# Patient Record
Sex: MALE | Race: WHITE | NOT HISPANIC OR LATINO | Employment: OTHER | URBAN - METROPOLITAN AREA
[De-identification: names, ages, dates, MRNs, and addresses within clinical notes are randomized per-mention and may not be internally consistent; named-entity substitution may affect disease eponyms.]

---

## 2019-07-05 ENCOUNTER — HOSPITAL ENCOUNTER (INPATIENT)
Facility: HOSPITAL | Age: 59
LOS: 2 days | Discharge: HOME | DRG: 189 | End: 2019-07-07
Attending: EMERGENCY MEDICINE | Admitting: HOSPITALIST
Payer: MEDICAID

## 2019-07-05 DIAGNOSIS — J44.1 COPD EXACERBATION: ICD-10-CM

## 2019-07-05 DIAGNOSIS — R07.9 CHEST PAIN: ICD-10-CM

## 2019-07-05 DIAGNOSIS — R56.9 SEIZURE: ICD-10-CM

## 2019-07-05 DIAGNOSIS — E87.29 RESPIRATORY ACIDOSIS: ICD-10-CM

## 2019-07-05 DIAGNOSIS — R06.02 SOB (SHORTNESS OF BREATH): ICD-10-CM

## 2019-07-05 DIAGNOSIS — J06.9 ACUTE RESPIRATORY DISEASE: Primary | ICD-10-CM

## 2019-07-05 PROBLEM — J96.02 ACUTE HYPERCAPNIC RESPIRATORY FAILURE: Status: ACTIVE | Noted: 2019-07-05

## 2019-07-05 PROBLEM — J44.9 COPD (CHRONIC OBSTRUCTIVE PULMONARY DISEASE): Status: ACTIVE | Noted: 2019-07-05

## 2019-07-05 PROBLEM — Z72.0 TOBACCO ABUSE: Status: ACTIVE | Noted: 2019-07-05

## 2019-07-05 PROBLEM — J45.909 ASTHMA: Status: ACTIVE | Noted: 2019-07-05

## 2019-07-05 PROBLEM — G40.909 SEIZURE DISORDER: Status: ACTIVE | Noted: 2019-07-05

## 2019-07-05 LAB
ALBUMIN SERPL BCP-MCNC: 4.4 G/DL (ref 3.5–5.2)
ALLENS TEST: ABNORMAL
ALP SERPL-CCNC: 107 U/L (ref 55–135)
ALT SERPL W/O P-5'-P-CCNC: 8 U/L (ref 10–44)
ANION GAP SERPL CALC-SCNC: 8 MMOL/L (ref 8–16)
AST SERPL-CCNC: 21 U/L (ref 10–40)
BASOPHILS # BLD AUTO: 0.05 K/UL (ref 0–0.2)
BASOPHILS NFR BLD: 0.4 % (ref 0–1.9)
BILIRUB SERPL-MCNC: 0.4 MG/DL (ref 0.1–1)
BNP SERPL-MCNC: 603 PG/ML (ref 0–99)
BUN SERPL-MCNC: 13 MG/DL (ref 6–20)
CALCIUM SERPL-MCNC: 9.8 MG/DL (ref 8.7–10.5)
CHLORIDE SERPL-SCNC: 104 MMOL/L (ref 95–110)
CO2 SERPL-SCNC: 33 MMOL/L (ref 23–29)
CREAT SERPL-MCNC: 0.8 MG/DL (ref 0.5–1.4)
DELSYS: ABNORMAL
DIFFERENTIAL METHOD: ABNORMAL
EOSINOPHIL # BLD AUTO: 1.2 K/UL (ref 0–0.5)
EOSINOPHIL NFR BLD: 9.2 % (ref 0–8)
EP: 5
ERYTHROCYTE [DISTWIDTH] IN BLOOD BY AUTOMATED COUNT: 14.5 % (ref 11.5–14.5)
ERYTHROCYTE [SEDIMENTATION RATE] IN BLOOD BY WESTERGREN METHOD: 16 MM/H
EST. GFR  (AFRICAN AMERICAN): >60 ML/MIN/1.73 M^2
EST. GFR  (NON AFRICAN AMERICAN): >60 ML/MIN/1.73 M^2
FIO2: 21
GLUCOSE SERPL-MCNC: 111 MG/DL (ref 70–110)
HCO3 UR-SCNC: 32.7 MMOL/L (ref 24–28)
HCT VFR BLD AUTO: 45 % (ref 40–54)
HGB BLD-MCNC: 13.8 G/DL (ref 14–18)
IP: 10
LYMPHOCYTES # BLD AUTO: 2.1 K/UL (ref 1–4.8)
LYMPHOCYTES NFR BLD: 16.6 % (ref 18–48)
MCH RBC QN AUTO: 30.1 PG (ref 27–31)
MCHC RBC AUTO-ENTMCNC: 30.7 G/DL (ref 32–36)
MCV RBC AUTO: 98 FL (ref 82–98)
MODE: ABNORMAL
MONOCYTES # BLD AUTO: 1 K/UL (ref 0.3–1)
MONOCYTES NFR BLD: 8.1 % (ref 4–15)
NEUTROPHILS # BLD AUTO: 8.4 K/UL (ref 1.8–7.7)
NEUTROPHILS NFR BLD: 66 % (ref 38–73)
PCO2 BLDA: 71.6 MMHG (ref 35–45)
PH SMN: 7.27 [PH] (ref 7.35–7.45)
PLATELET # BLD AUTO: 234 K/UL (ref 150–350)
PMV BLD AUTO: 10.6 FL (ref 9.2–12.9)
PO2 BLDA: 76 MMHG (ref 80–100)
POC BE: 4 MMOL/L
POC SATURATED O2: 92 % (ref 95–100)
POC TCO2: 35 MMOL/L (ref 23–27)
POTASSIUM SERPL-SCNC: 4.4 MMOL/L (ref 3.5–5.1)
PROT SERPL-MCNC: 8.6 G/DL (ref 6–8.4)
RBC # BLD AUTO: 4.59 M/UL (ref 4.6–6.2)
SAMPLE: ABNORMAL
SITE: ABNORMAL
SODIUM SERPL-SCNC: 145 MMOL/L (ref 136–145)
TROPONIN I SERPL DL<=0.01 NG/ML-MCNC: 0.01 NG/ML (ref 0–0.03)
TROPONIN I SERPL DL<=0.01 NG/ML-MCNC: 0.01 NG/ML (ref 0–0.03)
WBC # BLD AUTO: 12.81 K/UL (ref 3.9–12.7)

## 2019-07-05 PROCEDURE — 27000190 HC CPAP FULL FACE MASK W/VALVE

## 2019-07-05 PROCEDURE — 84484 ASSAY OF TROPONIN QUANT: CPT | Mod: 91

## 2019-07-05 PROCEDURE — 85025 COMPLETE CBC W/AUTO DIFF WBC: CPT

## 2019-07-05 PROCEDURE — 20000000 HC ICU ROOM

## 2019-07-05 PROCEDURE — 94644 CONT INHLJ TX 1ST HOUR: CPT

## 2019-07-05 PROCEDURE — 63600175 PHARM REV CODE 636 W HCPCS: Performed by: EMERGENCY MEDICINE

## 2019-07-05 PROCEDURE — 36600 WITHDRAWAL OF ARTERIAL BLOOD: CPT

## 2019-07-05 PROCEDURE — 96365 THER/PROPH/DIAG IV INF INIT: CPT

## 2019-07-05 PROCEDURE — 25000242 PHARM REV CODE 250 ALT 637 W/ HCPCS: Performed by: EMERGENCY MEDICINE

## 2019-07-05 PROCEDURE — 94660 CPAP INITIATION&MGMT: CPT

## 2019-07-05 PROCEDURE — 94640 AIRWAY INHALATION TREATMENT: CPT

## 2019-07-05 PROCEDURE — 94761 N-INVAS EAR/PLS OXIMETRY MLT: CPT

## 2019-07-05 PROCEDURE — 36415 COLL VENOUS BLD VENIPUNCTURE: CPT

## 2019-07-05 PROCEDURE — 82803 BLOOD GASES ANY COMBINATION: CPT

## 2019-07-05 PROCEDURE — 96375 TX/PRO/DX INJ NEW DRUG ADDON: CPT

## 2019-07-05 PROCEDURE — 84484 ASSAY OF TROPONIN QUANT: CPT

## 2019-07-05 PROCEDURE — 83880 ASSAY OF NATRIURETIC PEPTIDE: CPT

## 2019-07-05 PROCEDURE — 80053 COMPREHEN METABOLIC PANEL: CPT

## 2019-07-05 PROCEDURE — 27000221 HC OXYGEN, UP TO 24 HOURS

## 2019-07-05 PROCEDURE — 99900035 HC TECH TIME PER 15 MIN (STAT)

## 2019-07-05 PROCEDURE — 99291 CRITICAL CARE FIRST HOUR: CPT | Mod: 25

## 2019-07-05 RX ORDER — LEVETIRACETAM 1000 MG/1
1000 TABLET ORAL 2 TIMES DAILY
Status: ON HOLD | COMMUNITY
End: 2019-07-07 | Stop reason: SDUPTHER

## 2019-07-05 RX ORDER — IPRATROPIUM BROMIDE 0.5 MG/2.5ML
0.5 SOLUTION RESPIRATORY (INHALATION) ONCE
Status: COMPLETED | OUTPATIENT
Start: 2019-07-05 | End: 2019-07-05

## 2019-07-05 RX ORDER — MULTIVITAMIN
1 TABLET ORAL DAILY
Status: ON HOLD | COMMUNITY
End: 2019-07-07 | Stop reason: SDUPTHER

## 2019-07-05 RX ORDER — MAGNESIUM 200 MG
TABLET ORAL ONCE
Status: ON HOLD | COMMUNITY
End: 2019-07-07 | Stop reason: HOSPADM

## 2019-07-05 RX ORDER — FAMOTIDINE 20 MG/1
20 TABLET, FILM COATED ORAL 2 TIMES DAILY
Status: ON HOLD | COMMUNITY
End: 2019-10-15 | Stop reason: HOSPADM

## 2019-07-05 RX ORDER — IBUPROFEN 200 MG
1 TABLET ORAL DAILY
Status: DISCONTINUED | OUTPATIENT
Start: 2019-07-06 | End: 2019-07-07 | Stop reason: HOSPADM

## 2019-07-05 RX ORDER — ALBUTEROL SULFATE 1.25 MG/3ML
1.25 SOLUTION RESPIRATORY (INHALATION) EVERY 6 HOURS PRN
Status: ON HOLD | COMMUNITY
End: 2019-07-07 | Stop reason: SDUPTHER

## 2019-07-05 RX ORDER — SPIRONOLACTONE 25 MG/1
25 TABLET ORAL DAILY
Status: ON HOLD | COMMUNITY
End: 2019-07-07 | Stop reason: SDUPTHER

## 2019-07-05 RX ORDER — NAPROXEN SODIUM 220 MG
220 TABLET ORAL
Status: ON HOLD | COMMUNITY
End: 2019-07-07 | Stop reason: HOSPADM

## 2019-07-05 RX ORDER — SODIUM CHLORIDE 0.9 % (FLUSH) 0.9 %
10 SYRINGE (ML) INJECTION
Status: DISCONTINUED | OUTPATIENT
Start: 2019-07-05 | End: 2019-07-07 | Stop reason: HOSPADM

## 2019-07-05 RX ORDER — DIGOXIN 125 MCG
125 TABLET ORAL DAILY
Status: DISCONTINUED | OUTPATIENT
Start: 2019-07-06 | End: 2019-07-07 | Stop reason: HOSPADM

## 2019-07-05 RX ORDER — ALBUTEROL SULFATE 2.5 MG/.5ML
7.5 SOLUTION RESPIRATORY (INHALATION) ONCE
Status: COMPLETED | OUTPATIENT
Start: 2019-07-05 | End: 2019-07-05

## 2019-07-05 RX ORDER — LEVETIRACETAM 10 MG/ML
1000 INJECTION INTRAVASCULAR ONCE
Status: COMPLETED | OUTPATIENT
Start: 2019-07-05 | End: 2019-07-05

## 2019-07-05 RX ORDER — BENZONATATE 100 MG/1
200 CAPSULE ORAL 3 TIMES DAILY
Status: DISCONTINUED | OUTPATIENT
Start: 2019-07-06 | End: 2019-07-07 | Stop reason: HOSPADM

## 2019-07-05 RX ORDER — IPRATROPIUM BROMIDE AND ALBUTEROL SULFATE 2.5; .5 MG/3ML; MG/3ML
3 SOLUTION RESPIRATORY (INHALATION) EVERY 4 HOURS
Status: DISCONTINUED | OUTPATIENT
Start: 2019-07-05 | End: 2019-07-06

## 2019-07-05 RX ORDER — DIGOXIN 125 MCG
125 TABLET ORAL DAILY
Status: ON HOLD | COMMUNITY
End: 2019-07-07 | Stop reason: SDUPTHER

## 2019-07-05 RX ORDER — METHYLPREDNISOLONE SOD SUCC 125 MG
125 VIAL (EA) INJECTION EVERY 8 HOURS
Status: DISCONTINUED | OUTPATIENT
Start: 2019-07-06 | End: 2019-07-06

## 2019-07-05 RX ORDER — MORPHINE SULFATE 10 MG/ML
4 INJECTION INTRAMUSCULAR; INTRAVENOUS; SUBCUTANEOUS
Status: COMPLETED | OUTPATIENT
Start: 2019-07-05 | End: 2019-07-05

## 2019-07-05 RX ORDER — FAMOTIDINE 20 MG/1
20 TABLET, FILM COATED ORAL 2 TIMES DAILY
Status: DISCONTINUED | OUTPATIENT
Start: 2019-07-06 | End: 2019-07-07 | Stop reason: HOSPADM

## 2019-07-05 RX ORDER — LEVETIRACETAM 500 MG/1
1000 TABLET ORAL 2 TIMES DAILY
Status: DISCONTINUED | OUTPATIENT
Start: 2019-07-06 | End: 2019-07-07 | Stop reason: HOSPADM

## 2019-07-05 RX ORDER — METHYLPREDNISOLONE SOD SUCC 125 MG
125 VIAL (EA) INJECTION
Status: COMPLETED | OUTPATIENT
Start: 2019-07-05 | End: 2019-07-05

## 2019-07-05 RX ORDER — SPIRONOLACTONE 25 MG/1
25 TABLET ORAL DAILY
Status: DISCONTINUED | OUTPATIENT
Start: 2019-07-06 | End: 2019-07-07 | Stop reason: HOSPADM

## 2019-07-05 RX ORDER — GUAIFENESIN/DEXTROMETHORPHAN 100-10MG/5
10 SYRUP ORAL EVERY 6 HOURS
Status: DISCONTINUED | OUTPATIENT
Start: 2019-07-06 | End: 2019-07-07 | Stop reason: HOSPADM

## 2019-07-05 RX ADMIN — IPRATROPIUM BROMIDE 0.5 MG: 0.5 SOLUTION RESPIRATORY (INHALATION) at 04:07

## 2019-07-05 RX ADMIN — LEVETIRACETAM INJECTION 1000 MG: 10 INJECTION INTRAVENOUS at 04:07

## 2019-07-05 RX ADMIN — ALBUTEROL SULFATE 7.5 MG: 2.5 SOLUTION RESPIRATORY (INHALATION) at 04:07

## 2019-07-05 RX ADMIN — METHYLPREDNISOLONE SODIUM SUCCINATE 125 MG: 125 INJECTION, POWDER, FOR SOLUTION INTRAMUSCULAR; INTRAVENOUS at 04:07

## 2019-07-05 RX ADMIN — IPRATROPIUM BROMIDE AND ALBUTEROL SULFATE 3 ML: .5; 3 SOLUTION RESPIRATORY (INHALATION) at 08:07

## 2019-07-05 RX ADMIN — MORPHINE SULFATE 4 MG: 10 INJECTION INTRAVENOUS at 05:07

## 2019-07-05 RX ADMIN — IPRATROPIUM BROMIDE AND ALBUTEROL SULFATE 3 ML: .5; 3 SOLUTION RESPIRATORY (INHALATION) at 11:07

## 2019-07-05 NOTE — ED PROVIDER NOTES
Encounter Date: 7/5/2019    SCRIBE #1 NOTE: ISkylar, am scribing for, and in the presence of, Dr. Albarran. Other sections scribed: HPI ROS PE.       History     Chief Complaint   Patient presents with    Shortness of Breath     per EMS pt reported trouble breathing, and was tachypnic. pt was given albuterol tx per EMS     This is a 59 y.o. Male with a pmhx of COPD and seizures who presents to the ED for evaluation of SOB. EMS reported he had trouble breathing, and was tachypnic. Pt was given 5 mg of albuterol tx by EMS. Patient reports taking Keppra earlier today. He reports of cp only when he coughs. He reports that he smokes daily but denies a fever or cough. No prior tx, no alleviating factors. Patient has an allergy to Keflex.         Review of patient's allergies indicates:   Allergen Reactions    Keflex [cephalexin]      Past Medical History:   Diagnosis Date    COPD (chronic obstructive pulmonary disease)      History reviewed. No pertinent surgical history.  History reviewed. No pertinent family history.  Social History     Tobacco Use    Smoking status: Current Every Day Smoker     Packs/day: 1.00   Substance Use Topics    Alcohol use: Yes     Comment: some day drinker    Drug use: Not on file     Review of Systems   Constitutional: Negative for fever.   HENT: Negative for sore throat.    Respiratory: Positive for cough and shortness of breath.    Cardiovascular: Positive for chest pain.   Gastrointestinal: Negative for nausea.   Genitourinary: Negative for dysuria.   Musculoskeletal: Negative for back pain.   Skin: Negative for rash.   Neurological: Negative for weakness.   Hematological: Does not bruise/bleed easily.       Physical Exam     Initial Vitals   BP Pulse Resp Temp SpO2   07/05/19 1617 07/05/19 1610 07/05/19 1610 -- 07/05/19 1610   (!) 146/82 (!) 120 (!) 36  100 %      MAP       --                Physical Exam    Nursing note and vitals reviewed.  Constitutional: He  appears well-developed and well-nourished. He is not diaphoretic. No distress.   HENT:   Head: Normocephalic and atraumatic.   Eyes: Conjunctivae and EOM are normal. Pupils are equal, round, and reactive to light. No scleral icterus.   Neck: Normal range of motion. Neck supple.   Cardiovascular: Regular rhythm, normal heart sounds and intact distal pulses. Tachycardia present.  Exam reveals no gallop and no friction rub.    No murmur heard.  Pulmonary/Chest: No stridor. Tachypnea noted. He is in respiratory distress. He has wheezes (Bilateral wheezing). He has no rhonchi. He has no rales.   Abdominal: Soft. Bowel sounds are normal. He exhibits no distension. There is no tenderness.   Musculoskeletal: Normal range of motion. He exhibits no edema or tenderness.   Neurological: He is alert and oriented to person, place, and time. He has normal strength. No cranial nerve deficit.   Skin: Skin is warm and dry. No rash noted.   Psychiatric: He has a normal mood and affect. His behavior is normal.         ED Course   Procedures  Labs Reviewed   CBC W/ AUTO DIFFERENTIAL - Abnormal; Notable for the following components:       Result Value    WBC 12.81 (*)     RBC 4.59 (*)     Hemoglobin 13.8 (*)     Mean Corpuscular Hemoglobin Conc 30.7 (*)     Gran # (ANC) 8.4 (*)     Eos # 1.2 (*)     Lymph% 16.6 (*)     Eosinophil% 9.2 (*)     All other components within normal limits   COMPREHENSIVE METABOLIC PANEL - Abnormal; Notable for the following components:    CO2 33 (*)     Glucose 111 (*)     Total Protein 8.6 (*)     ALT 8 (*)     All other components within normal limits   B-TYPE NATRIURETIC PEPTIDE - Abnormal; Notable for the following components:     (*)     All other components within normal limits   ISTAT PROCEDURE - Abnormal; Notable for the following components:    POC PH 7.267 (*)     POC PCO2 71.6 (*)     POC PO2 76 (*)     POC HCO3 32.7 (*)     POC SATURATED O2 92 (*)     POC TCO2 35 (*)     All other  components within normal limits   TROPONIN I     EKG Readings: (Independently Interpreted)   EKG reveals Sinus tachycardia with a rate of 127. No STEMI.        Imaging Results          X-Ray Chest AP Portable (Final result)  Result time 07/05/19 17:17:29    Final result by Gabriel Lopez MD (07/05/19 17:17:29)                 Impression:      Moderate cardiomegaly without failure or pneumonia.      Electronically signed by: Gabriel Lopez  Date:    07/05/2019  Time:    17:17             Narrative:    EXAMINATION:  XR CHEST AP PORTABLE    CLINICAL HISTORY:  Chest Pain;    TECHNIQUE:  Single frontal view of the chest was performed.    COMPARISON:  None    FINDINGS:  Single AP portable view at 24:34    The heart is moderately enlarged.  The hilar shadows and trachea appear normal.  No pneumothorax or pleural effusion or interstitial edema or consolidation or nodule.  Semi lordotic positioning.  There are overlying leads.  No abdominal free air or fracture found.                                 Medical Decision Making:   History:   I obtained history from: EMS provider and another health care provider.  Old Medical Records: I decided to obtain old medical records.  Independently Interpreted Test(s):   I have ordered and independently interpreted EKG Reading(s) - see prior notes  Clinical Tests:   Lab Tests: Ordered and Reviewed  Radiological Study: Ordered and Reviewed  ED Management:  Update:  ABG reveals CO2 retention; this is likely an acute COPD exacerbation.  Patient has improvement of symptoms. Patient is comfortable and in no distress.  Patient 100% on Bipap.  Patient will be admitted to ICU. Expect patient's blood gases to improve.      Discuss case with Dr. Barone.  Will admit patient to ICU for further care continue BiPAP and treated for COPD exacerbation continue to follow ABGs  Other:   I have discussed this case with another health care provider.            Scribe Attestation:   Scribe #1: I performed  the above scribed service and the documentation accurately describes the services I performed. I attest to the accuracy of the note.    Attending Attestation:         Attending Critical Care:   Critical Care Times:   ==============================================================  · Total Critical Care Time - exclusive of procedural time: 31 minutes.  ==============================================================  Critical care was necessary to treat or prevent imminent or life-threatening deterioration of the following conditions: COPD exacerbation.   The following critical care procedures were done by me (see procedure notes): airway management.   Critical care was time spent personally by me on the following activities: obtaining history from patient or relative, examination of patient, review of x-rays / CT sent with the patient, review of old charts, ordering lab, x-rays, and/or EKG, development of treatment plan with patient or relative, ordering and performing treatments and interventions, evaluation of patient's response to treatment, discussion with consultants and re-evaluation of patient's conition.   Critical Care Condition: life-threatening     Physician Attestation for Scribe:  Physician Attestation Statement for Scribe #1: I, Dr. Albarran, reviewed documentation, as scribed by Skylar Johnson in my presence, and it is both accurate and complete.                    Clinical Impression:       ICD-10-CM ICD-9-CM   1. Acute respiratory disease J06.9 465.9   2. Chest pain R07.9 786.50   3. COPD exacerbation J44.1 491.21   4. Respiratory acidosis E87.2 276.2   5. Seizure R56.9 780.39                                Tacos Albarran MD  07/05/19 7204

## 2019-07-05 NOTE — ED TRIAGE NOTES
Pt arrived via [facility] from home. CC of SOB. EMS stated that they originally received a call for a seizure from pt neighbor. Per EMS on their arrival pt was alert on couch with no signs of seizure, but was having trouble breathing, Room air sat per EMS was 69% on room air, was given albuterol treatment per EMS. Pt arrived on breathing treatment and was refusing NRB mask with EMS. Pt denies N/V/F/D. NAD at this time.

## 2019-07-06 LAB — TROPONIN I SERPL DL<=0.01 NG/ML-MCNC: <0.006 NG/ML (ref 0–0.03)

## 2019-07-06 PROCEDURE — 11000001 HC ACUTE MED/SURG PRIVATE ROOM

## 2019-07-06 PROCEDURE — 25000242 PHARM REV CODE 250 ALT 637 W/ HCPCS: Performed by: HOSPITALIST

## 2019-07-06 PROCEDURE — 94640 AIRWAY INHALATION TREATMENT: CPT

## 2019-07-06 PROCEDURE — 27000221 HC OXYGEN, UP TO 24 HOURS

## 2019-07-06 PROCEDURE — S4991 NICOTINE PATCH NONLEGEND: HCPCS | Performed by: HOSPITALIST

## 2019-07-06 PROCEDURE — 25000003 PHARM REV CODE 250: Performed by: HOSPITALIST

## 2019-07-06 PROCEDURE — 36415 COLL VENOUS BLD VENIPUNCTURE: CPT

## 2019-07-06 PROCEDURE — 63600175 PHARM REV CODE 636 W HCPCS: Performed by: HOSPITALIST

## 2019-07-06 PROCEDURE — 84484 ASSAY OF TROPONIN QUANT: CPT

## 2019-07-06 PROCEDURE — 94660 CPAP INITIATION&MGMT: CPT

## 2019-07-06 PROCEDURE — 94761 N-INVAS EAR/PLS OXIMETRY MLT: CPT

## 2019-07-06 PROCEDURE — 99900035 HC TECH TIME PER 15 MIN (STAT)

## 2019-07-06 PROCEDURE — 94799 UNLISTED PULMONARY SVC/PX: CPT

## 2019-07-06 RX ORDER — METHYLPREDNISOLONE SOD SUCC 125 MG
60 VIAL (EA) INJECTION EVERY 8 HOURS
Status: DISCONTINUED | OUTPATIENT
Start: 2019-07-06 | End: 2019-07-07

## 2019-07-06 RX ORDER — HYDRALAZINE HYDROCHLORIDE 20 MG/ML
10 INJECTION INTRAMUSCULAR; INTRAVENOUS EVERY 6 HOURS PRN
Status: DISCONTINUED | OUTPATIENT
Start: 2019-07-06 | End: 2019-07-07 | Stop reason: HOSPADM

## 2019-07-06 RX ORDER — METOPROLOL TARTRATE 1 MG/ML
5 INJECTION, SOLUTION INTRAVENOUS EVERY 5 MIN PRN
Status: DISCONTINUED | OUTPATIENT
Start: 2019-07-06 | End: 2019-07-07 | Stop reason: HOSPADM

## 2019-07-06 RX ORDER — BUDESONIDE 0.5 MG/2ML
0.5 INHALANT ORAL EVERY 12 HOURS
Status: DISCONTINUED | OUTPATIENT
Start: 2019-07-06 | End: 2019-07-07 | Stop reason: HOSPADM

## 2019-07-06 RX ORDER — IPRATROPIUM BROMIDE AND ALBUTEROL SULFATE 2.5; .5 MG/3ML; MG/3ML
3 SOLUTION RESPIRATORY (INHALATION)
Status: DISCONTINUED | OUTPATIENT
Start: 2019-07-06 | End: 2019-07-07 | Stop reason: HOSPADM

## 2019-07-06 RX ADMIN — GUAIFENESIN AND DEXTROMETHORPHAN 10 ML: 100; 10 SYRUP ORAL at 06:07

## 2019-07-06 RX ADMIN — BENZONATATE 200 MG: 100 CAPSULE ORAL at 12:07

## 2019-07-06 RX ADMIN — IPRATROPIUM BROMIDE AND ALBUTEROL SULFATE 3 ML: .5; 3 SOLUTION RESPIRATORY (INHALATION) at 08:07

## 2019-07-06 RX ADMIN — LEVETIRACETAM 1000 MG: 500 TABLET ORAL at 09:07

## 2019-07-06 RX ADMIN — BENZONATATE 200 MG: 100 CAPSULE ORAL at 08:07

## 2019-07-06 RX ADMIN — FAMOTIDINE 20 MG: 20 TABLET ORAL at 08:07

## 2019-07-06 RX ADMIN — METHYLPREDNISOLONE SODIUM SUCCINATE 125 MG: 125 INJECTION, POWDER, FOR SOLUTION INTRAMUSCULAR; INTRAVENOUS at 02:07

## 2019-07-06 RX ADMIN — LEVETIRACETAM 1000 MG: 500 TABLET ORAL at 08:07

## 2019-07-06 RX ADMIN — FAMOTIDINE 20 MG: 20 TABLET ORAL at 12:07

## 2019-07-06 RX ADMIN — APIXABAN 5 MG: 5 TABLET, FILM COATED ORAL at 12:07

## 2019-07-06 RX ADMIN — APIXABAN 5 MG: 5 TABLET, FILM COATED ORAL at 09:07

## 2019-07-06 RX ADMIN — NICOTINE 1 PATCH: 21 PATCH, EXTENDED RELEASE TRANSDERMAL at 08:07

## 2019-07-06 RX ADMIN — GUAIFENESIN AND DEXTROMETHORPHAN 10 ML: 100; 10 SYRUP ORAL at 12:07

## 2019-07-06 RX ADMIN — DIGOXIN 125 MCG: 125 TABLET ORAL at 08:07

## 2019-07-06 RX ADMIN — APIXABAN 5 MG: 5 TABLET, FILM COATED ORAL at 08:07

## 2019-07-06 RX ADMIN — BUDESONIDE 0.5 MG: 0.5 INHALANT RESPIRATORY (INHALATION) at 08:07

## 2019-07-06 RX ADMIN — LEVETIRACETAM 1000 MG: 500 TABLET ORAL at 12:07

## 2019-07-06 RX ADMIN — IPRATROPIUM BROMIDE AND ALBUTEROL SULFATE 3 ML: .5; 3 SOLUTION RESPIRATORY (INHALATION) at 04:07

## 2019-07-06 RX ADMIN — SPIRONOLACTONE 25 MG: 25 TABLET ORAL at 08:07

## 2019-07-06 RX ADMIN — METHYLPREDNISOLONE SODIUM SUCCINATE 125 MG: 125 INJECTION, POWDER, FOR SOLUTION INTRAMUSCULAR; INTRAVENOUS at 05:07

## 2019-07-06 RX ADMIN — BENZONATATE 200 MG: 100 CAPSULE ORAL at 09:07

## 2019-07-06 RX ADMIN — GUAIFENESIN AND DEXTROMETHORPHAN 10 ML: 100; 10 SYRUP ORAL at 11:07

## 2019-07-06 RX ADMIN — GUAIFENESIN AND DEXTROMETHORPHAN 10 ML: 100; 10 SYRUP ORAL at 05:07

## 2019-07-06 RX ADMIN — BUDESONIDE 0.5 MG: 0.5 INHALANT RESPIRATORY (INHALATION) at 09:07

## 2019-07-06 RX ADMIN — METHYLPREDNISOLONE SODIUM SUCCINATE 60 MG: 125 INJECTION, POWDER, FOR SOLUTION INTRAMUSCULAR; INTRAVENOUS at 09:07

## 2019-07-06 RX ADMIN — FAMOTIDINE 20 MG: 20 TABLET ORAL at 09:07

## 2019-07-06 RX ADMIN — BENZONATATE 200 MG: 100 CAPSULE ORAL at 02:07

## 2019-07-06 NOTE — PLAN OF CARE
Problem: Respiratory Compromise COPD (Chronic Obstructive Pulmonary Disease)  Goal: Effective Oxygenation and Ventilation  Outcome: Ongoing (interventions implemented as appropriate)  Pt received on BIPAP 10/5 25%. Patient is complaining of the BIPAP being annoying and the mask hurting his nose. His respirations are even and unlabored. Sats are 100%. Will attempt to take the patient off the BIPAP.

## 2019-07-06 NOTE — CARE UPDATE
[facility]  ICU Multidisciplinary Bedside Rounds   SUMMARY     Date: 7/5/2019    Prehospitalization: Home  Admit Date / LOS : 7/5/2019/ 0 days    Diagnosis: <principal problem not specified>    Consults:        Active: N/A       Needed: N/A     Code Status: Full Code  Advanced Directive: <no information>    LDA: BIPAP and PIV       Central Lines/Site/Justification:Patient Does Not Have Central Line       Urinary Cath/Order/Justification:Patient Does Not Have Urinary Catheter    Infusions: None       GOALS: Volume/ Hemodynamic: N/A                     RASS: 0  alert and calm    CAM ICU: Negative  Pain Management: none       Pain Controlled: yes     Rhythm: NSR    Respiratory Device: Bipap               MOVE Screen: PASS    VTE Prophylaxis: Pharm  Mobility: Bedrest  Stress Ulcer Prophylaxis: yes    Dietary: NPO  Tolerance: not applicable  /  Advancement: n/a    Isolation: No active isolations    Restraints: No    Significant Dates:  Post Op Date: N/A  Rescue Date: N/A  Imaging/ Diagnostics: N/A    Noteworthy Labs: ,     Needs from Care Team: None     ICU LOS 1h  Level of Care: OK to Transfer

## 2019-07-06 NOTE — HPI
Pierre Pierson is a 60 yo man with A fib on Eliquis, COPD, tobacco abuse, and seizures who presented with acute on chronic shortness of breath.  Complains of chest pain with cough.  Denies syncope, edema, cyanosis, or palpitations.  Positive for wheezing and shortness of breath at rest.  Denies hemoptysis, stridor, or night sweats.  He is a tobacco smoker.  Denies fever chills.  History of COPD exacerbation in the past.  Given supplemental oxygen by EMS with little relief.    In the emergency department routine laboratory studies and chest x-ray obtained.  ABG revealed evidence of acute hypercapnic respiratory failure likely secondary to COPD exacerbation.  He was given supplemental oxygen, duo nebs, and started on BiPAP.

## 2019-07-06 NOTE — PLAN OF CARE
07/06/19 1229   Discharge Assessment   Assessment Type Discharge Planning Assessment   Confirmed/corrected address and phone number on facesheet? Yes   Assessment information obtained from? Patient   Communicated expected length of stay with patient/caregiver no   Prior to hospitilization cognitive status: Alert/Oriented   Prior to hospitalization functional status: Independent   Current cognitive status: Alert/Oriented   Current Functional Status: Independent   Facility Arrived From: Home   Lives With sibling(s)   Able to Return to Prior Arrangements yes   Is patient able to care for self after discharge? Yes  (With sibling's assist at home)   Who are your caregiver(s) and their phone number(s)? Ailyn: [phone]   Patient's perception of discharge disposition home or selfcare   Readmission Within the Last 30 Days no previous admission in last 30 days   Patient currently being followed by outpatient case management? No   Patient currently receives any other outside agency services? No   Equipment Currently Used at Home none   Do you have any problems affording any of your prescribed medications? TBD   Is the patient taking medications as prescribed? yes   Does the patient have transportation home? Yes   Transportation Anticipated other (see comments)  (taxi)   Does the patient receive services at the [facility]? No   Discharge Plan A Home with family   Discharge Plan B Other  (TBD)   DME Needed Upon Discharge  other (see comments)  (TBD)   Patient/Family in Agreement with Plan yes   SW Role explained to patient; two patient identifiers recognized; SW contact information placed on Communication board. Discussed patient managing health care at home; determined who would be helping patient at home with recovery: Ailyn will help with recovery at home    PCP: [employer] [facility]     Extended Emergency Contact Information  Primary Emergency Contact: DAYSI SILVERMAN  Mobile Phone:  [phone]  Relation: Brother     Pharmacy: [facility]    Payor: /

## 2019-07-06 NOTE — PLAN OF CARE
Problem: Adult Inpatient Plan of Care  Goal: Plan of Care Review  Outcome: Ongoing (interventions implemented as appropriate)  Pt in the ICU on BiPAP 10/5, 25% FiO2, tolerating well. Bradycardic in the 50's when sleeping.  AAO x4. Troponin negative. Plan of care reviewed with patient. No falls, injuries or skin breakdown this shift. Precautions maintained for all.

## 2019-07-06 NOTE — H&P
[facility] Hospital Medicine  History & Physical    Patient Name: Pierre Pierson  MRN: [id number]  Admission Date: 07/06/2019  Attending Physician: Chang Adams MD, MPH      PCP:     [facility]    CC:     Chief Complaint   Patient presents with    Shortness of Breath     per EMS pt reported trouble breathing, and was tachypnic. pt was given albuterol tx per EMS       HISTORY OF PRESENT ILLNESS:     Pierre Pierson is a 59 y.o. male that (in part)  has a past medical history of Asthma, COPD (chronic obstructive pulmonary disease), and Seizures.  has no past surgical history on file. Presents to [facility] Emergency Department complaining of acute on chronic shortness of breath.  Complains of chest pain with cough.  Denies syncope, edema, cyanosis, or palpitations.  Positive for wheezing and shortness of breath at rest.  Denies hemoptysis, stridor, or night sweats.  He is a tobacco smoker.  Denies fever chills.  History of COPD exacerbation in the past.  Given supplemental oxygen by EMS with little relief.    In the emergency department routine laboratory studies and chest x-ray obtained.  ABG revealed evidence of acute hypercapnic respiratory failure likely secondary to COPD exacerbation.  He was given supplemental oxygen, duo nebs, and started on BiPAP.    Hospital medicine has been asked to admit for further evaluation and treatment.       REVIEW OF SYSTEMS:     -- Constitutional: No fever or chills.  -- Eyes: No visual changes, diplopia, pain, tearing, blind spots, or discharge.   -- Ears, nose, mouth, throat, and face: No congestion, sore throat, epistaxis, d/c, bleeding gums, neck stiffness masses, or dental issues.  -- Respiratory: No cough, shortness of breath, hemoptysis, stridor, wheezing, or night sweats.  -- Cardiovascular: No chest pain, CLIFFORD, syncope, PND, edema, cyanosis, or palpitations.   -- Gastrointestinal: No vomiting, abdominal  pain, hematemesis, melena, dyspepsia, or change in bowel habits.  -- Genitourinary: No hematuria, dysuria, frequency, urgency, nocturia, polyuria, stones, or incontinence.  -- Integument/breast: No rash, pruritis, pigmentation changes, dryness, or changes in hair  -- Hematologic/lymphatic: No easy bruising or lymphadenopathy.   -- Musculoskeletal: No acute arthralgias, acute myalgias, joint swelling, acute limitations of ROM, or acute muscular weakness.  -- Neurological: No seizures, headaches, incoordination, paraesthesias, ataxia, vertigo, or tremors.  -- Behavioral/Psych: No auditory or visual hallucinations, depression, or suicidal/homicidal ideations.  -- Endocrine: No heat or cold intolerance, polydipsia, or unintentional weight gain / loss.  -- Allergy/Immunologic: No recurrent infections or adverse reaction to food, insects, or difficulty breathing.      PAST MEDICAL / SURGICAL HISTORY:     Past Medical History:   Diagnosis Date    Asthma     COPD (chronic obstructive pulmonary disease)     Seizures      History reviewed. No pertinent surgical history.      FAMILY HISTORY:     History reviewed. No pertinent family history.      SOCIAL HISTORY:     Social History     Socioeconomic History    Marital status: Single     Spouse name: Not on file    Number of children: Not on file    Years of education: Not on file    Highest education level: Not on file   Occupational History    Not on file   Social Needs    Financial resource strain: Not on file    Food insecurity:     Worry: Not on file     Inability: Not on file    Transportation needs:     Medical: Not on file     Non-medical: Not on file   Tobacco Use    Smoking status: Current Every Day Smoker     Packs/day: 1.00   Substance and Sexual Activity    Alcohol use: Yes     Comment: some day drinker    Drug use: Not on file    Sexual activity: Not on file   Lifestyle    Physical activity:     Days per week: Not on file     Minutes per session:  Not on file    Stress: Not on file   Relationships    Social connections:     Talks on phone: Not on file     Gets together: Not on file     Attends Synagogue service: Not on file     Active member of club or organization: Not on file     Attends meetings of clubs or organizations: Not on file     Relationship status: Not on file   Other Topics Concern    Not on file   Social History Narrative    Not on file         ALLERGIES:       Review of patient's allergies indicates:   Allergen Reactions    Keflex [cephalexin]            HOME MEDICATIONS:     Prior to Admission medications    Medication Sig Start Date End Date Taking? Authorizing Provider   albuterol (ACCUNEB) 1.25 mg/3 mL Nebu Take 1.25 mg by nebulization every 6 (six) hours as needed. Rescue   Yes Historical Provider, MD   apixaban (ELIQUIS) 5 mg Tab Take 5 mg by mouth 2 (two) times daily.   Yes Historical Provider, MD   digoxin (LANOXIN) 125 mcg tablet Take 125 mcg by mouth once daily.   Yes Historical Provider, MD   famotidine (PEPCID) 20 MG tablet Take 20 mg by mouth 2 (two) times daily.   Yes Historical Provider, MD   levETIRAcetam (KEPPRA) 1000 MG tablet Take 1,000 mg by mouth 2 (two) times daily.   Yes Historical Provider, MD   magnesium 200 mg Tab Take by mouth once.   Yes Historical Provider, MD   multivitamin (THERAGRAN) per tablet Take 1 tablet by mouth once daily.   Yes Historical Provider, MD   naproxen sodium (ANAPROX) 220 MG tablet Take 220 mg by mouth every 12 (twelve) hours.   Yes Historical Provider, MD   spironolactone (ALDACTONE) 25 MG tablet Take 25 mg by mouth once daily.   Yes Historical Provider, MD   umeclidinium-vilanterol (ANORO ELLIPTA) 62.5-25 mcg/actuation DsDv Inhale into the lungs. Controller   Yes Historical Provider, MD          HOSPITAL MEDICATIONS:     Scheduled Meds:    albuterol-ipratropium  3 mL Nebulization Q4H    apixaban  5 mg Oral BID    benzonatate  200 mg Oral TID    budesonide  0.5 mg Nebulization Q12H     dextromethorphan-guaifenesin  mg/5 ml  10 mL Oral Q6H    digoxin  125 mcg Oral Daily    famotidine  20 mg Oral BID    levETIRAcetam  1,000 mg Oral BID    methylPREDNISolone sodium succinate  125 mg Intravenous Q8H    nicotine  1 patch Transdermal Daily    spironolactone  25 mg Oral Daily     Continuous Infusions:   PRN Meds: hydrALAZINE, metoprolol, sodium chloride 0.9%      PHYSICAL EXAM:     Wt Readings from Last 1 Encounters:   07/05/19 1957 57.1 kg (125 lb 14.1 oz)   07/05/19 1610 63 kg (139 lb)     Body mass index is 17.56 kg/m².  Vitals:    07/05/19 2315 07/05/19 2322 07/05/19 2330 07/05/19 2345   BP: 120/69  116/68 118/65   BP Location:       Patient Position:       Pulse: (!) 56 73 61 (!) 57   Resp: 18 18 20 15   Temp: 97.7 °F (36.5 °C)      TempSrc: Axillary      SpO2: 97% 99% 100% 100%   Weight:       Height:              -- General appearance: well developed. appears stated age   -- Head: normocephalic, atraumatic   -- Eyes: conjunctivae clear. Extraocular muscles intact  -- Nose: Nares normal. Septum midline.   -- Mouth/Throat: On Bipap.  lips, mucosa, and tongue normal. no throat erythema.   -- Neck: supple, symmetrical, trachea midline, no JVD and thyroid not grossly enlarged, appears symmetric  -- Lungs: wheezing bilaterally. Decreased breath sounds.  Poor air excursion. normal respiratory effort. No use of accessory muscles.   -- Chest wall: no tenderness. equal bilateral chest rise   -- Heart: regular rate and regular rhythm. S1, S2 normal.  no click, rub or gallop   -- Abdomen: soft, non-tender, non-distended, non-tympanic; bowel sounds normal; no masses  -- Extremities: no cyanosis, clubbing or edema.   -- Pulses: 2+ and symmetric   -- Skin: color normal, texture normal, turgor normal. No rashes or lesions.   -- Neurologic: Normal strength and tone. No focal numbness or weakness. CNII-XII intact. Robbie coma scale: eyes open spontaneously-4, oriented & converses-5, obeys  commands-6.      LABORATORY STUDIES:     Recent Results (from the past 36 hour(s))   CBC auto differential    Collection Time: 07/05/19  4:05 PM   Result Value Ref Range    WBC 12.81 (H) 3.90 - 12.70 K/uL    RBC 4.59 (L) 4.60 - 6.20 M/uL    Hemoglobin 13.8 (L) 14.0 - 18.0 g/dL    Hematocrit 45.0 40.0 - 54.0 %    Mean Corpuscular Volume 98 82 - 98 fL    Mean Corpuscular Hemoglobin 30.1 27.0 - 31.0 pg    Mean Corpuscular Hemoglobin Conc 30.7 (L) 32.0 - 36.0 g/dL    RDW 14.5 11.5 - 14.5 %    Platelets 234 150 - 350 K/uL    MPV 10.6 9.2 - 12.9 fL    Gran # (ANC) 8.4 (H) 1.8 - 7.7 K/uL    Lymph # 2.1 1.0 - 4.8 K/uL    Mono # 1.0 0.3 - 1.0 K/uL    Eos # 1.2 (H) 0.0 - 0.5 K/uL    Baso # 0.05 0.00 - 0.20 K/uL    Gran% 66.0 38.0 - 73.0 %    Lymph% 16.6 (L) 18.0 - 48.0 %    Mono% 8.1 4.0 - 15.0 %    Eosinophil% 9.2 (H) 0.0 - 8.0 %    Basophil% 0.4 0.0 - 1.9 %    Differential Method Automated    Comprehensive metabolic panel    Collection Time: 07/05/19  4:05 PM   Result Value Ref Range    Sodium 145 136 - 145 mmol/L    Potassium 4.4 3.5 - 5.1 mmol/L    Chloride 104 95 - 110 mmol/L    CO2 33 (H) 23 - 29 mmol/L    Glucose 111 (H) 70 - 110 mg/dL    BUN, Bld 13 6 - 20 mg/dL    Creatinine 0.8 0.5 - 1.4 mg/dL    Calcium 9.8 8.7 - 10.5 mg/dL    Total Protein 8.6 (H) 6.0 - 8.4 g/dL    Albumin 4.4 3.5 - 5.2 g/dL    Total Bilirubin 0.4 0.1 - 1.0 mg/dL    Alkaline Phosphatase 107 55 - 135 U/L    AST 21 10 - 40 U/L    ALT 8 (L) 10 - 44 U/L    Anion Gap 8 8 - 16 mmol/L    eGFR if African American >60 >60 mL/min/1.73 m^2    eGFR if non African American >60 >60 mL/min/1.73 m^2   Troponin I #1    Collection Time: 07/05/19  4:05 PM   Result Value Ref Range    Troponin I 0.011 0.000 - 0.026 ng/mL   B-Type natriuretic peptide (BNP)    Collection Time: 07/05/19  4:05 PM   Result Value Ref Range     (H) 0 - 99 pg/mL   ISTAT PROCEDURE    Collection Time: 07/05/19  4:43 PM   Result Value Ref Range    POC PH 7.267 (LL) 7.35 - 7.45    POC PCO2  71.6 (HH) 35 - 45 mmHg    POC PO2 76 (L) 80 - 100 mmHg    POC HCO3 32.7 (H) 24 - 28 mmol/L    POC BE 4 -2 to 2 mmol/L    POC SATURATED O2 92 (L) 95 - 100 %    POC TCO2 35 (H) 23 - 27 mmol/L    Rate 16     Sample ARTERIAL     Site RR     Allens Test Pass     DelSys CPAP/BiPAP     Mode BiPAP     FiO2 21     IP 10     EP 5    Troponin I    Collection Time: 07/05/19  9:44 PM   Result Value Ref Range    Troponin I 0.013 0.000 - 0.026 ng/mL       No results found for: INR, PROTIME  No results found for: HGBA1C  No results for input(s): POCTGLUCOSE in the last 72 hours.          IMAGING:     Imaging Results          X-Ray Chest AP Portable (Final result)  Result time 07/05/19 17:17:29    Final result by Gabriel Lopez MD (07/05/19 17:17:29)                 Impression:      Moderate cardiomegaly without failure or pneumonia.      Electronically signed by: Gabriel Lopez  Date:    07/05/2019  Time:    17:17             Narrative:    EXAMINATION:  XR CHEST AP PORTABLE    CLINICAL HISTORY:  Chest Pain;    TECHNIQUE:  Single frontal view of the chest was performed.    COMPARISON:  None    FINDINGS:  Single AP portable view at 24:34    The heart is moderately enlarged.  The hilar shadows and trachea appear normal.  No pneumothorax or pleural effusion or interstitial edema or consolidation or nodule.  Semi lordotic positioning.  There are overlying leads.  No abdominal free air or fracture found.                                  CONSULTS:     None       ASSESSMENT & PLAN:     Primary Diagnosis:  Acute hypercapnic respiratory failure    Active Hospital Problems    Diagnosis  POA    *Acute hypercapnic respiratory failure [J96.02]  Yes     Priority: 1 - High    Acute exacerbation of chronic obstructive pulmonary disease (COPD) [J44.1]  Yes     Priority: 2     Asthma [J45.909]  Yes    Seizure disorder [G40.909]  Yes    Tobacco abuse [Z72.0]  Yes      Resolved Hospital Problems   No resolved problems to display.       Acute  hypercapnic respiratory failure secondary to COPD exacerbation  · History of COPD and tobacco abuse.  · Scheduled nebulizer treatments (albuterol/atrovent)  · Initiate Solumedrol 125mg IV q8, then taper as clinical course improves; convert to PO taper as outpatient  · PPI or H2 blocker concomitantly with steroids  · Titrate O2 sats between 88 to 93%.  No supplemental 02 for 02 saturation greater than 93% due to V/Q mismatch  · Breo, or similar, while inpatient  · Add budesonide/formoterol or salmeterol/fluticasone propionate - at or before discharge - (Advair 500/50mg, Spiriva 18mcg, or Daliresp 500mcg)   · Mucolytics  · Consult pulmonology for further optimization  · Tobacco cessation counseling    Tobacco abuse  · Chronic tobacco use  · Tobacco cessation counseling  · Nicotine patch offered; consider Wellbutrin or Chantix through PCP as an outpatient (will require closer monitoring)  · I discussed with the patient regarding the hazardous effects of smoking on increasing risk of heart attack and stroke, worsening lung functions, and increasing cancer risk.   Patient was urged to stop smoking now.  I also offered nicotine taper (such as nicotine patch and gum) to help ease the craving to smoke.    Seizure Disorder  · No acute issues  · Seizure restrictions are but not limited to: no driving for six months after last seizure; avoid swimming, high altitude activities, operating heavy machinery, bathing unattended, or engaging in activities in where a seizure will cause harm to self or others.  · F/u Neuro as an oupt.          VTE Risk Mitigation (From admission, onward)        Ordered     apixaban tablet 5 mg  2 times daily      07/05/19 0132     IP VTE LOW RISK PATIENT  Once      07/05/19 1958     Place sequential compression device  Until discontinued      07/05/19 1958     Place KARY hose  Until discontinued      07/05/19 1958            Adult PRN medications available   DVT prophylaxis given       DISPOSITION:      Will admit to the Hospital Medicine service for further evaluation and treatment.    Chart reviewed and updated where applicable.    High Risk Conditions:  Patient has a condition that poses threat to life and bodily function: Severe Respiratory Distress      ===============================================================    Chang Adams MD, MPH  Department of Hospital Medicine   [facility]  [phone] pg  (7pm - 6am)          This note is dictated using [employer] voice recognition software.  There are word recognition mistakes that are occasionally missed on review.

## 2019-07-06 NOTE — EICU
New admit    58 y/o M history of seizures and COPD brought in by EMS due to shortness of breath.  On presentation patient was hypoxemic and tachypneic requiring BiPap.    Camera assessment:  Patient is seen asleep, tolerating BiPap 10/5  /64  HR 58  O2 99%    Data:  WBC 12, H/H 14/45, platelets 234  Na 145, K 4.4, creatinine 0.8, CO2 33  , Trop I 0.013  ABG 7.27/72/76  CXR enlarged cardiac silhouette    · Hypercapneic and hypoxemic respiratory failure secondary to COPD on BiPap. Continue bronchodilators and steroids  · Will need 2 D echo for risk stratification

## 2019-07-06 NOTE — CARE UPDATE
Patient on 2lpm NC doing well. WOB WNL.no reports of SOB.  sats 97% patient resting comfortably. Will continue to monitor.

## 2019-07-06 NOTE — NURSING
Pt transferred to floor. No complaints of pain. Pt oriented to floor, call bell in reach, bed in lowest position. Pt in no distress. Will cont to monitor.

## 2019-07-06 NOTE — PROGRESS NOTES
Patient and brother, Yamil explained that they are in the process of transferring his medicaid insurance from [place] to [place]. He provided contact information for his previous pcp for us to acquire past medical records if needed:   MD Zeeshan   [place], [place]  [phone]    Patient was treated at the [facility] for traumatic brain injury. Currently he sees Seth Lara at the [facility]: [phone]

## 2019-07-07 VITALS
TEMPERATURE: 97 F | HEIGHT: 71 IN | BODY MASS INDEX: 17.62 KG/M2 | WEIGHT: 125.88 LBS | RESPIRATION RATE: 18 BRPM | OXYGEN SATURATION: 97 % | HEART RATE: 77 BPM | SYSTOLIC BLOOD PRESSURE: 120 MMHG | DIASTOLIC BLOOD PRESSURE: 77 MMHG

## 2019-07-07 PROBLEM — G40.909 SEIZURE DISORDER: Chronic | Status: ACTIVE | Noted: 2019-07-05

## 2019-07-07 PROBLEM — Z72.0 TOBACCO ABUSE: Chronic | Status: ACTIVE | Noted: 2019-07-05

## 2019-07-07 PROBLEM — I48.20 ATRIAL FIBRILLATION, CHRONIC: Chronic | Status: ACTIVE | Noted: 2019-07-07

## 2019-07-07 PROBLEM — J45.909 ASTHMA: Chronic | Status: ACTIVE | Noted: 2019-07-05

## 2019-07-07 LAB
AORTIC ROOT ANNULUS: 3.98 CM
AORTIC VALVE CUSP SEPERATION: 2.32 CM
ASCENDING AORTA: 3.26 CM
AV INDEX (PROSTH): 0.35
AV MEAN GRADIENT: 12 MMHG
AV PEAK GRADIENT: 24 MMHG
AV VALVE AREA: 1.63 CM2
AV VELOCITY RATIO: 0.34
BSA FOR ECHO PROCEDURE: 1.78 M2
CV ECHO LV RWT: 0.39 CM
DOP CALC AO PEAK VEL: 2.44 M/S
DOP CALC AO VTI: 40.72 CM
DOP CALC LVOT AREA: 4.7 CM2
DOP CALC LVOT DIAMETER: 2.44 CM
DOP CALC LVOT PEAK VEL: 0.84 M/S
DOP CALC LVOT STROKE VOLUME: 66.5 CM3
DOP CALCLVOT PEAK VEL VTI: 14.23 CM
E WAVE DECELERATION TIME: 194.97 MSEC
E/A RATIO: 1.13
E/E' RATIO: 16.57 M/S
ECHO LV POSTERIOR WALL: 1.09 CM (ref 0.6–1.1)
FRACTIONAL SHORTENING: 34 % (ref 28–44)
INTERVENTRICULAR SEPTUM: 1.26 CM (ref 0.6–1.1)
IVRT: 0.12 MSEC
LA MAJOR: 5.42 CM
LA MINOR: 5.79 CM
LA WIDTH: 4.29 CM
LEFT ATRIUM SIZE: 3.21 CM
LEFT ATRIUM VOLUME INDEX: 37.8 ML/M2
LEFT ATRIUM VOLUME: 65.54 CM3
LEFT INTERNAL DIMENSION IN SYSTOLE: 3.75 CM (ref 2.1–4)
LEFT VENTRICLE DIASTOLIC VOLUME INDEX: 90.51 ML/M2
LEFT VENTRICLE DIASTOLIC VOLUME: 156.78 ML
LEFT VENTRICLE MASS INDEX: 160 G/M2
LEFT VENTRICLE SYSTOLIC VOLUME INDEX: 34.7 ML/M2
LEFT VENTRICLE SYSTOLIC VOLUME: 60.05 ML
LEFT VENTRICULAR INTERNAL DIMENSION IN DIASTOLE: 5.65 CM (ref 3.5–6)
LEFT VENTRICULAR MASS: 276.52 G
LV LATERAL E/E' RATIO: 12.89 M/S
LV SEPTAL E/E' RATIO: 23.2 M/S
MV PEAK A VEL: 1.03 M/S
MV PEAK E VEL: 1.16 M/S
PISA TR MAX VEL: 1.84 M/S
POCT GLUCOSE: 111 MG/DL (ref 70–110)
PULM VEIN S/D RATIO: 1.47
PV PEAK D VEL: 0.45 M/S
PV PEAK S VEL: 0.66 M/S
PV PEAK VELOCITY: 0.83 CM/S
RA MAJOR: 5.04 CM
RA PRESSURE: 3 MMHG
RA WIDTH: 4.2 CM
RIGHT VENTRICULAR END-DIASTOLIC DIMENSION: 4.85 CM
RV TISSUE DOPPLER FREE WALL SYSTOLIC VELOCITY 1 (APICAL 4 CHAMBER VIEW): 9.77 CM/S
SINUS: 3.8 CM
STJ: 3.16 CM
TDI LATERAL: 0.09 M/S
TDI SEPTAL: 0.05 M/S
TDI: 0.07 M/S
TR MAX PG: 14 MMHG
TRICUSPID ANNULAR PLANE SYSTOLIC EXCURSION: 2.31 CM
TV REST PULMONARY ARTERY PRESSURE: 17 MMHG

## 2019-07-07 PROCEDURE — 25000242 PHARM REV CODE 250 ALT 637 W/ HCPCS: Performed by: HOSPITALIST

## 2019-07-07 PROCEDURE — 94761 N-INVAS EAR/PLS OXIMETRY MLT: CPT

## 2019-07-07 PROCEDURE — 25000003 PHARM REV CODE 250: Performed by: INTERNAL MEDICINE

## 2019-07-07 PROCEDURE — S4991 NICOTINE PATCH NONLEGEND: HCPCS | Performed by: HOSPITALIST

## 2019-07-07 PROCEDURE — 63600175 PHARM REV CODE 636 W HCPCS: Performed by: INTERNAL MEDICINE

## 2019-07-07 PROCEDURE — 63600175 PHARM REV CODE 636 W HCPCS: Performed by: HOSPITALIST

## 2019-07-07 PROCEDURE — 25000003 PHARM REV CODE 250: Performed by: HOSPITALIST

## 2019-07-07 PROCEDURE — 94640 AIRWAY INHALATION TREATMENT: CPT

## 2019-07-07 RX ORDER — SPIRONOLACTONE 25 MG/1
25 TABLET ORAL DAILY
Qty: 30 TABLET | Refills: 0 | Status: ON HOLD | OUTPATIENT
Start: 2019-07-07 | End: 2019-10-15 | Stop reason: HOSPADM

## 2019-07-07 RX ORDER — LEVETIRACETAM 1000 MG/1
1000 TABLET ORAL 2 TIMES DAILY
Qty: 60 TABLET | Refills: 0 | Status: ON HOLD | OUTPATIENT
Start: 2019-07-07 | End: 2019-08-06

## 2019-07-07 RX ORDER — MULTIVITAMIN
1 TABLET ORAL DAILY
Status: ON HOLD | COMMUNITY
Start: 2019-07-07 | End: 2023-02-01 | Stop reason: HOSPADM

## 2019-07-07 RX ORDER — PREDNISONE 20 MG/1
40 TABLET ORAL DAILY
Qty: 6 TABLET | Refills: 0 | Status: SHIPPED | OUTPATIENT
Start: 2019-07-08 | End: 2019-07-11

## 2019-07-07 RX ORDER — DIGOXIN 125 MCG
125 TABLET ORAL DAILY
Qty: 30 TABLET | Refills: 0 | Status: ON HOLD | OUTPATIENT
Start: 2019-07-07 | End: 2019-10-15 | Stop reason: HOSPADM

## 2019-07-07 RX ORDER — PREDNISONE 20 MG/1
40 TABLET ORAL DAILY
Status: DISCONTINUED | OUTPATIENT
Start: 2019-07-07 | End: 2019-07-07 | Stop reason: HOSPADM

## 2019-07-07 RX ORDER — ALBUTEROL SULFATE 1.25 MG/3ML
1.25 SOLUTION RESPIRATORY (INHALATION) EVERY 6 HOURS PRN
Qty: 1 BOX | Refills: 0 | Status: SHIPPED | OUTPATIENT
Start: 2019-07-07 | End: 2021-01-11 | Stop reason: SDUPTHER

## 2019-07-07 RX ORDER — ACETAMINOPHEN 325 MG/1
650 TABLET ORAL EVERY 6 HOURS PRN
Status: DISCONTINUED | OUTPATIENT
Start: 2019-07-07 | End: 2019-07-07 | Stop reason: HOSPADM

## 2019-07-07 RX ADMIN — IPRATROPIUM BROMIDE AND ALBUTEROL SULFATE 3 ML: .5; 3 SOLUTION RESPIRATORY (INHALATION) at 08:07

## 2019-07-07 RX ADMIN — ACETAMINOPHEN 650 MG: 325 TABLET, FILM COATED ORAL at 09:07

## 2019-07-07 RX ADMIN — BUDESONIDE 0.5 MG: 0.5 INHALANT RESPIRATORY (INHALATION) at 08:07

## 2019-07-07 RX ADMIN — FAMOTIDINE 20 MG: 20 TABLET ORAL at 08:07

## 2019-07-07 RX ADMIN — PREDNISONE 40 MG: 20 TABLET ORAL at 12:07

## 2019-07-07 RX ADMIN — APIXABAN 5 MG: 5 TABLET, FILM COATED ORAL at 08:07

## 2019-07-07 RX ADMIN — GUAIFENESIN AND DEXTROMETHORPHAN 10 ML: 100; 10 SYRUP ORAL at 06:07

## 2019-07-07 RX ADMIN — METHYLPREDNISOLONE SODIUM SUCCINATE 60 MG: 125 INJECTION, POWDER, FOR SOLUTION INTRAMUSCULAR; INTRAVENOUS at 06:07

## 2019-07-07 RX ADMIN — NICOTINE 1 PATCH: 21 PATCH, EXTENDED RELEASE TRANSDERMAL at 08:07

## 2019-07-07 RX ADMIN — DIGOXIN 125 MCG: 125 TABLET ORAL at 08:07

## 2019-07-07 RX ADMIN — BENZONATATE 200 MG: 100 CAPSULE ORAL at 03:07

## 2019-07-07 RX ADMIN — GUAIFENESIN AND DEXTROMETHORPHAN 10 ML: 100; 10 SYRUP ORAL at 12:07

## 2019-07-07 RX ADMIN — LEVETIRACETAM 1000 MG: 500 TABLET ORAL at 08:07

## 2019-07-07 RX ADMIN — BENZONATATE 200 MG: 100 CAPSULE ORAL at 08:07

## 2019-07-07 RX ADMIN — SPIRONOLACTONE 25 MG: 25 TABLET ORAL at 08:07

## 2019-07-07 NOTE — NURSING
D/C instructions given to patient regarding to follow up appt that needs to be made, medications that need to be picked up, and hard script for nebulizer. Pts brother given all information. Pt stated understanding. IV d/c'd pt tolerated well, pressure held. Pt in no distress. Walked out to family car.

## 2019-07-07 NOTE — PLAN OF CARE
Dr. Luis Angel Abrams notified patient does not have insurance and a prescription for a nebulizer is needed to purchase.

## 2019-07-07 NOTE — NURSING
Report received from JEWELS Norris. Patient awake and alert. NAD noted. Safety maintained. Will continue to monitor.

## 2019-07-07 NOTE — PLAN OF CARE
Problem: Adjustment to Illness COPD (Chronic Obstructive Pulmonary Disease)  Goal: Optimal Chronic Illness Coping    Intervention: Support and Optimize Psychosocial Response     07/07/19 0410   Promote Anxiety Reduction   Supportive Measures active listening utilized;verbalization of feelings encouraged;self-care encouraged         Problem: Functional Ability Impaired COPD (Chronic Obstructive Pulmonary Disease)  Goal: Optimal Level of Functional Vader    Intervention: Optimize Functional Ability     07/07/19 0410   Identify and Manage Contributors to Fall Injury Risk   Self-Care Promotion independence encouraged;BADL personal objects within reach         Problem: Oral Intake Inadequate COPD (Chronic Obstructive Pulmonary Disease)  Goal: Improved Nutrition Intake    Intervention: Promote and Optimize Nutrition     07/07/19 0410   Monitor and Manage Anemia   Oral Nutrition Promotion rest periods promoted;social interaction promoted         Problem: Respiratory Compromise COPD (Chronic Obstructive Pulmonary Disease)  Goal: Effective Oxygenation and Ventilation    Intervention: Promote Airway Secretion Clearance     07/07/19 0410   Promote Airway Secretion Clearance   Cough And Deep Breathing done independently per patient   Promote Airway Secretion Clearance   Activity Management activity adjusted per tolerance;activity clustered for rest period   Breathing Techniques/Airway Clearance diaphragmatic breathing promoted     Intervention: Optimize Oxygenation and Ventilation     07/07/19 0410   Support Asthma Symptom Control   Airway/Ventilation Management airway patency maintained   Prevent Additional Skin Injury   Head of Bed (HOB) HOB elevated

## 2019-07-07 NOTE — ASSESSMENT & PLAN NOTE
IV steroid- wean to oral dosing as clinical condition improves  Bronchodilators  Needs pulmnology follow up and PFTs  Encouraged to stop smoking - smoking cessation >5 minutes  Test for home O2  Nebulizer for dc home

## 2019-07-07 NOTE — PLAN OF CARE
07/07/19 1358   Final Note   Assessment Type Final Discharge Note   Anticipated Discharge Disposition Home   What phone number can be called within the next 1-3 days to see how you are doing after discharge? [phone]   Hospital Follow Up  Appt(s) scheduled? Yes   Discharge plans and expectations educations in teach back method with documentation complete? Yes   Right Care Referral Info   Post Acute Recommendation No Care

## 2019-07-07 NOTE — PROGRESS NOTES
[facility] Hospital Medicine  Progress Note    Patient Name: Pierre Pierson  MRN: [id number]  Patient Class: IP- Inpatient   Admission Date: 7/5/2019  Length of Stay: 1 days  Attending Physician: Samantha Barone MD  Primary Care Provider: [facility]        Subjective:     Principal Problem:Acute exacerbation of chronic obstructive pulmonary disease (COPD)      HPI:  Pierre Pierson is a 59 y.o. male that (in part)  has a past medical history of Asthma, COPD (chronic obstructive pulmonary disease), and Seizures.  has no past surgical history on file. Presents to [facility] Emergency Department complaining of acute on chronic shortness of breath.  Complains of chest pain with cough.  Denies syncope, edema, cyanosis, or palpitations.  Positive for wheezing and shortness of breath at rest.  Denies hemoptysis, stridor, or night sweats.  He is a tobacco smoker.  Denies fever chills.  History of COPD exacerbation in the past.  Given supplemental oxygen by EMS with little relief.    In the emergency department routine laboratory studies and chest x-ray obtained.  ABG revealed evidence of acute hypercapnic respiratory failure likely secondary to COPD exacerbation.  He was given supplemental oxygen, duo nebs, and started on BiPAP.    Hospital medicine has been asked to admit for further evaluation and treatment.       Overview/Hospital Course:  Pt admitted with COPD exacerbation requiring IV steroid and bipap.  Recently moved from [place] and has not established PCP or pulmonology care. No ton home O2 and attempting to wean O2 for dc home. Wean steroid and continue bronchodilator. Pt also does not have a nebulizer and would benefit with appropriate therapy. Pt counseled on smoking cessation. Pt thinks he may have had seizures prior to admission and counseled on compliancy with meds as well as no driving.     Ok to transfer to floor.  Wean steroid, attempt wean of  O2 and set up for outpatient follow up. Bipap PRN. Pt will not take bronchodilators unless its every 6-8 hours apart.     Interval History:pt reports breathing easier, coughing up more mucus    Review of Systems   Constitutional: Positive for activity change.   HENT: Negative.    Eyes: Negative.    Respiratory: Positive for cough, shortness of breath and wheezing.    Cardiovascular: Negative.    Gastrointestinal: Negative.    Endocrine: Negative.    Genitourinary: Negative.    Musculoskeletal: Negative.    Skin: Negative.    Neurological: Negative.    Psychiatric/Behavioral: Negative.      Objective:     Vital Signs (Most Recent):  Temp: 98.4 °F (36.9 °C) (07/06/19 1958)  Pulse: 66 (07/06/19 2104)  Resp: 18 (07/06/19 2104)  BP: 108/61 (07/06/19 1958)  SpO2: 100 % (07/06/19 2104) Vital Signs (24h Range):  Temp:  [97.7 °F (36.5 °C)-98.8 °F (37.1 °C)] 98.4 °F (36.9 °C)  Pulse:  [54-86] 66  Resp:  [15-50] 18  SpO2:  [94 %-100 %] 100 %  BP: ()/(55-74) 108/61     Weight: 57.1 kg (125 lb 14.1 oz)  Body mass index is 17.56 kg/m².    Intake/Output Summary (Last 24 hours) at 7/6/2019 3778  Last data filed at 7/6/2019 1800  Gross per 24 hour   Intake 240 ml   Output 675 ml   Net -435 ml      Physical Exam   Constitutional: He is oriented to person, place, and time. No distress.   Frail and cachetic appearing male    HENT:   Head: Normocephalic and atraumatic.   Mouth/Throat: Oropharynx is clear and moist.   Neck: Normal range of motion. Neck supple. No JVD present.   Cardiovascular: Normal rate, regular rhythm, normal heart sounds and intact distal pulses.   Pulmonary/Chest: Effort normal. No respiratory distress. He has wheezes.   Abdominal: Soft. Bowel sounds are normal. He exhibits no distension. There is no tenderness.   Musculoskeletal: Normal range of motion. He exhibits no edema.   Neurological: He is alert and oriented to person, place, and time.   Skin: Capillary refill takes 2 to 3 seconds.   Psychiatric: He  has a normal mood and affect. His behavior is normal.       Significant Labs:   ABGs:   Recent Labs   Lab 07/05/19  1643   PH 7.267*   PCO2 71.6*   HCO3 32.7*   POCSATURATED 92*   BE 4     BMP:   Recent Labs   Lab 07/05/19  1605   *      K 4.4      CO2 33*   BUN 13   CREATININE 0.8   CALCIUM 9.8     CBC:   Recent Labs   Lab 07/05/19  1605   WBC 12.81*   HGB 13.8*   HCT 45.0          Significant Imaging: I have reviewed and interpreted all pertinent imaging results/findings within the past 24 hours.      Assessment/Plan:      * Acute exacerbation of chronic obstructive pulmonary disease (COPD)  IV steroid- wean to oral dosing as clinical condition improves  Bronchodilators  Needs pulmnology follow up and PFTs  Encouraged to stop smoking - smoking cessation >5 minutes  Test for home O2  Nebulizer for dc home        Tobacco abuse  Counseled on smoking cessation >5 min      Seizure disorder  Resume keppra  Establish with neurology         Asthma  See above      Acute hypercapnic respiratory failure  Improved with bipap  See above         VTE Risk Mitigation (From admission, onward)        Ordered     apixaban tablet 5 mg  2 times daily      07/05/19 9023     IP VTE LOW RISK PATIENT  Once      07/05/19 1958     Place sequential compression device  Until discontinued      07/05/19 1958     Place KARY hose  Until discontinued      07/05/19 1958                Samantha Barone MD  Department of Hospital Medicine   [facility]

## 2019-07-07 NOTE — SUBJECTIVE & OBJECTIVE
Interval History:pt reports breathing easier, coughing up more mucus    Review of Systems   Constitutional: Positive for activity change.   HENT: Negative.    Eyes: Negative.    Respiratory: Positive for cough, shortness of breath and wheezing.    Cardiovascular: Negative.    Gastrointestinal: Negative.    Endocrine: Negative.    Genitourinary: Negative.    Musculoskeletal: Negative.    Skin: Negative.    Neurological: Negative.    Psychiatric/Behavioral: Negative.      Objective:     Vital Signs (Most Recent):  Temp: 98.4 °F (36.9 °C) (07/06/19 1958)  Pulse: 66 (07/06/19 2104)  Resp: 18 (07/06/19 2104)  BP: 108/61 (07/06/19 1958)  SpO2: 100 % (07/06/19 2104) Vital Signs (24h Range):  Temp:  [97.7 °F (36.5 °C)-98.8 °F (37.1 °C)] 98.4 °F (36.9 °C)  Pulse:  [54-86] 66  Resp:  [15-50] 18  SpO2:  [94 %-100 %] 100 %  BP: ()/(55-74) 108/61     Weight: 57.1 kg (125 lb 14.1 oz)  Body mass index is 17.56 kg/m².    Intake/Output Summary (Last 24 hours) at 7/6/2019 2258  Last data filed at 7/6/2019 1800  Gross per 24 hour   Intake 240 ml   Output 675 ml   Net -435 ml      Physical Exam   Constitutional: He is oriented to person, place, and time. No distress.   Frail and cachetic appearing male    HENT:   Head: Normocephalic and atraumatic.   Mouth/Throat: Oropharynx is clear and moist.   Neck: Normal range of motion. Neck supple. No JVD present.   Cardiovascular: Normal rate, regular rhythm, normal heart sounds and intact distal pulses.   Pulmonary/Chest: Effort normal. No respiratory distress. He has wheezes.   Abdominal: Soft. Bowel sounds are normal. He exhibits no distension. There is no tenderness.   Musculoskeletal: Normal range of motion. He exhibits no edema.   Neurological: He is alert and oriented to person, place, and time.   Skin: Capillary refill takes 2 to 3 seconds.   Psychiatric: He has a normal mood and affect. His behavior is normal.       Significant Labs:   ABGs:   Recent Labs   Lab 07/05/19  1643    PH 7.267*   PCO2 71.6*   HCO3 32.7*   POCSATURATED 92*   BE 4     BMP:   Recent Labs   Lab 07/05/19  1605   *      K 4.4      CO2 33*   BUN 13   CREATININE 0.8   CALCIUM 9.8     CBC:   Recent Labs   Lab 07/05/19  1605   WBC 12.81*   HGB 13.8*   HCT 45.0          Significant Imaging: I have reviewed and interpreted all pertinent imaging results/findings within the past 24 hours.

## 2019-07-07 NOTE — HOSPITAL COURSE
Pt admitted with COPD exacerbation requiring IV steroid and bipap.  Recently moved from [place] and has not established PCP or pulmonology care. Not on home O2 and attempting to wean O2 for dc home. Wean steroid and continue bronchodilator. Pt also does not have a nebulizer and would benefit with appropriate therapy. Pt counseled on smoking cessation. Pt thinks he may have had seizures prior to admission and counseled on compliancy with meds as well as no driving.   He was improving and stable to transfer to the floor on 7/6/2019.  Weaned steroid.  O2 weaned.  Pt will not take bronchodilators unless its every 6-8 hours apart.   He was ambulating laps around the unit on room air with no shortness of breath or hypoxia on 7/7/2019. He was feeling much better and asking to go home. He was medically stable for discharge. Prescriptions for all of his medications were sent to [facility] as he had had trouble getting his medications since moving to [place] from [facility]. A paper Rx was written for a nebulizer. He will need to complete his medicaid application. He needs to establish a new PCP, pulmonologist, and neurologist.

## 2019-07-07 NOTE — ASSESSMENT & PLAN NOTE
IV steroid- weaned to oral dosing as clinical condition improves  Bronchodilators  Needs pulmnology follow up and PFTs  Encouraged to stop smoking - smoking cessation >5 minutes  Test for home O2  Nebulizer for dc home

## 2019-07-07 NOTE — DISCHARGE SUMMARY
[facility] Medicine  Discharge Summary      Patient Name: Pierre Pierson  MRN: [id number]  Admission Date: 7/5/2019  Hospital Length of Stay: 2 days  Discharge Date and Time: 7/7/2019  3:13 PM  Attending Physician: No att. providers found   Discharging Provider: Kristyn Mckeon MD  Primary Care Provider: [facility]      HPI:   Pierre Pierson is a 60 yo man with A fib on Eliquis, COPD, tobacco abuse, and seizures who presented with acute on chronic shortness of breath.  Complains of chest pain with cough.  Denies syncope, edema, cyanosis, or palpitations.  Positive for wheezing and shortness of breath at rest.  Denies hemoptysis, stridor, or night sweats.  He is a tobacco smoker.  Denies fever chills.  History of COPD exacerbation in the past.  Given supplemental oxygen by EMS with little relief.    In the emergency department routine laboratory studies and chest x-ray obtained.  ABG revealed evidence of acute hypercapnic respiratory failure likely secondary to COPD exacerbation.  He was given supplemental oxygen, duo nebs, and started on BiPAP.    Hospital Course:   Pt admitted with COPD exacerbation requiring IV steroid and bipap.  Recently moved from [place] and has not established PCP or pulmonology care. Not on home O2 and attempting to wean O2 for dc home. Wean steroid and continue bronchodilator. Pt also does not have a nebulizer and would benefit with appropriate therapy. Pt counseled on smoking cessation. Pt thinks he may have had seizures prior to admission and counseled on compliancy with meds as well as no driving.   He was improving and stable to transfer to the floor on 7/6/2019.  Weaned steroid.  O2 weaned.  Pt will not take bronchodilators unless its every 6-8 hours apart.   He was ambulating laps around the unit on room air with no shortness of breath or hypoxia on 7/7/2019. He was feeling much better and asking to go home. He was medically stable  "for discharge. Prescriptions for all of his medications were sent to [facility] as he had had trouble getting his medications since moving to [place] from [facility]. A paper Rx was written for a nebulizer. He will need to complete his medicaid application. He needs to establish a new PCP, pulmonologist, and neurologist.       * Acute exacerbation of chronic obstructive pulmonary disease (COPD)  IV steroid- weaned to oral dosing as clinical condition improves  Bronchodilators  Needs pulmnology follow up and PFTs  Encouraged to stop smoking - smoking cessation >5 minutes  Test for home O2  Nebulizer for dc home    Atrial fibrillation, chronic  Continue home dig and Eliquis    Tobacco abuse  Counseled on smoking cessation >5 min    Seizure disorder  Resume keppra  Establish with neurology     Asthma  See above    Acute hypercapnic respiratory failure  Improved with bipap  See above       Final Active Diagnoses:    Diagnosis Date Noted POA    PRINCIPAL PROBLEM:  Acute exacerbation of chronic obstructive pulmonary disease (COPD) [J44.1] 07/05/2019 Yes    Atrial fibrillation, chronic [I48.2] 07/07/2019 Yes     Chronic    Acute hypercapnic respiratory failure [J96.02] 07/05/2019 Yes    Asthma [J45.909] 07/05/2019 Yes     Chronic    Seizure disorder [G40.909] 07/05/2019 Yes     Chronic    Tobacco abuse [Z72.0] 07/05/2019 Yes     Chronic      Problems Resolved During this Admission:       Discharged Condition: stable    Disposition: Home or Self Care    Follow Up:  Follow-up Information     [facility].    Why:  Call on Monday to make appointment  Contact information:  [address] [place] [id number]  [phone]                 Patient Instructions:      NEBULIZER FOR HOME USE     Order Specific Question Answer Comments   Height: 5' 11" (1.803 m)    Weight: 57.1 kg (125 lb 14.1 oz)    Does patient have medical equipment at home? none    Length of need (1-99 months): 99      Ambulatory " Referral to Pulmonology   Referral Priority: Routine Referral Type: Consultation   Referral Reason: Specialty Services Required   Requested Specialty: Pulmonary Disease   Number of Visits Requested: 1     Ambulatory Referral to Neurology   Referral Priority: Routine Referral Type: Consultation   Referral Reason: Specialty Services Required   Requested Specialty: Neurology   Number of Visits Requested: 1     Diet Cardiac     Notify your health care provider if you experience any of the following:  increased confusion or weakness     Notify your health care provider if you experience any of the following:  persistent dizziness, light-headedness, or visual disturbances     Notify your health care provider if you experience any of the following:  worsening rash     Notify your health care provider if you experience any of the following:  severe persistent headache     Notify your health care provider if you experience any of the following:  difficulty breathing or increased cough     Notify your health care provider if you experience any of the following:  redness, tenderness, or signs of infection (pain, swelling, redness, odor or green/yellow discharge around incision site)     Notify your health care provider if you experience any of the following:  severe uncontrolled pain     Notify your health care provider if you experience any of the following:  persistent nausea and vomiting or diarrhea     Notify your health care provider if you experience any of the following:  temperature >100.4     Activity as tolerated         Medications:  Reconciled Home Medications:      Medication List      START taking these medications    predniSONE 20 MG tablet  Commonly known as:  DELTASONE  Take 2 tablets (40 mg total) by mouth once daily. for 3 days  Start taking on:  7/8/2019        CHANGE how you take these medications    umeclidinium-vilanterol 62.5-25 mcg/actuation Dsdv  Commonly known as:  ANORO ELLIPTA  Inhale 1 puff into  the lungs once daily. Controller  What changed:    · how much to take  · when to take this        CONTINUE taking these medications    albuterol 1.25 mg/3 mL Nebu  Commonly known as:  ACCUNEB  Take 3 mLs (1.25 mg total) by nebulization every 6 (six) hours as needed. Rescue     apixaban 5 mg Tab  Commonly known as:  ELIQUIS  Take 1 tablet (5 mg total) by mouth 2 (two) times daily.     digoxin 125 mcg tablet  Commonly known as:  LANOXIN  Take 1 tablet (125 mcg total) by mouth once daily.     famotidine 20 MG tablet  Commonly known as:  PEPCID  Take 20 mg by mouth 2 (two) times daily.     levETIRAcetam 1000 MG tablet  Commonly known as:  KEPPRA  Take 1 tablet (1,000 mg total) by mouth 2 (two) times daily.     multivitamin per tablet  Commonly known as:  THERAGRAN  Take 1 tablet by mouth once daily.     spironolactone 25 MG tablet  Commonly known as:  ALDACTONE  Take 1 tablet (25 mg total) by mouth once daily.        STOP taking these medications    magnesium 200 mg Tab     naproxen sodium 220 MG tablet  Commonly known as:  ANAPROX            Indwelling Lines/Drains at time of discharge:   Lines/Drains/Airways          None          Time spent on the discharge of patient: 40 minutes  Patient was seen and examined on the date of discharge and determined to be suitable for discharge.         Kristyn Mckeon MD  Department of Hospital Medicine  [facility]

## 2019-07-07 NOTE — PLAN OF CARE
WRITTEN DISCHARGE INFORMATION:   Things that YOU are responsible for, to HELP YOU, Manage Your Care At Home:  1. Getting your prescriptions filled.  2. Taking you medications as directed. DO NOT MISS ANY DOSES!  3. Going to your follow-up doctor appointments. This is important because it allows the doctor to monitor your progress and to determine if any changes need to be made to your treatment plan.                                                         Help at Home  After discharge for assistance [facility] 24/7 assistance  [phone]   Thank you for choosing [facility] for your care.    Sincerely, Your [facility] Healthcare Manager is,  Belinda [phone]    Follow-up Information     [facility].    Why:  Call on Monday to make appointment  Contact information:  [address]  [facility] [address]  [phone]

## 2019-10-11 ENCOUNTER — HOSPITAL ENCOUNTER (INPATIENT)
Facility: HOSPITAL | Age: 59
LOS: 4 days | Discharge: HOME | DRG: 312 | End: 2019-10-15
Attending: EMERGENCY MEDICINE | Admitting: EMERGENCY MEDICINE
Payer: MEDICAID

## 2019-10-11 DIAGNOSIS — I42.9 CARDIOMYOPATHY: ICD-10-CM

## 2019-10-11 DIAGNOSIS — E16.2 HYPOGLYCEMIA: ICD-10-CM

## 2019-10-11 DIAGNOSIS — R55 SYNCOPE AND COLLAPSE: ICD-10-CM

## 2019-10-11 DIAGNOSIS — I48.91 A-FIB: ICD-10-CM

## 2019-10-11 DIAGNOSIS — G40.919 BREAKTHROUGH SEIZURE: ICD-10-CM

## 2019-10-11 DIAGNOSIS — R56.9 SEIZURE: ICD-10-CM

## 2019-10-11 DIAGNOSIS — I50.42 CHRONIC COMBINED SYSTOLIC AND DIASTOLIC HEART FAILURE: ICD-10-CM

## 2019-10-11 DIAGNOSIS — I95.9 HYPOTENSION, UNSPECIFIED HYPOTENSION TYPE: Primary | ICD-10-CM

## 2019-10-11 PROBLEM — I48.0 PAF (PAROXYSMAL ATRIAL FIBRILLATION): Status: ACTIVE | Noted: 2019-07-07

## 2019-10-11 PROBLEM — I50.43 ACUTE ON CHRONIC COMBINED SYSTOLIC AND DIASTOLIC HEART FAILURE: Status: ACTIVE | Noted: 2019-10-11

## 2019-10-11 PROBLEM — E87.20 ACIDOSIS, METABOLIC: Status: ACTIVE | Noted: 2019-10-11

## 2019-10-11 PROBLEM — F10.10 ETOH ABUSE: Status: ACTIVE | Noted: 2019-10-11

## 2019-10-11 PROBLEM — I95.2 HYPOTENSION DUE TO DRUGS: Status: ACTIVE | Noted: 2019-10-11

## 2019-10-11 LAB
ALBUMIN SERPL BCP-MCNC: 4.8 G/DL (ref 3.5–5.2)
ALLENS TEST: ABNORMAL
ALP SERPL-CCNC: 72 U/L (ref 55–135)
ALT SERPL W/O P-5'-P-CCNC: 11 U/L (ref 10–44)
AMPHET+METHAMPHET UR QL: NEGATIVE
ANION GAP SERPL CALC-SCNC: 17 MMOL/L (ref 8–16)
AORTIC ROOT ANNULUS: 4.18 CM
AORTIC VALVE CUSP SEPERATION: 2.27 CM
ASCENDING AORTA: 3.95 CM
AST SERPL-CCNC: 21 U/L (ref 10–40)
AV INDEX (PROSTH): 0.38
AV MEAN GRADIENT: 14 MMHG
AV PEAK GRADIENT: 23 MMHG
AV VALVE AREA: 1.96 CM2
AV VELOCITY RATIO: 0.4
BARBITURATES UR QL SCN>200 NG/ML: NEGATIVE
BASOPHILS # BLD AUTO: 0.04 K/UL (ref 0–0.2)
BASOPHILS NFR BLD: 0.4 % (ref 0–1.9)
BENZODIAZ UR QL SCN>200 NG/ML: NEGATIVE
BILIRUB SERPL-MCNC: 1.1 MG/DL (ref 0.1–1)
BILIRUB UR QL STRIP: NEGATIVE
BSA FOR ECHO PROCEDURE: 1.75 M2
BUN SERPL-MCNC: 20 MG/DL (ref 6–20)
BZE UR QL SCN: NEGATIVE
CALCIUM SERPL-MCNC: 9.6 MG/DL (ref 8.7–10.5)
CANNABINOIDS UR QL SCN: NEGATIVE
CHLORIDE SERPL-SCNC: 107 MMOL/L (ref 95–110)
CLARITY UR: CLEAR
CO2 SERPL-SCNC: 20 MMOL/L (ref 23–29)
COLOR UR: YELLOW
CREAT SERPL-MCNC: 1 MG/DL (ref 0.5–1.4)
CREAT UR-MCNC: 172.5 MG/DL (ref 23–375)
CV ECHO LV RWT: 0.3 CM
DELSYS: ABNORMAL
DIFFERENTIAL METHOD: ABNORMAL
DIGOXIN SERPL-MCNC: 0.2 NG/ML (ref 0.8–2)
DOP CALC AO PEAK VEL: 2.39 M/S
DOP CALC AO VTI: 45.98 CM
DOP CALC LVOT AREA: 5.1 CM2
DOP CALC LVOT DIAMETER: 2.55 CM
DOP CALC LVOT PEAK VEL: 0.96 M/S
DOP CALC LVOT STROKE VOLUME: 90.09 CM3
DOP CALCLVOT PEAK VEL VTI: 17.65 CM
E WAVE DECELERATION TIME: 95.05 MSEC
E/A RATIO: 0.57
E/E' RATIO: 21.43 M/S
ECHO LV POSTERIOR WALL: 1.06 CM (ref 0.6–1.1)
EOSINOPHIL # BLD AUTO: 0.1 K/UL (ref 0–0.5)
EOSINOPHIL NFR BLD: 0.8 % (ref 0–8)
ERYTHROCYTE [DISTWIDTH] IN BLOOD BY AUTOMATED COUNT: 13.2 % (ref 11.5–14.5)
EST. GFR  (AFRICAN AMERICAN): >60 ML/MIN/1.73 M^2
EST. GFR  (NON AFRICAN AMERICAN): >60 ML/MIN/1.73 M^2
ETHANOL SERPL-MCNC: 104 MG/DL
FIO2: 21
FRACTIONAL SHORTENING: 7 % (ref 28–44)
GLUCOSE SERPL-MCNC: 50 MG/DL (ref 70–110)
GLUCOSE UR QL STRIP: NEGATIVE
HCO3 UR-SCNC: 21.2 MMOL/L (ref 24–28)
HCT VFR BLD AUTO: 42 % (ref 40–54)
HGB BLD-MCNC: 13.9 G/DL (ref 14–18)
HGB UR QL STRIP: NEGATIVE
IMM GRANULOCYTES # BLD AUTO: 0.03 K/UL (ref 0–0.04)
IMM GRANULOCYTES NFR BLD AUTO: 0.3 % (ref 0–0.5)
INTERVENTRICULAR SEPTUM: 1.06 CM (ref 0.6–1.1)
IVRT: 0.11 MSEC
KETONES UR QL STRIP: ABNORMAL
LA MAJOR: 5.54 CM
LA MINOR: 5.46 CM
LA WIDTH: 4.36 CM
LACTATE SERPL-SCNC: 3.4 MMOL/L (ref 0.5–2.2)
LACTATE SERPL-SCNC: 4 MMOL/L (ref 0.5–2.2)
LEFT ATRIUM SIZE: 3.57 CM
LEFT ATRIUM VOLUME INDEX: 40.9 ML/M2
LEFT ATRIUM VOLUME: 72.76 CM3
LEFT INTERNAL DIMENSION IN SYSTOLE: 6.59 CM (ref 2.1–4)
LEFT VENTRICLE DIASTOLIC VOLUME INDEX: 146.8 ML/M2
LEFT VENTRICLE DIASTOLIC VOLUME: 261.35 ML
LEFT VENTRICLE MASS INDEX: 198 G/M2
LEFT VENTRICLE SYSTOLIC VOLUME INDEX: 125.3 ML/M2
LEFT VENTRICLE SYSTOLIC VOLUME: 223.12 ML
LEFT VENTRICULAR INTERNAL DIMENSION IN DIASTOLE: 7.07 CM (ref 3.5–6)
LEFT VENTRICULAR MASS: 352.33 G
LEUKOCYTE ESTERASE UR QL STRIP: NEGATIVE
LV LATERAL E/E' RATIO: 25 M/S
LV SEPTAL E/E' RATIO: 18.75 M/S
LYMPHOCYTES # BLD AUTO: 1.9 K/UL (ref 1–4.8)
LYMPHOCYTES NFR BLD: 17.8 % (ref 18–48)
MCH RBC QN AUTO: 32.2 PG (ref 27–31)
MCHC RBC AUTO-ENTMCNC: 33.1 G/DL (ref 32–36)
MCV RBC AUTO: 97 FL (ref 82–98)
METHADONE UR QL SCN>300 NG/ML: NEGATIVE
MODE: ABNORMAL
MONOCYTES # BLD AUTO: 0.6 K/UL (ref 0.3–1)
MONOCYTES NFR BLD: 5.6 % (ref 4–15)
MV PEAK A VEL: 1.32 M/S
MV PEAK E VEL: 0.75 M/S
NEUTROPHILS # BLD AUTO: 7.9 K/UL (ref 1.8–7.7)
NEUTROPHILS NFR BLD: 75.1 % (ref 38–73)
NITRITE UR QL STRIP: NEGATIVE
NRBC BLD-RTO: 0 /100 WBC
OPIATES UR QL SCN: NEGATIVE
PCO2 BLDA: 42.8 MMHG (ref 35–45)
PCP UR QL SCN>25 NG/ML: NEGATIVE
PH SMN: 7.3 [PH] (ref 7.35–7.45)
PH UR STRIP: 5 [PH] (ref 5–8)
PISA TR MAX VEL: 1.84 M/S
PLATELET # BLD AUTO: 199 K/UL (ref 150–350)
PMV BLD AUTO: 9.5 FL (ref 9.2–12.9)
PO2 BLDA: 56 MMHG (ref 40–60)
POC BE: -5 MMOL/L
POC SATURATED O2: 86 % (ref 95–100)
POC TCO2: 23 MMOL/L (ref 24–29)
POCT GLUCOSE: 101 MG/DL (ref 70–110)
POCT GLUCOSE: 94 MG/DL (ref 70–110)
POTASSIUM SERPL-SCNC: 4.2 MMOL/L (ref 3.5–5.1)
PROT SERPL-MCNC: 7.4 G/DL (ref 6–8.4)
PROT UR QL STRIP: NEGATIVE
PULM VEIN S/D RATIO: 1.49
PV PEAK D VEL: 0.37 M/S
PV PEAK S VEL: 0.55 M/S
PV PEAK VELOCITY: 1.04 CM/S
RA MAJOR: 4.55 CM
RA PRESSURE: 8 MMHG
RA WIDTH: 3.68 CM
RBC # BLD AUTO: 4.32 M/UL (ref 4.6–6.2)
RIGHT VENTRICULAR END-DIASTOLIC DIMENSION: 4.08 CM
RV TISSUE DOPPLER FREE WALL SYSTOLIC VELOCITY 1 (APICAL 4 CHAMBER VIEW): 9.1 CM/S
SAMPLE: ABNORMAL
SINUS: 4.05 CM
SITE: ABNORMAL
SODIUM SERPL-SCNC: 144 MMOL/L (ref 136–145)
SP GR UR STRIP: 1.01 (ref 1–1.03)
STJ: 3.45 CM
TDI LATERAL: 0.03 M/S
TDI SEPTAL: 0.04 M/S
TDI: 0.04 M/S
TOXICOLOGY INFORMATION: NORMAL
TR MAX PG: 14 MMHG
TRICUSPID ANNULAR PLANE SYSTOLIC EXCURSION: 2.08 CM
TROPONIN I SERPL DL<=0.01 NG/ML-MCNC: 0.01 NG/ML (ref 0–0.03)
TROPONIN I SERPL DL<=0.01 NG/ML-MCNC: 0.02 NG/ML (ref 0–0.03)
TROPONIN I SERPL DL<=0.01 NG/ML-MCNC: 0.03 NG/ML (ref 0–0.03)
TSH SERPL DL<=0.005 MIU/L-ACNC: 0.47 UIU/ML (ref 0.4–4)
TV REST PULMONARY ARTERY PRESSURE: 22 MMHG
URN SPEC COLLECT METH UR: ABNORMAL
UROBILINOGEN UR STRIP-ACNC: NEGATIVE EU/DL
WBC # BLD AUTO: 10.44 K/UL (ref 3.9–12.7)

## 2019-10-11 PROCEDURE — 96375 TX/PRO/DX INJ NEW DRUG ADDON: CPT

## 2019-10-11 PROCEDURE — 99900035 HC TECH TIME PER 15 MIN (STAT)

## 2019-10-11 PROCEDURE — 93010 EKG 12-LEAD: ICD-10-PCS | Mod: ,,, | Performed by: INTERNAL MEDICINE

## 2019-10-11 PROCEDURE — 80320 DRUG SCREEN QUANTALCOHOLS: CPT

## 2019-10-11 PROCEDURE — 99233 PR SUBSEQUENT HOSPITAL CARE,LEVL III: ICD-10-PCS | Mod: 25,,, | Performed by: INTERNAL MEDICINE

## 2019-10-11 PROCEDURE — 63600175 PHARM REV CODE 636 W HCPCS: Performed by: EMERGENCY MEDICINE

## 2019-10-11 PROCEDURE — 81003 URINALYSIS AUTO W/O SCOPE: CPT | Mod: 59

## 2019-10-11 PROCEDURE — 25000003 PHARM REV CODE 250: Performed by: EMERGENCY MEDICINE

## 2019-10-11 PROCEDURE — 85025 COMPLETE CBC W/AUTO DIFF WBC: CPT

## 2019-10-11 PROCEDURE — 25000242 PHARM REV CODE 250 ALT 637 W/ HCPCS: Performed by: EMERGENCY MEDICINE

## 2019-10-11 PROCEDURE — 84443 ASSAY THYROID STIM HORMONE: CPT

## 2019-10-11 PROCEDURE — 25000003 PHARM REV CODE 250: Performed by: NURSE PRACTITIONER

## 2019-10-11 PROCEDURE — 82803 BLOOD GASES ANY COMBINATION: CPT

## 2019-10-11 PROCEDURE — 80162 ASSAY OF DIGOXIN TOTAL: CPT

## 2019-10-11 PROCEDURE — 96365 THER/PROPH/DIAG IV INF INIT: CPT

## 2019-10-11 PROCEDURE — 83605 ASSAY OF LACTIC ACID: CPT | Mod: 91

## 2019-10-11 PROCEDURE — 99232 PR SUBSEQUENT HOSPITAL CARE,LEVL II: ICD-10-PCS | Mod: ,,, | Performed by: PSYCHIATRY & NEUROLOGY

## 2019-10-11 PROCEDURE — 94761 N-INVAS EAR/PLS OXIMETRY MLT: CPT

## 2019-10-11 PROCEDURE — 63600175 PHARM REV CODE 636 W HCPCS: Performed by: NURSE PRACTITIONER

## 2019-10-11 PROCEDURE — 84484 ASSAY OF TROPONIN QUANT: CPT

## 2019-10-11 PROCEDURE — 80177 DRUG SCRN QUAN LEVETIRACETAM: CPT

## 2019-10-11 PROCEDURE — 99233 SBSQ HOSP IP/OBS HIGH 50: CPT | Mod: 25,,, | Performed by: INTERNAL MEDICINE

## 2019-10-11 PROCEDURE — 80053 COMPREHEN METABOLIC PANEL: CPT

## 2019-10-11 PROCEDURE — 94640 AIRWAY INHALATION TREATMENT: CPT

## 2019-10-11 PROCEDURE — 21400001 HC TELEMETRY ROOM

## 2019-10-11 PROCEDURE — 99232 SBSQ HOSP IP/OBS MODERATE 35: CPT | Mod: ,,, | Performed by: PSYCHIATRY & NEUROLOGY

## 2019-10-11 PROCEDURE — 93005 ELECTROCARDIOGRAM TRACING: CPT

## 2019-10-11 PROCEDURE — 96366 THER/PROPH/DIAG IV INF ADDON: CPT

## 2019-10-11 PROCEDURE — 25000003 PHARM REV CODE 250: Performed by: INTERNAL MEDICINE

## 2019-10-11 PROCEDURE — 80307 DRUG TEST PRSMV CHEM ANLYZR: CPT

## 2019-10-11 PROCEDURE — 99285 EMERGENCY DEPT VISIT HI MDM: CPT | Mod: 25

## 2019-10-11 PROCEDURE — 93010 ELECTROCARDIOGRAM REPORT: CPT | Mod: ,,, | Performed by: INTERNAL MEDICINE

## 2019-10-11 PROCEDURE — 36415 COLL VENOUS BLD VENIPUNCTURE: CPT

## 2019-10-11 RX ORDER — ONDANSETRON 2 MG/ML
4 INJECTION INTRAMUSCULAR; INTRAVENOUS EVERY 8 HOURS PRN
Status: DISCONTINUED | OUTPATIENT
Start: 2019-10-11 | End: 2019-10-15 | Stop reason: HOSPADM

## 2019-10-11 RX ORDER — AMOXICILLIN 250 MG
1 CAPSULE ORAL 2 TIMES DAILY
Status: DISCONTINUED | OUTPATIENT
Start: 2019-10-11 | End: 2019-10-15 | Stop reason: HOSPADM

## 2019-10-11 RX ORDER — LEVETIRACETAM 500 MG/1
1000 TABLET ORAL 2 TIMES DAILY
Status: DISCONTINUED | OUTPATIENT
Start: 2019-10-11 | End: 2019-10-15 | Stop reason: HOSPADM

## 2019-10-11 RX ORDER — SODIUM CHLORIDE 9 MG/ML
INJECTION, SOLUTION INTRAVENOUS CONTINUOUS
Status: DISCONTINUED | OUTPATIENT
Start: 2019-10-11 | End: 2019-10-11

## 2019-10-11 RX ORDER — ENOXAPARIN SODIUM 100 MG/ML
40 INJECTION SUBCUTANEOUS EVERY 24 HOURS
Status: DISCONTINUED | OUTPATIENT
Start: 2019-10-11 | End: 2019-10-15 | Stop reason: HOSPADM

## 2019-10-11 RX ORDER — OXYCODONE HYDROCHLORIDE 5 MG/1
5 TABLET ORAL EVERY 4 HOURS PRN
Status: DISCONTINUED | OUTPATIENT
Start: 2019-10-11 | End: 2019-10-11

## 2019-10-11 RX ORDER — CARVEDILOL 3.12 MG/1
3.12 TABLET ORAL 2 TIMES DAILY
Status: DISCONTINUED | OUTPATIENT
Start: 2019-10-11 | End: 2019-10-15 | Stop reason: HOSPADM

## 2019-10-11 RX ORDER — IPRATROPIUM BROMIDE AND ALBUTEROL SULFATE 2.5; .5 MG/3ML; MG/3ML
3 SOLUTION RESPIRATORY (INHALATION)
Status: COMPLETED | OUTPATIENT
Start: 2019-10-11 | End: 2019-10-11

## 2019-10-11 RX ORDER — DIGOXIN 125 MCG
125 TABLET ORAL DAILY
Status: DISCONTINUED | OUTPATIENT
Start: 2019-10-11 | End: 2019-10-11

## 2019-10-11 RX ORDER — FAMOTIDINE 20 MG/1
20 TABLET, FILM COATED ORAL 2 TIMES DAILY
Status: DISCONTINUED | OUTPATIENT
Start: 2019-10-11 | End: 2019-10-15

## 2019-10-11 RX ORDER — DIAZEPAM 2 MG/1
2 TABLET ORAL EVERY 8 HOURS
Status: DISCONTINUED | OUTPATIENT
Start: 2019-10-11 | End: 2019-10-14

## 2019-10-11 RX ORDER — TAMSULOSIN HYDROCHLORIDE 0.4 MG/1
0.4 CAPSULE ORAL DAILY
Status: DISCONTINUED | OUTPATIENT
Start: 2019-10-11 | End: 2019-10-15 | Stop reason: HOSPADM

## 2019-10-11 RX ORDER — SODIUM CHLORIDE 0.9 % (FLUSH) 0.9 %
10 SYRINGE (ML) INJECTION
Status: DISCONTINUED | OUTPATIENT
Start: 2019-10-11 | End: 2019-10-15 | Stop reason: HOSPADM

## 2019-10-11 RX ORDER — THIAMINE HCL 100 MG
100 TABLET ORAL DAILY
Status: DISCONTINUED | OUTPATIENT
Start: 2019-10-11 | End: 2019-10-15 | Stop reason: HOSPADM

## 2019-10-11 RX ORDER — FOLIC ACID 1 MG/1
1 TABLET ORAL DAILY
Status: DISCONTINUED | OUTPATIENT
Start: 2019-10-11 | End: 2019-10-15 | Stop reason: HOSPADM

## 2019-10-11 RX ORDER — OXYCODONE HYDROCHLORIDE 5 MG/1
5 TABLET ORAL EVERY 6 HOURS PRN
Status: DISCONTINUED | OUTPATIENT
Start: 2019-10-11 | End: 2019-10-15 | Stop reason: HOSPADM

## 2019-10-11 RX ORDER — IBUPROFEN 400 MG/1
800 TABLET ORAL EVERY 6 HOURS PRN
Status: DISCONTINUED | OUTPATIENT
Start: 2019-10-11 | End: 2019-10-15

## 2019-10-11 RX ORDER — DEXTROSE 50 % IN WATER (D50W) INTRAVENOUS SYRINGE
12.5
Status: COMPLETED | OUTPATIENT
Start: 2019-10-11 | End: 2019-10-11

## 2019-10-11 RX ORDER — ALBUTEROL SULFATE 90 UG/1
2 AEROSOL, METERED RESPIRATORY (INHALATION) EVERY 4 HOURS PRN
Status: DISCONTINUED | OUTPATIENT
Start: 2019-10-11 | End: 2019-10-15 | Stop reason: HOSPADM

## 2019-10-11 RX ADMIN — Medication 100 MG: at 10:10

## 2019-10-11 RX ADMIN — IPRATROPIUM BROMIDE AND ALBUTEROL SULFATE 3 ML: .5; 3 SOLUTION RESPIRATORY (INHALATION) at 05:10

## 2019-10-11 RX ADMIN — Medication 12.5 G: at 05:10

## 2019-10-11 RX ADMIN — FAMOTIDINE 20 MG: 20 TABLET ORAL at 09:10

## 2019-10-11 RX ADMIN — OXYCODONE HYDROCHLORIDE 5 MG: 5 TABLET ORAL at 01:10

## 2019-10-11 RX ADMIN — IPRATROPIUM BROMIDE AND ALBUTEROL SULFATE 3 ML: .5; 3 SOLUTION RESPIRATORY (INHALATION) at 04:10

## 2019-10-11 RX ADMIN — FOLIC ACID: 5 INJECTION, SOLUTION INTRAMUSCULAR; INTRAVENOUS; SUBCUTANEOUS at 05:10

## 2019-10-11 RX ADMIN — FAMOTIDINE 20 MG: 20 TABLET ORAL at 08:10

## 2019-10-11 RX ADMIN — SENNOSIDES, DOCUSATE SODIUM 1 TABLET: 50; 8.6 TABLET, FILM COATED ORAL at 09:10

## 2019-10-11 RX ADMIN — DIAZEPAM 2 MG: 2 TABLET ORAL at 09:10

## 2019-10-11 RX ADMIN — DIAZEPAM 2 MG: 2 TABLET ORAL at 10:10

## 2019-10-11 RX ADMIN — LEVETIRACETAM 1000 MG: 500 TABLET ORAL at 09:10

## 2019-10-11 RX ADMIN — SODIUM CHLORIDE 500 ML: 0.9 INJECTION, SOLUTION INTRAVENOUS at 05:10

## 2019-10-11 RX ADMIN — CARVEDILOL 3.12 MG: 3.12 TABLET, FILM COATED ORAL at 08:10

## 2019-10-11 RX ADMIN — FOLIC ACID 1 MG: 1 TABLET ORAL at 09:10

## 2019-10-11 RX ADMIN — SODIUM CHLORIDE: 0.9 INJECTION, SOLUTION INTRAVENOUS at 09:10

## 2019-10-11 RX ADMIN — ENOXAPARIN SODIUM 40 MG: 100 INJECTION SUBCUTANEOUS at 04:10

## 2019-10-11 RX ADMIN — SENNOSIDES, DOCUSATE SODIUM 1 TABLET: 50; 8.6 TABLET, FILM COATED ORAL at 08:10

## 2019-10-11 RX ADMIN — LEVETIRACETAM 1000 MG: 500 TABLET ORAL at 08:10

## 2019-10-11 RX ADMIN — TAMSULOSIN HYDROCHLORIDE 0.4 MG: 0.4 CAPSULE ORAL at 08:10

## 2019-10-11 RX ADMIN — DIGOXIN 125 MCG: 125 TABLET ORAL at 09:10

## 2019-10-11 NOTE — SUBJECTIVE & OBJECTIVE
Past Medical History:   Diagnosis Date    Asthma     COPD (chronic obstructive pulmonary disease)     Seizures        History reviewed. No pertinent surgical history.    Review of patient's allergies indicates:   Allergen Reactions    Keflex [cephalexin]        No current facility-administered medications on file prior to encounter.      Current Outpatient Medications on File Prior to Encounter   Medication Sig    albuterol (ACCUNEB) 1.25 mg/3 mL Nebu Take 3 mLs (1.25 mg total) by nebulization every 6 (six) hours as needed. Rescue    digoxin (LANOXIN) 125 mcg tablet Take 1 tablet (125 mcg total) by mouth once daily.    famotidine (PEPCID) 20 MG tablet Take 20 mg by mouth 2 (two) times daily.    levETIRAcetam (KEPPRA) 1000 MG tablet Take 1 tablet (1,000 mg total) by mouth 2 (two) times daily.    multivitamin (THERAGRAN) per tablet Take 1 tablet by mouth once daily.    spironolactone (ALDACTONE) 25 MG tablet Take 1 tablet (25 mg total) by mouth once daily.    umeclidinium-vilanterol (ANORO ELLIPTA) 62.5-25 mcg/actuation DsDv Inhale 1 puff into the lungs once daily. Controller     Family History     None        Tobacco Use    Smoking status: Current Every Day Smoker     Packs/day: 0.50     Types: Cigarettes   Substance and Sexual Activity    Alcohol use: Yes     Comment: some day drinker    Drug use: Not Currently    Sexual activity: Not on file     Review of Systems   Constitutional: Negative for appetite change, chills, diaphoresis and fever.        Body mass index is 18.73 kg/m².; frail; small framed   HENT: Negative for congestion, hearing loss, sore throat, tinnitus and trouble swallowing.    Eyes: Negative for photophobia, discharge, itching and visual disturbance.   Respiratory: Negative for apnea, cough, shortness of breath, wheezing and stridor.    Cardiovascular: Negative for chest pain, palpitations and leg swelling.   Gastrointestinal: Negative for abdominal distention, abdominal pain, blood  in stool, constipation, diarrhea and nausea.   Endocrine: Negative for polydipsia, polyphagia and polyuria.   Genitourinary: Negative for difficulty urinating, dysuria, flank pain and frequency.   Musculoskeletal: Negative for arthralgias, joint swelling and neck stiffness.   Skin: Negative for color change, rash and wound.   Neurological: Positive for syncope. Negative for dizziness, tremors, seizures, light-headedness, numbness and headaches.        LOC   Hematological: Negative for adenopathy.   Psychiatric/Behavioral: Negative for hallucinations and self-injury.        Forgetful     Objective:     Vital Signs (Most Recent):  Temp: 98 °F (36.7 °C) (10/11/19 0710)  Pulse: 101 (10/11/19 0706)  Resp: (!) 23 (10/11/19 0706)  BP: (!) 94/59 (10/11/19 0706)  SpO2: (!) 93 % (10/11/19 0706) Vital Signs (24h Range):  Temp:  [98 °F (36.7 °C)-99 °F (37.2 °C)] 98 °F (36.7 °C)  Pulse:  [] 101  Resp:  [14-28] 23  SpO2:  [91 %-98 %] 93 %  BP: ()/(50-71) 94/59     Weight: 61.2 kg (135 lb)  Body mass index is 18.83 kg/m².    Physical Exam   Constitutional: He is oriented to person, place, and time. He is cooperative.   Body mass index is 18.73 kg/m²; frail; small framed   HENT:   Head: Normocephalic and atraumatic.   Eyes: Pupils are equal, round, and reactive to light. Conjunctivae, EOM and lids are normal.   Neck: Normal range of motion and full passive range of motion without pain. Neck supple. No JVD present. No edema present. No thyroid mass present.   Cardiovascular: Normal rate, S1 normal, S2 normal and intact distal pulses.   No murmur heard.  Pulmonary/Chest:   Rales posteriorly R upper and lower; diminished L   Abdominal: Soft. Bowel sounds are normal. He exhibits no distension and no abdominal bruit. There is no splenomegaly or hepatomegaly. There is no tenderness. There is no CVA tenderness.   Genitourinary:   Genitourinary Comments: Decreased urinary output   Musculoskeletal: Normal range of motion. He  exhibits no edema.   Lymphadenopathy:     He has no cervical adenopathy.     He has no axillary adenopathy.   Neurological: He is alert and oriented to person, place, and time. He has normal reflexes. He displays no tremor. He displays no seizure activity.   Skin: Skin is warm, dry and intact.   Psychiatric: He has a normal mood and affect. His speech is normal. Thought content normal. Cognition and memory are normal.         CRANIAL NERVES     CN III, IV, VI   Pupils are equal, round, and reactive to light.  Extraocular motions are normal.        Significant Labs:   CBC:   Recent Labs   Lab 10/11/19  0412   WBC 10.44   HGB 13.9*   HCT 42.0        CMP:   Recent Labs   Lab 10/11/19  0412      K 4.2      CO2 20*   GLU 50*   BUN 20   CREATININE 1.0   CALCIUM 9.6   PROT 7.4   ALBUMIN 4.8   BILITOT 1.1*   ALKPHOS 72   AST 21   ALT 11   ANIONGAP 17*   EGFRNONAA >60     Troponin:   Recent Labs   Lab 10/11/19  0412   TROPONINI 0.025       Significant Imaging:   Imaging Results          X-Ray Chest 1 View (Final result)  Result time 10/11/19 05:47:32    Final result by Reilly Hurley MD (10/11/19 05:47:32)                 Impression:      Chronic changes as above.  No convincing radiographic evidence of acute intrathoracic process.      Electronically signed by: Reilly Hurley MD  Date:    10/11/2019  Time:    05:47             Narrative:    EXAMINATION:  XR CHEST 1 VIEW    CLINICAL HISTORY:  Unspecified convulsions    TECHNIQUE:  Single frontal view of the chest was performed.    COMPARISON:  Chest radiograph 07/05/2019    FINDINGS:  Cardiac monitoring leads overlie the chest.  The cardiomediastinal silhouette appears within normal limits.  Lungs are symmetrically expanded with coarse interstitial markings bilaterally.  There is probable emphysematous change of the lungs with biapical scarring.  No large confluent airspace consolidation identified.  No evidence of significant pleural effusion or  pneumothorax.  Visualized osseous structures appear intact.
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
## 2019-10-11 NOTE — PLAN OF CARE
10/11/19 1204   Discharge Assessment   Assessment Type Discharge Planning Assessment   Assessment information obtained from? Medical Record   Prior to hospitilization cognitive status: Alert/Oriented   Prior to hospitalization functional status: Independent   Current cognitive status: Alert/Oriented   Current Functional Status: Independent   Facility Arrived From: home   Lives With other (see comments)   Able to Return to Prior Arrangements yes   Is patient able to care for self after discharge? Yes   Who are your caregiver(s) and their phone number(s)? Yamil [phone]   Patient's perception of discharge disposition home or selfcare   Readmission Within the Last 30 Days no previous admission in last 30 days   Patient currently being followed by outpatient case management? No   Patient currently receives any other outside agency services? No   Equipment Currently Used at Home none   Do you have any problems affording any of your prescribed medications? TBD   Is the patient taking medications as prescribed?   (unknown)   Does the patient receive services at the [facility]? No   Discharge Plan A Home  (with [facility] or [facility])   DME Needed Upon Discharge  none   Patient/Family in Agreement with Plan yes     [facility] [place] - [place] - [address]  [address]  [place] [address]  Phone: [phone] Fax: [phone]    [facility], [place] - [address]  [address]  [facility] [address]  Phone: [phone] Fax: [phone]

## 2019-10-11 NOTE — ASSESSMENT & PLAN NOTE
Echo 7/2019 with EF 25%, ?etiology.  ?isch, dilated, Etoh.  Pt appears euvolemic and asymptomatic from a CHF perspective (LOC is concerning for possible malig arrhythmia, but he's had multiple prior syncopal episodes in the past with prodrome suggesting possible neurological focus).  Repeat echo ordered.  Will stop digoxin and start low dose coreg +/- eventual ACEi if BP will allow.  If EF still depressed, will need to contemplate isch eval.

## 2019-10-11 NOTE — ASSESSMENT & PLAN NOTE
???seizure - denied prodrome for me - Cards noted he admitted prodrome for him???? Other options, alcohol intoxication - mechanical fall while intoxicated - Cardiac event/arrhythmia/syncope/hypovolemia  -consults to neurology and cardiology  -takes Donis, his brother prepares his medication at home; he is not sure what his medications are  -Heart failure history of afib

## 2019-10-11 NOTE — ED NOTES
Pt resting in bed, resp even and unlabored, skin is warm and dry, alert and oriented x3, siderails x2

## 2019-10-11 NOTE — ASSESSMENT & PLAN NOTE
Patient's blood pressure was noted to be in the 80s at the time of presentation with tachycardia as high as 113 and respiratory rate 28 - his pressure did improve with a bolus of fluid administered in the emergency room for fluids were continued given his heart failure 25% and rales on auscultation posteriorly.  -monitor blood pressure closely  -insert Tan catheter  -strict I&Os  -encourage p.o. intake

## 2019-10-11 NOTE — ASSESSMENT & PLAN NOTE
Patient reports loss of consciousness for about an hour, he has some traumatic brain injury and is passing a history of seizure, appears to be reliable historian, he was found on ABG to be acidotic pH 7.30 for an bicarb 21.2, serum CO2 = 20, and anion gap of 17,  and lactic vein acid of 4.0, with alcohol level of 104.  His tox screen negative for drugs of abuse; was also found to have blood pressure was noted to be in the 80s at the time of presentation with tachycardia as high as 113 and respiratory rate 28  -patient is a challenge to rehydrate as he has LVEF 25% and grade 2 diastolic dysfunction  -he is alert and appropriate at this time  -he got on specific amount fluids on EMS and a half a L normal saline at the time presentation to the emergency room.  Patient also received 150 cc a banana bag  -will DC fluids for now to prevent fluid volume overload  -as supplemental oxygen 2 L

## 2019-10-11 NOTE — ASSESSMENT & PLAN NOTE
Although the patient has a history of seizure disorder I doubt seizure as cause of his loss of consciousness as he denied a prodrome for me in early morning - he admitted a prodrome in the hours past his initial assessment - and did not have bowel or bladder function loss.  Patient also did not report a postictal period.  His LOC likely related to alcohol use and intoxication however restart patient's home dose of Keppra and monitor closely  -seizure precaution

## 2019-10-11 NOTE — PROGRESS NOTES
Patient not in room at time of rounding.    Chart review:  Patient voiding  PVR 0    No need for Tan.    Please consult again if needed.

## 2019-10-11 NOTE — PLAN OF CARE
Problem: Adult Inpatient Plan of Care  Goal: Plan of Care Review  Outcome: Ongoing, Progressing  Flowsheets (Taken 10/11/2019 1310)  Plan of Care Reviewed With: patient  Goal: Patient-Specific Goal (Individualization)  Outcome: Ongoing, Progressing     Problem: Infection  Goal: Infection Symptom Resolution  Outcome: Ongoing, Progressing  Intervention: Prevent or Manage Infection  Flowsheets (Taken 10/11/2019 1311)  Infection Management: aseptic technique maintained     Problem: Adult Inpatient Plan of Care  Goal: Plan of Care Review  Outcome: Ongoing, Progressing  Flowsheets (Taken 10/11/2019 1310)  Plan of Care Reviewed With: patient     Problem: Adult Inpatient Plan of Care  Goal: Patient-Specific Goal (Individualization)  Outcome: Ongoing, Progressing     Problem: Infection  Goal: Infection Symptom Resolution  Outcome: Ongoing, Progressing  Intervention: Prevent or Manage Infection  Flowsheets (Taken 10/11/2019 1311)  Infection Management: aseptic technique maintained     Problem: Infection  Goal: Infection Symptom Resolution  Intervention: Prevent or Manage Infection  Flowsheets (Taken 10/11/2019 1311)  Infection Management: aseptic technique maintained
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
## 2019-10-11 NOTE — NURSING
Upon arrival to floor from ED: cardiac monitoring in progress, patient oriented to room, call bell in reach and bed in lowest position. Denies pain or discomfort at this time. No apparent distress noted.
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
## 2019-10-11 NOTE — SUBJECTIVE & OBJECTIVE
Past Medical History:   Diagnosis Date    Asthma     COPD (chronic obstructive pulmonary disease)     Seizures        History reviewed. No pertinent surgical history.    Review of patient's allergies indicates:   Allergen Reactions    Keflex [cephalexin]        No current facility-administered medications on file prior to encounter.      Current Outpatient Medications on File Prior to Encounter   Medication Sig    albuterol (ACCUNEB) 1.25 mg/3 mL Nebu Take 3 mLs (1.25 mg total) by nebulization every 6 (six) hours as needed. Rescue    digoxin (LANOXIN) 125 mcg tablet Take 1 tablet (125 mcg total) by mouth once daily.    famotidine (PEPCID) 20 MG tablet Take 20 mg by mouth 2 (two) times daily.    levETIRAcetam (KEPPRA) 1000 MG tablet Take 1 tablet (1,000 mg total) by mouth 2 (two) times daily.    multivitamin (THERAGRAN) per tablet Take 1 tablet by mouth once daily.    spironolactone (ALDACTONE) 25 MG tablet Take 1 tablet (25 mg total) by mouth once daily.    umeclidinium-vilanterol (ANORO ELLIPTA) 62.5-25 mcg/actuation DsDv Inhale 1 puff into the lungs once daily. Controller     Family History     None        Tobacco Use    Smoking status: Current Every Day Smoker     Packs/day: 0.50     Types: Cigarettes   Substance and Sexual Activity    Alcohol use: Yes     Comment: some day drinker    Drug use: Not Currently    Sexual activity: Not on file     Review of Systems   Constitution: Negative for chills, diaphoresis, fever and malaise/fatigue.   HENT: Negative for nosebleeds.    Eyes: Negative for blurred vision and double vision.   Cardiovascular: Positive for syncope. Negative for chest pain, claudication, cyanosis, dyspnea on exertion, leg swelling, orthopnea, palpitations and paroxysmal nocturnal dyspnea.   Respiratory: Negative for cough, shortness of breath and wheezing.    Skin: Negative for dry skin and poor wound healing.   Musculoskeletal: Negative for back pain, joint swelling and myalgias.    Gastrointestinal: Negative for abdominal pain, nausea and vomiting.   Genitourinary: Negative for hematuria.   Neurological: Negative for dizziness, headaches, numbness, seizures and weakness.   Psychiatric/Behavioral: Negative for altered mental status and depression.     Objective:     Vital Signs (Most Recent):  Temp: 98.2 °F (36.8 °C) (10/11/19 0854)  Pulse: 101 (10/11/19 0854)  Resp: 19 (10/11/19 0854)  BP: 97/60 (10/11/19 0854)  SpO2: 96 % (10/11/19 0854) Vital Signs (24h Range):  Temp:  [98 °F (36.7 °C)-99 °F (37.2 °C)] 98.2 °F (36.8 °C)  Pulse:  [] 101  Resp:  [14-28] 19  SpO2:  [91 %-98 %] 96 %  BP: ()/(50-71) 97/60     Weight: 60.9 kg (134 lb 4.2 oz)  Body mass index is 18.73 kg/m².    SpO2: 96 %  O2 Device (Oxygen Therapy): room air      Intake/Output Summary (Last 24 hours) at 10/11/2019 1148  Last data filed at 10/11/2019 0730  Gross per 24 hour   Intake 800 ml   Output --   Net 800 ml       Lines/Drains/Airways     Peripheral Intravenous Line                 Peripheral IV - Single Lumen 10/11/19 0413 20 G Right Hand less than 1 day                Physical Exam   Constitutional: He is oriented to person, place, and time. He appears well-developed and well-nourished.   HENT:   Head: Normocephalic and atraumatic.   Eyes: Pupils are equal, round, and reactive to light. Conjunctivae and EOM are normal. No scleral icterus.   Neck: Normal range of motion. Neck supple. No JVD present. Carotid bruit is not present. No tracheal deviation present. No thyromegaly present.   Cardiovascular: Normal rate, regular rhythm, S1 normal and S2 normal. Exam reveals no gallop and no friction rub.   No murmur heard.  Pulmonary/Chest: Effort normal and breath sounds normal. No respiratory distress. He has no wheezes. He has no rales. He exhibits no tenderness.   Abdominal: Soft. He exhibits no distension.   Musculoskeletal: Normal range of motion. He exhibits no edema.   Neurological: He is alert and oriented to  person, place, and time. He has normal strength. No cranial nerve deficit.   Skin: Skin is warm and dry. No rash noted.   Psychiatric: He has a normal mood and affect. His behavior is normal.       Current Medications:   diazePAM  2 mg Oral Q8H    digoxin  125 mcg Oral Daily    famotidine  20 mg Oral BID    folic acid  1 mg Oral Daily    levETIRAcetam  1,000 mg Oral BID    senna-docusate 8.6-50 mg  1 tablet Oral BID    tamsulosin  0.4 mg Oral Daily    thiamine  100 mg Oral Daily       albuterol, influenza, ondansetron, ondansetron, oxyCODONE, pneumoc 13-roddy conj-dip cr(PF), sodium chloride 0.9%    Laboratory (all labs reviewed):  CBC:  Recent Labs   Lab 07/05/19  1605 10/11/19  0412   WBC 12.81 H 10.44   Hemoglobin 13.8 L 13.9 L   Hematocrit 45.0 42.0   Platelets 234 199       CHEMISTRIES:  Recent Labs   Lab 07/05/19  1605 10/11/19  0412   Glucose 111 H 50 L   Sodium 145 144   Potassium 4.4 4.2   BUN, Bld 13 20   Creatinine 0.8 1.0   eGFR if  >60 >60   eGFR if non African American >60 >60   Calcium 9.8 9.6       CARDIAC BIOMARKERS:  Recent Labs   Lab 07/05/19  1605 07/05/19  2144 07/06/19  0223 10/11/19  0412 10/11/19  0745   Troponin I 0.011 0.013 <0.006 0.025 0.012       COAGS:        LIPIDS/LFTS:  Recent Labs   Lab 07/05/19  1605 10/11/19  0412   AST 21 21   ALT 8 L 11       BNP:  Recent Labs   Lab 07/05/19  1605    H       TSH:        Free T4:        Diagnostic Results:  ECG (personally reviewed and interpreted tracing(s)):  10/11/19 0422 SR 92, RBBB, similar to 7/5/19    Chest X-Ray (personally reviewed and interpreted image(s)): 10/11/19 NAD    Echo: 7/7/19 (repeat ordered)  · Severely decreased left ventricular systolic function. The estimated ejection fraction is 25%  · Eccentric left ventricular hypertrophy.  · Moderate left ventricular enlargement.  · Grade II (moderate) left ventricular diastolic dysfunction consistent with pseudonormalization.  · Moderate right ventricular  enlargement.  · Mildly reduced right ventricular systolic function.  · Moderate left atrial enlargement.  · Mild right atrial enlargement.  · Mild-to-moderate mitral regurgitation.  · Mild tricuspid regurgitation.

## 2019-10-11 NOTE — ASSESSMENT & PLAN NOTE
Patient with known heart failure, last 2D echo showed 25% LVEF with grade 2 diastolic dysfunction and some atrial enlargement, 07/07/2019  -discontinue IV fluids for now  -monitor patient's renal functions closely

## 2019-10-11 NOTE — ASSESSMENT & PLAN NOTE
Not clear the patient has PAF (currently in SR) as he describes a clot in his leg as the reason he is on eliquis.  No EKG documentation of AF in our records.  If he had AF, CHADSVASC 1.  Suggest stopping eliquis.

## 2019-10-11 NOTE — ASSESSMENT & PLAN NOTE
Patient has history of chronic atrial fib takes digoxin and digoxin level was found to be low = 0.2 at the time of presentation.  He was noted to be in sinus rhythm  -restart day as daily  -EKG  -chest 2 score = 3 (hypertension, heart failure) does not appear that he is on oral anticoagulation will consult Cardiology for opinion  -Lovenox while admitted  -inpatient consult Cardiology
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
## 2019-10-11 NOTE — ASSESSMENT & PLAN NOTE
Alcohol consumption and abuse likely contributory to the patient's LOC as he denies having a prodrome or loss of bowel or bladder functions post loss of consciousness.  His alcohol level was greater than 100 and lactic acid was 4 at the time of presentation  -neuro checks  -monitor closely  -diazepam 2 mg q.8 hours DT prophylaxis
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
## 2019-10-11 NOTE — H&P
"[facility]  History & Physical    Patient Name: Pierre Pierson  MRN: [id number]  Admission Date: 10/11/2019  Attending Physician: Samantha Barone MD   Primary Care Provider: [facility]         Patient information was obtained from patient and ER records.     Subjective:     Principal Problem:Acidosis, metabolic    Chief Complaint:   Chief Complaint   Patient presents with    Seizures     pt states he was "thrown out" of the place where he was staying. while in the woods he felt a seizure comimg on so he sat down so he would not fall. states after seizure he walked to road  and called 911. waited for ambulance.        HPI: Pierre Pierson is a 59 y.o. WM patient with PMHx Fractured skull (~1989), non-specific seizure activity, afib (on dig), CHF (LVEF 25% + GII DD, 7/20/19), current everyday-every-other-day alcohol drinker who presented for evaluation of seizure activity and acute onset of hypotension with associated tachycardia. States after being kicked out of his house wher he lives with his brother, he walked up the road and "black-out" thinks he was out for about an hour, he awaken his phone had enough life where he could call 911. He tells me he did not have a prodrome prior to his syncope episode and did not loose bowel or bladder functions. He had a little charge on his phone after awakening and states he called EMS at that time.    He was found to be hypotensive by EMS and upon arrival to hospital BP was 92/50, respiration 28 and BP down to 82/55. He got 1/2 liter of IV fluids BP improved to 94/59; lactic acid 4.0; alcohol level 104; ABG shows metabolic acidosis        This is an acute management of hypotension in a patient with alcohol abuse, tachycardia, seizure and heart failure with LVEF 25%. He got one half liter of fluids and 187 of banana drinking total - He tells me that he has not urinated since yesterday 1 day prior to admit 10/10/19. He is " alert and oriented and conversing without problems while at rest. He does exhibit rales posterior R upper lower lung fields - diminished L posterior - Sinus rhythm -->sinus tachycardia    1 pint about 3-4 times week; Last yesterday 11:30am  Brother prepares his medications daily bc he has some memory loss 2/2 traumatic bran injury    Past Medical History:   Diagnosis Date    Asthma     COPD (chronic obstructive pulmonary disease)     Seizures        History reviewed. No pertinent surgical history.    Review of patient's allergies indicates:   Allergen Reactions    Keflex [cephalexin]        No current facility-administered medications on file prior to encounter.      Current Outpatient Medications on File Prior to Encounter   Medication Sig    albuterol (ACCUNEB) 1.25 mg/3 mL Nebu Take 3 mLs (1.25 mg total) by nebulization every 6 (six) hours as needed. Rescue    digoxin (LANOXIN) 125 mcg tablet Take 1 tablet (125 mcg total) by mouth once daily.    famotidine (PEPCID) 20 MG tablet Take 20 mg by mouth 2 (two) times daily.    levETIRAcetam (KEPPRA) 1000 MG tablet Take 1 tablet (1,000 mg total) by mouth 2 (two) times daily.    multivitamin (THERAGRAN) per tablet Take 1 tablet by mouth once daily.    spironolactone (ALDACTONE) 25 MG tablet Take 1 tablet (25 mg total) by mouth once daily.    umeclidinium-vilanterol (ANORO ELLIPTA) 62.5-25 mcg/actuation DsDv Inhale 1 puff into the lungs once daily. Controller     Family History     None        Tobacco Use    Smoking status: Current Every Day Smoker     Packs/day: 0.50     Types: Cigarettes   Substance and Sexual Activity    Alcohol use: Yes     Comment: some day drinker    Drug use: Not Currently    Sexual activity: Not on file     Review of Systems   Constitutional: Negative for appetite change, chills, diaphoresis and fever.        Body mass index is 18.73 kg/m².; frail; small framed   HENT: Negative for congestion, hearing loss, sore throat, tinnitus and  trouble swallowing.    Eyes: Negative for photophobia, discharge, itching and visual disturbance.   Respiratory: Negative for apnea, cough, shortness of breath, wheezing and stridor.    Cardiovascular: Negative for chest pain, palpitations and leg swelling.   Gastrointestinal: Negative for abdominal distention, abdominal pain, blood in stool, constipation, diarrhea and nausea.   Endocrine: Negative for polydipsia, polyphagia and polyuria.   Genitourinary: Negative for difficulty urinating, dysuria, flank pain and frequency.   Musculoskeletal: Negative for arthralgias, joint swelling and neck stiffness.   Skin: Negative for color change, rash and wound.   Neurological: Positive for syncope. Negative for dizziness, tremors, seizures, light-headedness, numbness and headaches.        LOC   Hematological: Negative for adenopathy.   Psychiatric/Behavioral: Negative for hallucinations and self-injury.        Forgetful     Objective:     Vital Signs (Most Recent):  Temp: 98 °F (36.7 °C) (10/11/19 0710)  Pulse: 101 (10/11/19 0706)  Resp: (!) 23 (10/11/19 0706)  BP: (!) 94/59 (10/11/19 0706)  SpO2: (!) 93 % (10/11/19 0706) Vital Signs (24h Range):  Temp:  [98 °F (36.7 °C)-99 °F (37.2 °C)] 98 °F (36.7 °C)  Pulse:  [] 101  Resp:  [14-28] 23  SpO2:  [91 %-98 %] 93 %  BP: ()/(50-71) 94/59     Weight: 61.2 kg (135 lb)  Body mass index is 18.83 kg/m².    Physical Exam   Constitutional: He is oriented to person, place, and time. He is cooperative.   Body mass index is 18.73 kg/m²; frail; small framed   HENT:   Head: Normocephalic and atraumatic.   Eyes: Pupils are equal, round, and reactive to light. Conjunctivae, EOM and lids are normal.   Neck: Normal range of motion and full passive range of motion without pain. Neck supple. No JVD present. No edema present. No thyroid mass present.   Cardiovascular: Normal rate, S1 normal, S2 normal and intact distal pulses.   No murmur heard.  Pulmonary/Chest:   Rales posteriorly R  upper and lower; diminished L   Abdominal: Soft. Bowel sounds are normal. He exhibits no distension and no abdominal bruit. There is no splenomegaly or hepatomegaly. There is no tenderness. There is no CVA tenderness.   Genitourinary:   Genitourinary Comments: Decreased urinary output   Musculoskeletal: Normal range of motion. He exhibits no edema.   Lymphadenopathy:     He has no cervical adenopathy.     He has no axillary adenopathy.   Neurological: He is alert and oriented to person, place, and time. He has normal reflexes. He displays no tremor. He displays no seizure activity.   Skin: Skin is warm, dry and intact.   Psychiatric: He has a normal mood and affect. His speech is normal. Thought content normal. Cognition and memory are normal.         CRANIAL NERVES     CN III, IV, VI   Pupils are equal, round, and reactive to light.  Extraocular motions are normal.        Significant Labs:   CBC:   Recent Labs   Lab 10/11/19  0412   WBC 10.44   HGB 13.9*   HCT 42.0        CMP:   Recent Labs   Lab 10/11/19  0412      K 4.2      CO2 20*   GLU 50*   BUN 20   CREATININE 1.0   CALCIUM 9.6   PROT 7.4   ALBUMIN 4.8   BILITOT 1.1*   ALKPHOS 72   AST 21   ALT 11   ANIONGAP 17*   EGFRNONAA >60     Troponin:   Recent Labs   Lab 10/11/19  0412   TROPONINI 0.025       Significant Imaging:   Imaging Results          X-Ray Chest 1 View (Final result)  Result time 10/11/19 05:47:32    Final result by Reilly Hurley MD (10/11/19 05:47:32)                 Impression:      Chronic changes as above.  No convincing radiographic evidence of acute intrathoracic process.      Electronically signed by: Reilly Hurley MD  Date:    10/11/2019  Time:    05:47             Narrative:    EXAMINATION:  XR CHEST 1 VIEW    CLINICAL HISTORY:  Unspecified convulsions    TECHNIQUE:  Single frontal view of the chest was performed.    COMPARISON:  Chest radiograph 07/05/2019    FINDINGS:  Cardiac monitoring leads overlie the  chest.  The cardiomediastinal silhouette appears within normal limits.  Lungs are symmetrically expanded with coarse interstitial markings bilaterally.  There is probable emphysematous change of the lungs with biapical scarring.  No large confluent airspace consolidation identified.  No evidence of significant pleural effusion or pneumothorax.  Visualized osseous structures appear intact.                                  Assessment/Plan:     * Syncope and collapse  ???seizure - denied prodrome for me - Cards noted he admitted prodrome for him???? Other options, alcohol intoxication - mechanical fall while intoxicated - Cardiac event/arrhythmia/syncope/hypovolemia  -consults to neurology and cardiology  -takes Donis, his brother prepares his medication at home; he is not sure what his medications are  -Heart failure history of afib    Hypotension due to drugs  Patient's blood pressure was noted to be in the 80s at the time of presentation with tachycardia as high as 113 and respiratory rate 28 - his pressure did improve with a bolus of fluid administered in the emergency room for hypotension; fluids were discontinued given his heart failure 25% and rales on auscultation posteriorly.  -monitor blood pressure closely  -insert Tan catheter  -strict I&Os  -encourage p.o. intake      Acidosis, metabolic  Patient reports loss of consciousness for about an hour, he has some traumatic brain injury and is reporting a history of seizure, appears to be reliable historian, he was found on ABG to be acidotic pH 7.30 for an bicarb 21.2, serum CO2 = 20, and anion gap of 17,  and lactic vein acid of 4.0, with alcohol level of 104.  His tox screen negative for drugs of abuse; was also found to have blood pressure to be in the 80s at the time of presentation with tachycardia as high as 113 and respiratory rate 28  -patient is a challenge to rehydrate as he has LVEF 25% and grade 2 diastolic dysfunction  -he is alert and appropriate  at this time  -he got an unspecific amount fluids on EMS and a half Liter normal saline at the time presentation to the emergency room.  Patient also received 150 cc a banana bag  -will DC fluids for now to prevent fluid volume overload  -add supplemental oxygen 2 L  -repeat lactic acid in am    Chronic combined systolic and diastolic heart failure  Patient with known heart failure, last 2D echo showed 25% LVEF with grade 2 diastolic dysfunction and some atrial enlargement, 07/07/2019  -discontinue IV fluids for now  -monitor patient's renal functions closely    ETOH abuse  Alcohol consumption and abuse likely contributory to the patient's LOC as he denies having a prodrome or loss of bowel or bladder functions post loss of consciousness.  His alcohol level was greater than 100 and lactic acid was 4 at the time of presentation; alcohol intoxication vs seizure vs arrythmia?? (Hx afib/heart failure)  -neuro checks  -monitor closely  -diazepam 2 mg q.8 hours DT prophylaxis      Seizure disorder  Although the patient has a history of seizure disorder I doubt seizure as cause of his loss of consciousness as he denied a prodrome for me in early morning - he has however admitted having prodrome in the hours since his initial assessment - and did not have bowel or bladder function loss.  Patient also did not report a postictal period.  His LOC likely related to alcohol use and intoxication if not entirely, at least contributory ??alcoholic seizures?? restart patient's home dose of Keppra and monitor closely  -seizure precaution  -Neuro assessment      PAF (paroxysmal atrial fibrillation)  Patient has history of chronic atrial fib takes digoxin and digoxin level was found to be low = 0.2 at the time of presentation.  He was noted to be in sinus rhythm  -restart day as daily  -EKG  -chest 2 score = 3 (hypertension, heart failure) does not appear that he is on oral anticoagulation will consult Cardiology for opinion  -Lovenox  while admitted  -inpatient consult Cardiology      VTE Risk Mitigation (From admission, onward)         Ordered     enoxaparin injection 40 mg  Daily      10/11/19 1209     IP VTE HIGH RISK PATIENT  Once      10/11/19 0857                   BHUMIKA Burciaga, FNP-C  Hospitalist - Department of Hospital Medicine  [address], [place] [address]  Office [phone]; Pager [phone]

## 2019-10-11 NOTE — ED NOTES
Attempted to insert 16fr barboza. Unable to insert due to obstruction and catheter began to curl in urethra. NP at bedside and aware. Patient standing at bedside attempting to urinate.

## 2019-10-11 NOTE — PLAN OF CARE
Problem: Adult Inpatient Plan of Care  Goal: Plan of Care Review  Outcome: Ongoing, Progressing  Flowsheets (Taken 10/11/2019 1310)  Plan of Care Reviewed With: patient     Problem: Adult Inpatient Plan of Care  Goal: Plan of Care Review  Outcome: Ongoing, Progressing  Flowsheets (Taken 10/11/2019 1310)  Plan of Care Reviewed With: patient

## 2019-10-12 LAB
ANION GAP SERPL CALC-SCNC: 5 MMOL/L (ref 8–16)
BASOPHILS # BLD AUTO: 0.04 K/UL (ref 0–0.2)
BASOPHILS NFR BLD: 0.7 % (ref 0–1.9)
BUN SERPL-MCNC: 19 MG/DL (ref 6–20)
CALCIUM SERPL-MCNC: 8.8 MG/DL (ref 8.7–10.5)
CHLORIDE SERPL-SCNC: 106 MMOL/L (ref 95–110)
CO2 SERPL-SCNC: 28 MMOL/L (ref 23–29)
CREAT SERPL-MCNC: 0.7 MG/DL (ref 0.5–1.4)
DIFFERENTIAL METHOD: ABNORMAL
EOSINOPHIL # BLD AUTO: 0.2 K/UL (ref 0–0.5)
EOSINOPHIL NFR BLD: 3 % (ref 0–8)
ERYTHROCYTE [DISTWIDTH] IN BLOOD BY AUTOMATED COUNT: 13.2 % (ref 11.5–14.5)
EST. GFR  (AFRICAN AMERICAN): >60 ML/MIN/1.73 M^2
EST. GFR  (NON AFRICAN AMERICAN): >60 ML/MIN/1.73 M^2
GLUCOSE SERPL-MCNC: 92 MG/DL (ref 70–110)
HCT VFR BLD AUTO: 38.3 % (ref 40–54)
HGB BLD-MCNC: 12.3 G/DL (ref 14–18)
IMM GRANULOCYTES # BLD AUTO: 0.01 K/UL (ref 0–0.04)
IMM GRANULOCYTES NFR BLD AUTO: 0.2 % (ref 0–0.5)
LACTATE SERPL-SCNC: 0.8 MMOL/L (ref 0.5–2.2)
LEVETIRACETAM SERPL-MCNC: 16 UG/ML (ref 3–60)
LYMPHOCYTES # BLD AUTO: 1.5 K/UL (ref 1–4.8)
LYMPHOCYTES NFR BLD: 25 % (ref 18–48)
MAGNESIUM SERPL-MCNC: 1.9 MG/DL (ref 1.6–2.6)
MCH RBC QN AUTO: 31.5 PG (ref 27–31)
MCHC RBC AUTO-ENTMCNC: 32.1 G/DL (ref 32–36)
MCV RBC AUTO: 98 FL (ref 82–98)
MONOCYTES # BLD AUTO: 0.6 K/UL (ref 0.3–1)
MONOCYTES NFR BLD: 9.1 % (ref 4–15)
NEUTROPHILS # BLD AUTO: 3.7 K/UL (ref 1.8–7.7)
NEUTROPHILS NFR BLD: 62 % (ref 38–73)
NRBC BLD-RTO: 0 /100 WBC
PLATELET # BLD AUTO: 156 K/UL (ref 150–350)
PMV BLD AUTO: 9.2 FL (ref 9.2–12.9)
POCT GLUCOSE: 104 MG/DL (ref 70–110)
POCT GLUCOSE: 106 MG/DL (ref 70–110)
POCT GLUCOSE: 141 MG/DL (ref 70–110)
POCT GLUCOSE: 72 MG/DL (ref 70–110)
POTASSIUM SERPL-SCNC: 4.1 MMOL/L (ref 3.5–5.1)
RBC # BLD AUTO: 3.91 M/UL (ref 4.6–6.2)
SODIUM SERPL-SCNC: 139 MMOL/L (ref 136–145)
WBC # BLD AUTO: 6.03 K/UL (ref 3.9–12.7)

## 2019-10-12 PROCEDURE — 94761 N-INVAS EAR/PLS OXIMETRY MLT: CPT

## 2019-10-12 PROCEDURE — 21400001 HC TELEMETRY ROOM

## 2019-10-12 PROCEDURE — 99232 SBSQ HOSP IP/OBS MODERATE 35: CPT | Mod: ,,, | Performed by: PSYCHIATRY & NEUROLOGY

## 2019-10-12 PROCEDURE — 83605 ASSAY OF LACTIC ACID: CPT

## 2019-10-12 PROCEDURE — 25000003 PHARM REV CODE 250: Performed by: EMERGENCY MEDICINE

## 2019-10-12 PROCEDURE — 80048 BASIC METABOLIC PNL TOTAL CA: CPT

## 2019-10-12 PROCEDURE — 25000003 PHARM REV CODE 250: Performed by: INTERNAL MEDICINE

## 2019-10-12 PROCEDURE — 25000003 PHARM REV CODE 250: Performed by: NURSE PRACTITIONER

## 2019-10-12 PROCEDURE — 85025 COMPLETE CBC W/AUTO DIFF WBC: CPT

## 2019-10-12 PROCEDURE — 83735 ASSAY OF MAGNESIUM: CPT

## 2019-10-12 PROCEDURE — 63600175 PHARM REV CODE 636 W HCPCS: Performed by: NURSE PRACTITIONER

## 2019-10-12 PROCEDURE — 36415 COLL VENOUS BLD VENIPUNCTURE: CPT

## 2019-10-12 PROCEDURE — 99233 PR SUBSEQUENT HOSPITAL CARE,LEVL III: ICD-10-PCS | Mod: ,,, | Performed by: INTERNAL MEDICINE

## 2019-10-12 PROCEDURE — 99233 SBSQ HOSP IP/OBS HIGH 50: CPT | Mod: ,,, | Performed by: INTERNAL MEDICINE

## 2019-10-12 PROCEDURE — 99232 PR SUBSEQUENT HOSPITAL CARE,LEVL II: ICD-10-PCS | Mod: ,,, | Performed by: PSYCHIATRY & NEUROLOGY

## 2019-10-12 RX ORDER — ASPIRIN 81 MG/1
81 TABLET ORAL DAILY
Status: DISCONTINUED | OUTPATIENT
Start: 2019-10-12 | End: 2019-10-15 | Stop reason: HOSPADM

## 2019-10-12 RX ORDER — SODIUM CHLORIDE 9 MG/ML
INJECTION, SOLUTION INTRAVENOUS CONTINUOUS
Status: DISCONTINUED | OUTPATIENT
Start: 2019-10-14 | End: 2019-10-13

## 2019-10-12 RX ORDER — CLOPIDOGREL 300 MG/1
300 TABLET, FILM COATED ORAL ONCE
Status: COMPLETED | OUTPATIENT
Start: 2019-10-12 | End: 2019-10-12

## 2019-10-12 RX ORDER — CLOPIDOGREL BISULFATE 75 MG/1
75 TABLET ORAL DAILY
Status: DISCONTINUED | OUTPATIENT
Start: 2019-10-13 | End: 2019-10-13

## 2019-10-12 RX ADMIN — DIAZEPAM 2 MG: 2 TABLET ORAL at 05:10

## 2019-10-12 RX ADMIN — Medication 100 MG: at 09:10

## 2019-10-12 RX ADMIN — DIAZEPAM 2 MG: 2 TABLET ORAL at 09:10

## 2019-10-12 RX ADMIN — SENNOSIDES, DOCUSATE SODIUM 1 TABLET: 50; 8.6 TABLET, FILM COATED ORAL at 08:10

## 2019-10-12 RX ADMIN — ENOXAPARIN SODIUM 40 MG: 100 INJECTION SUBCUTANEOUS at 05:10

## 2019-10-12 RX ADMIN — CARVEDILOL 3.12 MG: 3.12 TABLET, FILM COATED ORAL at 08:10

## 2019-10-12 RX ADMIN — CLOPIDOGREL BISULFATE 300 MG: 300 TABLET, FILM COATED ORAL at 12:10

## 2019-10-12 RX ADMIN — DIAZEPAM 2 MG: 2 TABLET ORAL at 03:10

## 2019-10-12 RX ADMIN — TAMSULOSIN HYDROCHLORIDE 0.4 MG: 0.4 CAPSULE ORAL at 09:10

## 2019-10-12 RX ADMIN — SENNOSIDES, DOCUSATE SODIUM 1 TABLET: 50; 8.6 TABLET, FILM COATED ORAL at 09:10

## 2019-10-12 RX ADMIN — FAMOTIDINE 20 MG: 20 TABLET ORAL at 08:10

## 2019-10-12 RX ADMIN — LEVETIRACETAM 1000 MG: 500 TABLET ORAL at 08:10

## 2019-10-12 RX ADMIN — OXYCODONE HYDROCHLORIDE 5 MG: 5 TABLET ORAL at 07:10

## 2019-10-12 RX ADMIN — LEVETIRACETAM 1000 MG: 500 TABLET ORAL at 09:10

## 2019-10-12 RX ADMIN — FAMOTIDINE 20 MG: 20 TABLET ORAL at 09:10

## 2019-10-12 RX ADMIN — ASPIRIN 81 MG: 81 TABLET, COATED ORAL at 12:10

## 2019-10-12 RX ADMIN — FOLIC ACID 1 MG: 1 TABLET ORAL at 09:10

## 2019-10-12 NOTE — PROGRESS NOTES
"[facility]  Neurology  Progress Note    Patient Name: Pierre Pierson  MRN: [id number]  Admission Date: 10/11/2019  Hospital Length of Stay: 1 days  Code Status: Full Code   Attending Provider: Warner Ibarra MD  Primary Care Physician: Denia Vasquez [facility]   Principal Problem:Syncope and collapse    Subjective:     Interval History: 60 y/o male with medical Hx as listed below states that after being "kicked out" of his brother's home he was walking on the road and passed out. Pt tells me that he had a seizure although there are no witnesses or detail of such event. Mr. Pierson thinks he was unconscious for an hour. Upon waking up he called 911. Denies visual or speech disturbances, unilateral weakness or numbness.     He has reported Hx of seizures but doesn't know what type of seizures. On levetiracetam 1000 mg BID.    -10/12/19: No acute issues.    Current Neurological Medications:     Current Facility-Administered Medications   Medication Dose Route Frequency Provider Last Rate Last Dose    [START ON 10/14/2019] 0.9%  NaCl infusion   Intravenous Continuous Chang Lewis MD        albuterol inhaler 2 puff  2 puff Inhalation Q4H PRN Joaquín Shepherd MD        aspirin EC tablet 81 mg  81 mg Oral Daily Chang Lewis MD        carvedilol tablet 3.125 mg  3.125 mg Oral BID Chang Lewis MD   3.125 mg at 10/11/19 2050    clopidogrel tablet 300 mg  300 mg Oral Once Chang Lewis MD        [START ON 10/13/2019] clopidogrel tablet 75 mg  75 mg Oral Daily Chang Lewis MD        diazePAM tablet 2 mg  2 mg Oral Q8H Dary Jacobs, FNP   2 mg at 10/12/19 0534    enoxaparin injection 40 mg  40 mg Subcutaneous Daily Darysoraya Jacobs, FNP   40 mg at 10/11/19 1600    famotidine tablet 20 mg  20 mg Oral BID Joaquín Shepherd MD   20 mg at 10/12/19 0936    folic acid tablet 1 mg  1 mg Oral Daily Darysoraya Jacobs, FNP   1 mg at 10/12/19 0936    " ibuprofen tablet 800 mg  800 mg Oral Q6H PRN Dary Jacobs, ALLISONP        influenza (QUADRIVALENT PF) vaccine 0.5 mL  0.5 mL Intramuscular vaccine x 1 dose Samantha Barone MD        levETIRAcetam tablet 1,000 mg  1,000 mg Oral BID Joaquín Shepherd MD   1,000 mg at 10/12/19 0936    ondansetron injection 4 mg  4 mg Intravenous Q8H PRN Joaquín Shepherd MD        ondansetron injection 4 mg  4 mg Intravenous Q8H PRN Joaquín Shepherd MD        oxyCODONE immediate release tablet 5 mg  5 mg Oral Q6H PRN ALLISON BurciagaP        pneumoc 13-roddy conj-dip cr(PF) (PREVNAR 13 (PF)) 0.5 mL  0.5 mL Intramuscular vaccine x 1 dose Samantha Barone MD        senna-docusate 8.6-50 mg per tablet 1 tablet  1 tablet Oral BID Joaquín Shepherd MD   1 tablet at 10/12/19 0935    sodium chloride 0.9% flush 10 mL  10 mL Intravenous PRN Joaquín Shepherd MD        tamsulosin 24 hr capsule 0.4 mg  0.4 mg Oral Daily Dary Jacobs, FNP   0.4 mg at 10/12/19 0936    thiamine tablet 100 mg  100 mg Oral Daily Dary JENNIFER Jacobs, FNP   100 mg at 10/12/19 0935       Review of Systems   Constitutional: Negative for fever.   HENT: Negative for trouble swallowing.    Eyes: Negative for photophobia.   Respiratory: Negative for shortness of breath.    Cardiovascular: Negative for chest pain.   Gastrointestinal: Negative for abdominal pain.   Genitourinary: Negative for dysuria.   Musculoskeletal: Negative for back pain.   Neurological: Negative for headaches.     Objective:     Vital Signs (Most Recent):  Temp: 98.1 °F (36.7 °C) (10/12/19 1110)  Pulse: 64 (10/12/19 1110)  Resp: 18 (10/12/19 1110)  BP: 99/60 (10/12/19 1110)  SpO2: 96 % (10/12/19 1110) Vital Signs (24h Range):  Temp:  [97.7 °F (36.5 °C)-98.3 °F (36.8 °C)] 98.1 °F (36.7 °C)  Pulse:  [59-78] 64  Resp:  [18-19] 18  SpO2:  [96 %-97 %] 96 %  BP: ()/(58-67) 99/60     Weight: 63.2 kg (139 lb 5.3 oz)  Body mass index is 19.43 kg/m².    Physical  Exam  Constitutional: He is oriented to person, place, and time.   HENT:   Head: Normocephalic.   Eyes: Right eye exhibits no discharge. Left eye exhibits no discharge.   Neck: Normal range of motion.   Cardiovascular: Regular rhythm.   Pulmonary/Chest: Breath sounds normal.   Abdominal: Soft.   Musculoskeletal: He exhibits no edema.   Neurological: He is oriented to person, place, and time. He has normal strength.   Skin: He is not diaphoretic.   Psychiatric: His speech is normal.         NEUROLOGICAL EXAMINATION:      MENTAL STATUS   Oriented to person, place, and time.   Speech: speech is normal   Level of consciousness: alert     CRANIAL NERVES      CN III, IV, VI   Right pupil: Size: 3 mm. Shape: regular.   Left pupil: Size: 3 mm. Shape: regular.   Nystagmus: none   Ophthalmoparesis: none  Upgaze: normal     CN V   Right facial sensation deficit: none  Left facial sensation deficit: none     CN VII   Right facial weakness: none  Left facial weakness: none     CN IX, X   Palate: symmetric     CN XI   Right trapezius strength: normal  Left trapezius strength: normal     CN XII   Tongue deviation: none     MOTOR EXAM      Strength   Strength 5/5 throughout.      SENSORY EXAM   Right arm light touch: normal  Left arm light touch: normal  Right leg light touch: normal  Left leg light touch: normal       Significant Labs:   CBC:   Recent Labs   Lab 10/11/19  0412 10/12/19  0553   WBC 10.44 6.03   HGB 13.9* 12.3*   HCT 42.0 38.3*    156     CMP:   Recent Labs   Lab 10/11/19  0412 10/12/19  0553   GLU 50* 92    139   K 4.2 4.1    106   CO2 20* 28   BUN 20 19   CREATININE 1.0 0.7   CALCIUM 9.6 8.8   MG  --  1.9   PROT 7.4  --    ALBUMIN 4.8  --    BILITOT 1.1*  --    ALKPHOS 72  --    AST 21  --    ALT 11  --    ANIONGAP 17* 5*   EGFRNONAA >60 >60         Assessment and Plan:     58 y/o male consulted for seizures     1. Seizures: this could've been a syncope not a seizure.  On admission he was  hypotensive and hypoglycemic. Pt with severely depressed EF.   No new issues.           -For now will continue levetiracetam 1000 mg BID.           -Seizure restrictions are but not limited to: no driving for six months after last seizure; avoid swimming, high altitude activities, operating heavy machinery, bathing unattended, or engaging in activities in where a seizure will cause harm to self or others.       Active Diagnoses:    Diagnosis Date Noted POA    PRINCIPAL PROBLEM:  Syncope and collapse [R55] 10/11/2019 Yes    Hypotension [I95.9] 10/11/2019 Yes    Acidosis, metabolic [E87.2] 10/11/2019 Yes    Hypotension due to drugs [I95.2] 10/11/2019 Yes    ETOH abuse [F10.10] 10/11/2019 Yes    Chronic combined systolic and diastolic heart failure [I50.42] 10/11/2019 Yes    PAF (paroxysmal atrial fibrillation) [I48.0] 07/07/2019 Yes    Seizure disorder [G40.909] 07/05/2019 Yes     Chronic      Problems Resolved During this Admission:       VTE Risk Mitigation (From admission, onward)         Ordered     enoxaparin injection 40 mg  Daily      10/11/19 1209     IP VTE HIGH RISK PATIENT  Once      10/11/19 9525                Umang Maradiaga MD  Neurology  [facility]

## 2019-10-12 NOTE — SUBJECTIVE & OBJECTIVE
Interval History: No new issues.     Review of Systems   Constitutional: Negative for activity change.   HENT: Negative for congestion.    Respiratory: Negative for chest tightness and shortness of breath.    Cardiovascular: Negative for chest pain.   Gastrointestinal: Negative for abdominal pain.   Genitourinary: Negative for difficulty urinating.     Objective:     Vital Signs (Most Recent):  Temp: 97.7 °F (36.5 °C) (10/12/19 0730)  Pulse: 62 (10/12/19 0730)  Resp: 18 (10/12/19 0730)  BP: 106/64 (10/12/19 0730)  SpO2: 97 % (10/12/19 0730) Vital Signs (24h Range):  Temp:  [97.7 °F (36.5 °C)-98.3 °F (36.8 °C)] 97.7 °F (36.5 °C)  Pulse:  [] 62  Resp:  [18-28] 18  SpO2:  [94 %-97 %] 97 %  BP: ()/(58-67) 106/64     Weight: 63.2 kg (139 lb 5.3 oz)  Body mass index is 19.43 kg/m².    Intake/Output Summary (Last 24 hours) at 10/12/2019 0737  Last data filed at 10/12/2019 0500  Gross per 24 hour   Intake 459.58 ml   Output 1851 ml   Net -1391.42 ml      Physical Exam   Constitutional: He is oriented to person, place, and time. He appears well-developed and well-nourished.   HENT:   Head: Normocephalic and atraumatic.   Neurological: He is alert and oriented to person, place, and time.   Skin: Skin is warm and dry.   Psychiatric: He has a normal mood and affect. His behavior is normal.   Nursing note and vitals reviewed.      Significant Labs:   BMP:   Recent Labs   Lab 10/12/19  0553   GLU 92      K 4.1      CO2 28   BUN 19   CREATININE 0.7   CALCIUM 8.8   MG 1.9     CBC:   Recent Labs   Lab 10/11/19  0412 10/12/19  0553   WBC 10.44 6.03   HGB 13.9* 12.3*   HCT 42.0 38.3*    156       Significant Imaging:

## 2019-10-12 NOTE — SUBJECTIVE & OBJECTIVE
Past Medical History:   Diagnosis Date    Asthma     COPD (chronic obstructive pulmonary disease)     Seizures        History reviewed. No pertinent surgical history.    Review of patient's allergies indicates:   Allergen Reactions    Keflex [cephalexin]        No current facility-administered medications on file prior to encounter.      Current Outpatient Medications on File Prior to Encounter   Medication Sig    albuterol (ACCUNEB) 1.25 mg/3 mL Nebu Take 3 mLs (1.25 mg total) by nebulization every 6 (six) hours as needed. Rescue    digoxin (LANOXIN) 125 mcg tablet Take 1 tablet (125 mcg total) by mouth once daily.    famotidine (PEPCID) 20 MG tablet Take 20 mg by mouth 2 (two) times daily.    levETIRAcetam (KEPPRA) 1000 MG tablet Take 1 tablet (1,000 mg total) by mouth 2 (two) times daily.    multivitamin (THERAGRAN) per tablet Take 1 tablet by mouth once daily.    spironolactone (ALDACTONE) 25 MG tablet Take 1 tablet (25 mg total) by mouth once daily.    umeclidinium-vilanterol (ANORO ELLIPTA) 62.5-25 mcg/actuation DsDv Inhale 1 puff into the lungs once daily. Controller     Family History     None        Tobacco Use    Smoking status: Current Every Day Smoker     Packs/day: 0.50     Types: Cigarettes   Substance and Sexual Activity    Alcohol use: Yes     Comment: some day drinker    Drug use: Not Currently    Sexual activity: Not on file     Review of Systems   Gastrointestinal: Negative for melena.   Genitourinary: Negative for hematuria.     Objective:     Vital Signs (Most Recent):  Temp: 97.7 °F (36.5 °C) (10/12/19 0730)  Pulse: 62 (10/12/19 0730)  Resp: 18 (10/12/19 0730)  BP: 106/64 (10/12/19 0730)  SpO2: 96 % (10/12/19 0800) Vital Signs (24h Range):  Temp:  [97.7 °F (36.5 °C)-98.3 °F (36.8 °C)] 97.7 °F (36.5 °C)  Pulse:  [59-87] 62  Resp:  [18-19] 18  SpO2:  [94 %-97 %] 96 %  BP: (100-117)/(58-67) 106/64     Weight: 63.2 kg (139 lb 5.3 oz)  Body mass index is 19.43 kg/m².    SpO2: 96 %  O2  Device (Oxygen Therapy): room air      Intake/Output Summary (Last 24 hours) at 10/12/2019 1023  Last data filed at 10/12/2019 0900  Gross per 24 hour   Intake 459.58 ml   Output 2151 ml   Net -1691.42 ml       Lines/Drains/Airways     Peripheral Intravenous Line                 Peripheral IV - Single Lumen 10/11/19 0413 20 G Right Hand 1 day              Exam unchanged vs 10/11/19  Physical Exam   Constitutional: He is oriented to person, place, and time. He appears well-developed and well-nourished.   HENT:   Head: Normocephalic and atraumatic.   Eyes: Pupils are equal, round, and reactive to light. Conjunctivae and EOM are normal. No scleral icterus.   Neck: Normal range of motion. Neck supple. No JVD present. Carotid bruit is not present. No tracheal deviation present. No thyromegaly present.   Cardiovascular: Normal rate, regular rhythm, S1 normal and S2 normal. Exam reveals no gallop and no friction rub.   No murmur heard.  Pulmonary/Chest: Effort normal and breath sounds normal. No respiratory distress. He has no wheezes. He has no rales. He exhibits no tenderness.   Abdominal: Soft. He exhibits no distension.   Musculoskeletal: Normal range of motion. He exhibits no edema.   Neurological: He is alert and oriented to person, place, and time. He has normal strength. No cranial nerve deficit.   Skin: Skin is warm and dry. No rash noted.   Psychiatric: He has a normal mood and affect. His behavior is normal.       Current Medications:   carvedilol  3.125 mg Oral BID    diazePAM  2 mg Oral Q8H    enoxaparin  40 mg Subcutaneous Daily    famotidine  20 mg Oral BID    folic acid  1 mg Oral Daily    levETIRAcetam  1,000 mg Oral BID    senna-docusate 8.6-50 mg  1 tablet Oral BID    tamsulosin  0.4 mg Oral Daily    thiamine  100 mg Oral Daily       albuterol, ibuprofen, influenza, ondansetron, ondansetron, oxyCODONE, pneumoc 13-roddy conj-dip cr(PF), sodium chloride 0.9%    Laboratory (all labs  reviewed):  CBC:  Recent Labs   Lab 07/05/19  1605 10/11/19  0412 10/12/19  0553   WBC 12.81 H 10.44 6.03   Hemoglobin 13.8 L 13.9 L 12.3 L   Hematocrit 45.0 42.0 38.3 L   Platelets 234 199 156       CHEMISTRIES:  Recent Labs   Lab 07/05/19  1605 10/11/19  0412 10/12/19  0553   Glucose 111 H 50 L 92   Sodium 145 144 139   Potassium 4.4 4.2 4.1   BUN, Bld 13 20 19   Creatinine 0.8 1.0 0.7   eGFR if  >60 >60 >60   eGFR if non African American >60 >60 >60   Calcium 9.8 9.6 8.8   Magnesium  --   --  1.9       CARDIAC BIOMARKERS:  Recent Labs   Lab 07/05/19  2144 07/06/19  0223 10/11/19  0412 10/11/19  0745 10/11/19  1326   Troponin I 0.013 <0.006 0.025 0.012 0.018       COAGS:        LIPIDS/LFTS:  Recent Labs   Lab 07/05/19  1605 10/11/19  0412   AST 21 21   ALT 8 L 11       BNP:  Recent Labs   Lab 07/05/19  1605    H       TSH:  Recent Labs   Lab 10/11/19  0745   TSH 0.474       Free T4:        Diagnostic Results:  ECG (personally reviewed and interpreted tracing(s)):  10/11/19 0422 SR 92, RBBB, similar to 7/5/19    Chest X-Ray (personally reviewed and interpreted image(s)): 10/11/19 NAD    Echo: 10/11/19 (images personally reviewed and interpreted)  · Severely decreased left ventricular systolic function. The estimated ejection fraction is 20%  · Severe global hypokinesis with inferoposterolateral WMA.  · Mild eccentric left ventricular hypertrophy.  · Severe left ventricular enlargement.  · Grade I (mild) left ventricular diastolic dysfunction consistent with impaired relaxation.  · Mild right ventricular enlargement.  · Moderately reduced right ventricular systolic function.  · The sinuses of Valsalva is mildly dilated, 4.1cm.  · Mild aortic valve stenosis. Functionally bicuspid valve with fusion of right and left coronary cusps.  · Aortic valve area is 1.96 cm2; peak velocity is 2.39 m/s; mean gradient is 14 mmHg.  · Mild mitral regurgitation.  · The estimated PA systolic pressure is 22 mm  Hg

## 2019-10-12 NOTE — ASSESSMENT & PLAN NOTE
Not clear the patient has PAF (currently in SR) as he describes a clot in his leg as the reason he is on eliquis.  No EKG documentation of AF in our records.  If he had AF, CHADSVASC 1.  Now off eliquis

## 2019-10-12 NOTE — ASSESSMENT & PLAN NOTE
Patient reports loss of consciousness for about an hour, he has some traumatic brain injury and is reporting a history of seizure, appears to be reliable historian, he was found on ABG to be acidotic pH 7.30 for an bicarb 21.2, serum CO2 = 20, and anion gap of 17,  and lactic vein acid of 4.0, with alcohol level of 104.  His tox screen negative for drugs of abuse; was also found to have blood pressure to be in the 80s at the time of presentation with tachycardia as high as 113 and respiratory rate 28  -patient is a challenge to rehydrate as he has LVEF 25% and grade 2 diastolic dysfunction  -he is alert and appropriate at this time  -he got an unspecific amount fluids on EMS and a half Liter normal saline at the time presentation to the emergency room.  Patient also received 150 cc a banana bag  -will DC fluids for now to prevent fluid volume overload  -add supplemental oxygen 2 L  -repeat lactic acid in am

## 2019-10-12 NOTE — ASSESSMENT & PLAN NOTE
Patient's blood pressure was noted to be in the 80s at the time of presentation with tachycardia as high as 113 and respiratory rate 28 - his pressure did improve with a bolus of fluid administered in the emergency room for hypotension; fluids were discontinued given his heart failure 25% and rales on auscultation posteriorly.  -monitor blood pressure closely  -insert Barboza catheter  -strict I&Os  -encourage p.o. intake    No need for barboza

## 2019-10-12 NOTE — ASSESSMENT & PLAN NOTE
???seizure - denied prodrome for me - Cards noted he admitted prodrome for him???? Other options, alcohol intoxication - mechanical fall while intoxicated - Cardiac event/arrhythmia/syncope/hypovolemia  -consults to neurology and cardiology  -takes Donis, his brother prepares his medication at home; he is not sure what his medications are  -Heart failure history of afib    Doubt cardiac origin. Likely orthostatic from ETOH abuse

## 2019-10-12 NOTE — PROGRESS NOTES
"[facility] Medicine  Progress Note    Patient Name: Pierre Pierson  MRN: [id number]  Patient Class: IP- Inpatient   Admission Date: 10/11/2019  Length of Stay: 1 days  Attending Physician: Warner Ibarra MD  Primary Care Provider: [facility]        Subjective:     Principal Problem:Syncope and collapse        HPI:  Pierre Pierson is a 59 y.o. WM patient with PMHx Fractured skull (~1989), non-specific seizure activity, afib (on dig), CHF (LVEF 25% + GII DD, 7/20/19), current everyday-every-other-day alcohol drinker who presented for evaluation of seizure activity and acute onset of hypotension with associated tachycardia. States after being kicked out of his house wher he lives with his brother, he walked up the road and "black-out" thinks he was out for about an hour, he awaken his phone had enough life where he could call 911. He tells me he did not have a prodrome prior to his syncope episode and did not loose bowel or bladder functions. He had a little charge on his phone after awakening and states he called EMS at that time.    He was found to be hypotensive by EMS and upon arrival to hospital BP was 92/50, respiration 28 and BP down to 82/55. He got 1/2 liter of IV fluids BP improved to 94/59; lactic acid 4.0; alcohol level 104; ABG shows metabolic acidosis        This is an acute management of hypotension in a patient with alcohol abuse, tachycardia, seizure and heart failure with LVEF 25%. He got one half liter of fluids and 187 of banana drinking total - He tells me that he has not urinated since yesterday 1 day prior to admit 10/10/19. He is alert and oriented and conversing without problems while at rest. He does exhibit rales posterior R upper lower lung fields - diminished L posterior - Sinus rhythm -->sinus tachycardia    1 pint about 3-4 times week; Last yesterday 11:30am  Brother prepares his medications daily bc he has some memory loss 2/2 " traumatic bran injury    Overview/Hospital Course:  Pierre Pierson is a 59 y.o. WM patient with PMHx Fractured skull (~1989), non-specific seizure activity, afib (on dig), CHF (LVEF 25% + GII DD, 7/20/19), current everyday-every-other-day alcohol drinker who presented for evaluation of seizure activity and acute onset of hypotension with associated tachycardia. Patient reportedly had a syncopal episode. Patient had prodrome so likely not cardiac. Cards and neuro were consulted.  Patient clinically improved. Patient had repeat echo and found to have an EF of 20%.    Interval History: No new issues.     Review of Systems   Constitutional: Negative for activity change.   HENT: Negative for congestion.    Respiratory: Negative for chest tightness and shortness of breath.    Cardiovascular: Negative for chest pain.   Gastrointestinal: Negative for abdominal pain.   Genitourinary: Negative for difficulty urinating.     Objective:     Vital Signs (Most Recent):  Temp: 97.7 °F (36.5 °C) (10/12/19 0730)  Pulse: 62 (10/12/19 0730)  Resp: 18 (10/12/19 0730)  BP: 106/64 (10/12/19 0730)  SpO2: 97 % (10/12/19 0730) Vital Signs (24h Range):  Temp:  [97.7 °F (36.5 °C)-98.3 °F (36.8 °C)] 97.7 °F (36.5 °C)  Pulse:  [] 62  Resp:  [18-28] 18  SpO2:  [94 %-97 %] 97 %  BP: ()/(58-67) 106/64     Weight: 63.2 kg (139 lb 5.3 oz)  Body mass index is 19.43 kg/m².    Intake/Output Summary (Last 24 hours) at 10/12/2019 0737  Last data filed at 10/12/2019 0500  Gross per 24 hour   Intake 459.58 ml   Output 1851 ml   Net -1391.42 ml      Physical Exam   Constitutional: He is oriented to person, place, and time. He appears well-developed and well-nourished.   HENT:   Head: Normocephalic and atraumatic.   Neurological: He is alert and oriented to person, place, and time.   Skin: Skin is warm and dry.   Psychiatric: He has a normal mood and affect. His behavior is normal.   Nursing note and vitals reviewed.      Significant Labs:   BMP:    Recent Labs   Lab 10/12/19  0553   GLU 92      K 4.1      CO2 28   BUN 19   CREATININE 0.7   CALCIUM 8.8   MG 1.9     CBC:   Recent Labs   Lab 10/11/19  0412 10/12/19  0553   WBC 10.44 6.03   HGB 13.9* 12.3*   HCT 42.0 38.3*    156       Significant Imaging:       Assessment/Plan:      * Syncope and collapse  ???seizure - denied prodrome for me - Cards noted he admitted prodrome for him???? Other options, alcohol intoxication - mechanical fall while intoxicated - Cardiac event/arrhythmia/syncope/hypovolemia  -consults to neurology and cardiology  -takes Donis, his brother prepares his medication at home; he is not sure what his medications are  -Heart failure history of afib    Doubt cardiac origin. Likely orthostatic from ETOH abuse     Chronic combined systolic and diastolic heart failure  Patient with known heart failure, last 2D echo showed 25% LVEF with grade 2 diastolic dysfunction and some atrial enlargement, 07/07/2019  -discontinue IV fluids for now  -monitor patient's renal functions closely    Repeat echo now shows EF of 20%. Will let cards comment on any further recs.     ETOH abuse  Alcohol consumption and abuse likely contributory to the patient's LOC as he denies having a prodrome or loss of bowel or bladder functions post loss of consciousness.  His alcohol level was greater than 100 and lactic acid was 4 at the time of presentation; alcohol intoxication vs seizure vs arrythmia?? (Hx afib/heart failure)  -neuro checks  -monitor closely  -diazepam 2 mg q.8 hours DT prophylaxis      Hypotension due to drugs  Patient's blood pressure was noted to be in the 80s at the time of presentation with tachycardia as high as 113 and respiratory rate 28 - his pressure did improve with a bolus of fluid administered in the emergency room for hypotension; fluids were discontinued given his heart failure 25% and rales on auscultation posteriorly.  -monitor blood pressure closely  -insert Tan  catheter  -strict I&Os  -encourage p.o. intake    No need for barboza     Acidosis, metabolic  Patient reports loss of consciousness for about an hour, he has some traumatic brain injury and is reporting a history of seizure, appears to be reliable historian, he was found on ABG to be acidotic pH 7.30 for an bicarb 21.2, serum CO2 = 20, and anion gap of 17,  and lactic vein acid of 4.0, with alcohol level of 104.  His tox screen negative for drugs of abuse; was also found to have blood pressure to be in the 80s at the time of presentation with tachycardia as high as 113 and respiratory rate 28  -patient is a challenge to rehydrate as he has LVEF 25% and grade 2 diastolic dysfunction  -he is alert and appropriate at this time  -he got an unspecific amount fluids on EMS and a half Liter normal saline at the time presentation to the emergency room.  Patient also received 150 cc a banana bag  -will DC fluids for now to prevent fluid volume overload  -add supplemental oxygen 2 L  -repeat lactic acid in am    Hypotension  Likely from dehydration from ETOH abuse       PAF (paroxysmal atrial fibrillation)  Patient has history of chronic atrial fib takes digoxin and digoxin level was found to be low = 0.2 at the time of presentation.  He was noted to be in sinus rhythm  -restart day as daily  -EKG  -chest 2 score = 3 (hypertension, heart failure) does not appear that he is on oral anticoagulation will consult Cardiology for opinion  -Lovenox while admitted  -inpatient consult Cardiology      Seizure disorder  Although the patient has a history of seizure disorder I doubt seizure as cause of his loss of consciousness as he denied a prodrome for me in early morning - he has however admitted having prodrome in the hours since his initial assessment - and did not have bowel or bladder function loss.  Patient also did not report a postictal period.  His LOC likely related to alcohol use and intoxication if not entirely, at least  contributory ??alcoholic seizures?? restart patient's home dose of Keppra and monitor closely  -seizure precaution  -Neuro assessment    Not known if actual seizure- but being treated as such.         VTE Risk Mitigation (From admission, onward)         Ordered     enoxaparin injection 40 mg  Daily      10/11/19 1209     IP VTE HIGH RISK PATIENT  Once      10/11/19 0857              Follow cards and neuro recs.         Warner Goodwin MD  Department of Hospital Medicine   [facility]

## 2019-10-12 NOTE — HOSPITAL COURSE
10/11/19: adm with LOC, ?seizure vs cardiac.  Hx of severe LV dysfxn    Interval Hx: No cp/sob, wants to go home.  How recalls having a cath within the last year at [facility] in [place].  Discussed with med records, pt has NICM.    Tele: SR 70s, no vent arrhythmias noted (personally reviewed and interpreted)

## 2019-10-12 NOTE — HOSPITAL COURSE
Pierre Pierson is a 59 y.o. WM patient with PMHx Fractured skull (~1989), non-specific seizure activity, afib (on dig), CHF (LVEF 25% + GII DD, 7/20/19), current everyday-every-other-day alcohol drinker who presented for evaluation of seizure activity and acute onset of hypotension with associated tachycardia. Patient reportedly had a syncopal episode. Cards and neuro were consulted.  Patient clinically improved. Patient had repeat echo and found to have an EF of 20%. Cards recommended a Life Vest before discharge in case syncope was due to cardiac event. Patient received life vest. Medications adjusted- see discharge med rec. He was discharged to [address]

## 2019-10-12 NOTE — ASSESSMENT & PLAN NOTE
Although the patient has a history of seizure disorder I doubt seizure as cause of his loss of consciousness as he denied a prodrome for me in early morning - he has however admitted having prodrome in the hours since his initial assessment - and did not have bowel or bladder function loss.  Patient also did not report a postictal period.  His LOC likely related to alcohol use and intoxication if not entirely, at least contributory ??alcoholic seizures?? restart patient's home dose of Keppra and monitor closely  -seizure precaution  -Neuro assessment    Not known if actual seizure- but being treated as such.

## 2019-10-12 NOTE — ASSESSMENT & PLAN NOTE
Alcohol consumption and abuse likely contributory to the patient's LOC as he denies having a prodrome or loss of bowel or bladder functions post loss of consciousness.  His alcohol level was greater than 100 and lactic acid was 4 at the time of presentation; alcohol intoxication vs seizure vs arrythmia?? (Hx afib/heart failure)  -neuro checks  -monitor closely  -diazepam 2 mg q.8 hours DT prophylaxis

## 2019-10-12 NOTE — ASSESSMENT & PLAN NOTE
Patient with known heart failure, last 2D echo showed 25% LVEF with grade 2 diastolic dysfunction and some atrial enlargement, 07/07/2019  -discontinue IV fluids for now  -monitor patient's renal functions closely    Repeat echo now shows EF of 20%. Will let cards comment on any further recs.

## 2019-10-12 NOTE — ASSESSMENT & PLAN NOTE
Echo 10/2019 with EF 20% and ILP WMA and mild (?biscuspid) AS, ?etiology.  ?isch, dilated, Etoh.  Pt appears euvolemic and asymptomatic from a CHF perspective (LOC is concerning for possible malig arrhythmia, but he's had multiple prior syncopal episodes in the past with prodrome suggesting possible neurological focus, although seen by neuro with thought that LOC is cardiogenic).  Cont low dose coreg +/- eventual ACEi if BP will allow.  Cath planned 10/14/19

## 2019-10-12 NOTE — PROGRESS NOTES
[facility] [place]  Cardiology  Progress Note    Patient Name: Pierre Pierson  MRN: [id number]  Admission Date: 10/11/2019  Hospital Length of Stay: 1 days  Code Status: Full Code   Attending Physician: Warner Ibarra MD   Primary Care Physician: Denia Vasquez [facility]  Expected Discharge Date:   Principal Problem:Syncope and collapse    Subjective:     Hospital Course:   10/11/19: adm with LOC, ?seizure vs cardiac.  Hx of severe LV dysfxn    Interval Hx: No cp/sob, feeling well.  Seen by neuro and LOC not thought to be epileptic/neurogenic.    Tele: SR 60s, no vent arrhythmias noted (personally reviewed and interpreted)      Past Medical History:   Diagnosis Date    Asthma     COPD (chronic obstructive pulmonary disease)     Seizures        History reviewed. No pertinent surgical history.    Review of patient's allergies indicates:   Allergen Reactions    Keflex [cephalexin]        No current facility-administered medications on file prior to encounter.      Current Outpatient Medications on File Prior to Encounter   Medication Sig    albuterol (ACCUNEB) 1.25 mg/3 mL Nebu Take 3 mLs (1.25 mg total) by nebulization every 6 (six) hours as needed. Rescue    digoxin (LANOXIN) 125 mcg tablet Take 1 tablet (125 mcg total) by mouth once daily.    famotidine (PEPCID) 20 MG tablet Take 20 mg by mouth 2 (two) times daily.    levETIRAcetam (KEPPRA) 1000 MG tablet Take 1 tablet (1,000 mg total) by mouth 2 (two) times daily.    multivitamin (THERAGRAN) per tablet Take 1 tablet by mouth once daily.    spironolactone (ALDACTONE) 25 MG tablet Take 1 tablet (25 mg total) by mouth once daily.    umeclidinium-vilanterol (ANORO ELLIPTA) 62.5-25 mcg/actuation DsDv Inhale 1 puff into the lungs once daily. Controller     Family History     None        Tobacco Use    Smoking status: Current Every Day Smoker     Packs/day: 0.50     Types: Cigarettes   Substance and Sexual Activity    Alcohol use: Yes      Comment: some day drinker    Drug use: Not Currently    Sexual activity: Not on file     Review of Systems   Gastrointestinal: Negative for melena.   Genitourinary: Negative for hematuria.     Objective:     Vital Signs (Most Recent):  Temp: 97.7 °F (36.5 °C) (10/12/19 0730)  Pulse: 62 (10/12/19 0730)  Resp: 18 (10/12/19 0730)  BP: 106/64 (10/12/19 0730)  SpO2: 96 % (10/12/19 0800) Vital Signs (24h Range):  Temp:  [97.7 °F (36.5 °C)-98.3 °F (36.8 °C)] 97.7 °F (36.5 °C)  Pulse:  [59-87] 62  Resp:  [18-19] 18  SpO2:  [94 %-97 %] 96 %  BP: (100-117)/(58-67) 106/64     Weight: 63.2 kg (139 lb 5.3 oz)  Body mass index is 19.43 kg/m².    SpO2: 96 %  O2 Device (Oxygen Therapy): room air      Intake/Output Summary (Last 24 hours) at 10/12/2019 1023  Last data filed at 10/12/2019 0900  Gross per 24 hour   Intake 459.58 ml   Output 2151 ml   Net -1691.42 ml       Lines/Drains/Airways     Peripheral Intravenous Line                 Peripheral IV - Single Lumen 10/11/19 0413 20 G Right Hand 1 day              Exam unchanged vs 10/11/19  Physical Exam   Constitutional: He is oriented to person, place, and time. He appears well-developed and well-nourished.   HENT:   Head: Normocephalic and atraumatic.   Eyes: Pupils are equal, round, and reactive to light. Conjunctivae and EOM are normal. No scleral icterus.   Neck: Normal range of motion. Neck supple. No JVD present. Carotid bruit is not present. No tracheal deviation present. No thyromegaly present.   Cardiovascular: Normal rate, regular rhythm, S1 normal and S2 normal. Exam reveals no gallop and no friction rub.   No murmur heard.  Pulmonary/Chest: Effort normal and breath sounds normal. No respiratory distress. He has no wheezes. He has no rales. He exhibits no tenderness.   Abdominal: Soft. He exhibits no distension.   Musculoskeletal: Normal range of motion. He exhibits no edema.   Neurological: He is alert and oriented to person, place, and time. He has normal  strength. No cranial nerve deficit.   Skin: Skin is warm and dry. No rash noted.   Psychiatric: He has a normal mood and affect. His behavior is normal.       Current Medications:   carvedilol  3.125 mg Oral BID    diazePAM  2 mg Oral Q8H    enoxaparin  40 mg Subcutaneous Daily    famotidine  20 mg Oral BID    folic acid  1 mg Oral Daily    levETIRAcetam  1,000 mg Oral BID    senna-docusate 8.6-50 mg  1 tablet Oral BID    tamsulosin  0.4 mg Oral Daily    thiamine  100 mg Oral Daily       albuterol, ibuprofen, influenza, ondansetron, ondansetron, oxyCODONE, pneumoc 13-roddy conj-dip cr(PF), sodium chloride 0.9%    Laboratory (all labs reviewed):  CBC:  Recent Labs   Lab 07/05/19  1605 10/11/19  0412 10/12/19  0553   WBC 12.81 H 10.44 6.03   Hemoglobin 13.8 L 13.9 L 12.3 L   Hematocrit 45.0 42.0 38.3 L   Platelets 234 199 156       CHEMISTRIES:  Recent Labs   Lab 07/05/19  1605 10/11/19  0412 10/12/19  0553   Glucose 111 H 50 L 92   Sodium 145 144 139   Potassium 4.4 4.2 4.1   BUN, Bld 13 20 19   Creatinine 0.8 1.0 0.7   eGFR if  >60 >60 >60   eGFR if non African American >60 >60 >60   Calcium 9.8 9.6 8.8   Magnesium  --   --  1.9       CARDIAC BIOMARKERS:  Recent Labs   Lab 07/05/19  2144 07/06/19  0223 10/11/19  0412 10/11/19  0745 10/11/19  1326   Troponin I 0.013 <0.006 0.025 0.012 0.018       COAGS:        LIPIDS/LFTS:  Recent Labs   Lab 07/05/19  1605 10/11/19  0412   AST 21 21   ALT 8 L 11       BNP:  Recent Labs   Lab 07/05/19  1605    H       TSH:  Recent Labs   Lab 10/11/19  0745   TSH 0.474       Free T4:        Diagnostic Results:  ECG (personally reviewed and interpreted tracing(s)):  10/11/19 0422 SR 92, RBBB, similar to 7/5/19    Chest X-Ray (personally reviewed and interpreted image(s)): 10/11/19 NAD    Echo: 10/11/19 (images personally reviewed and interpreted)  · Severely decreased left ventricular systolic function. The estimated ejection fraction is 20%  · Severe  global hypokinesis with inferoposterolateral WMA.  · Mild eccentric left ventricular hypertrophy.  · Severe left ventricular enlargement.  · Grade I (mild) left ventricular diastolic dysfunction consistent with impaired relaxation.  · Mild right ventricular enlargement.  · Moderately reduced right ventricular systolic function.  · The sinuses of Valsalva is mildly dilated, 4.1cm.  · Mild aortic valve stenosis. Functionally bicuspid valve with fusion of right and left coronary cusps.  · Aortic valve area is 1.96 cm2; peak velocity is 2.39 m/s; mean gradient is 14 mmHg.  · Mild mitral regurgitation.  · The estimated PA systolic pressure is 22 mm Hg        Assessment and Plan:     * Syncope and collapse  Pt reports prodrome prior to LOC, similar to multiple prior episodes.  No associated palps/CP.  No anginal sxs.  See by neuro and LOC not thought to be epileptogenic.  Cont tele monitoring.  Echo with severe LV dysfxn and IPL WMA  Will plan cath 10/14/19 (R&L)  Start ASA/Plavix (with load today)    Chronic combined systolic and diastolic heart failure  Echo 10/2019 with EF 20% and ILP WMA and mild (?biscuspid) AS, ?etiology.  ?isch, dilated, Etoh.  Pt appears euvolemic and asymptomatic from a CHF perspective (LOC is concerning for possible malig arrhythmia, but he's had multiple prior syncopal episodes in the past with prodrome suggesting possible neurological focus, although seen by neuro with thought that LOC is cardiogenic).  Cont low dose coreg +/- eventual ACEi if BP will allow.  Cath planned 10/14/19    PAF (paroxysmal atrial fibrillation)  Not clear the patient has PAF (currently in SR) as he describes a clot in his leg as the reason he is on eliquis.  No EKG documentation of AF in our records.  If he had AF, CHADSVASC 1.  Now off eliquis    Seizure disorder  Per neuro        VTE Risk Mitigation (From admission, onward)         Ordered     enoxaparin injection 40 mg  Daily      10/11/19 1209     IP VTE HIGH RISK  PATIENT  Once      10/11/19 0857                Chang Lewis MD  Cardiology  [facility]

## 2019-10-12 NOTE — ASSESSMENT & PLAN NOTE
Pt reports prodrome prior to LOC, similar to multiple prior episodes.  No associated palps/CP.  No anginal sxs.  See by neuro and LOC not thought to be epileptogenic.  Cont tele monitoring.  Echo with severe LV dysfxn and IPL WMA  Will plan cath 10/14/19 (R&L)  Start ASA/Plavix (with load today)

## 2019-10-13 LAB
POCT GLUCOSE: 122 MG/DL (ref 70–110)
POCT GLUCOSE: 123 MG/DL (ref 70–110)
POCT GLUCOSE: 129 MG/DL (ref 70–110)
POCT GLUCOSE: 66 MG/DL (ref 70–110)

## 2019-10-13 PROCEDURE — 99233 PR SUBSEQUENT HOSPITAL CARE,LEVL III: ICD-10-PCS | Mod: ,,, | Performed by: INTERNAL MEDICINE

## 2019-10-13 PROCEDURE — 21400001 HC TELEMETRY ROOM

## 2019-10-13 PROCEDURE — 99233 SBSQ HOSP IP/OBS HIGH 50: CPT | Mod: ,,, | Performed by: INTERNAL MEDICINE

## 2019-10-13 PROCEDURE — 63600175 PHARM REV CODE 636 W HCPCS: Performed by: NURSE PRACTITIONER

## 2019-10-13 PROCEDURE — 99900035 HC TECH TIME PER 15 MIN (STAT)

## 2019-10-13 PROCEDURE — 25000003 PHARM REV CODE 250: Performed by: EMERGENCY MEDICINE

## 2019-10-13 PROCEDURE — 25000003 PHARM REV CODE 250: Performed by: NURSE PRACTITIONER

## 2019-10-13 PROCEDURE — 25000003 PHARM REV CODE 250: Performed by: INTERNAL MEDICINE

## 2019-10-13 PROCEDURE — 94761 N-INVAS EAR/PLS OXIMETRY MLT: CPT

## 2019-10-13 RX ORDER — LISINOPRIL 2.5 MG/1
2.5 TABLET ORAL DAILY
Status: DISCONTINUED | OUTPATIENT
Start: 2019-10-13 | End: 2019-10-15 | Stop reason: HOSPADM

## 2019-10-13 RX ADMIN — DIAZEPAM 2 MG: 2 TABLET ORAL at 09:10

## 2019-10-13 RX ADMIN — CARVEDILOL 3.12 MG: 3.12 TABLET, FILM COATED ORAL at 08:10

## 2019-10-13 RX ADMIN — LEVETIRACETAM 1000 MG: 500 TABLET ORAL at 08:10

## 2019-10-13 RX ADMIN — SENNOSIDES, DOCUSATE SODIUM 1 TABLET: 50; 8.6 TABLET, FILM COATED ORAL at 08:10

## 2019-10-13 RX ADMIN — DIAZEPAM 2 MG: 2 TABLET ORAL at 01:10

## 2019-10-13 RX ADMIN — LISINOPRIL 2.5 MG: 2.5 TABLET ORAL at 01:10

## 2019-10-13 RX ADMIN — FAMOTIDINE 20 MG: 20 TABLET ORAL at 08:10

## 2019-10-13 RX ADMIN — TAMSULOSIN HYDROCHLORIDE 0.4 MG: 0.4 CAPSULE ORAL at 08:10

## 2019-10-13 RX ADMIN — Medication 100 MG: at 08:10

## 2019-10-13 RX ADMIN — FOLIC ACID 1 MG: 1 TABLET ORAL at 08:10

## 2019-10-13 RX ADMIN — CLOPIDOGREL BISULFATE 75 MG: 75 TABLET ORAL at 08:10

## 2019-10-13 RX ADMIN — DIAZEPAM 2 MG: 2 TABLET ORAL at 06:10

## 2019-10-13 RX ADMIN — ENOXAPARIN SODIUM 40 MG: 100 INJECTION SUBCUTANEOUS at 05:10

## 2019-10-13 RX ADMIN — ASPIRIN 81 MG: 81 TABLET, COATED ORAL at 08:10

## 2019-10-13 NOTE — PROGRESS NOTES
Patient stated no pain during shift report; he said he was diverting his mind elsewhere with hand-held electronic game and watching television.  Bowel movement was found unflushed in toilet at 0630 hours; b/m recorded in charting.  Shift report given to oncoming nurse.

## 2019-10-13 NOTE — PROGRESS NOTES
LCSW met with patient regarding report to cardiology that he is homeless. Patient was living with his brother, but his brother does not want the patient to return to him home.     Possible living options were discussed with patient, including: referral for [facility]; shelters; independent living; assisted living; other family members-none available; and possible nursing home placement.     Patient reported that he is independent with self-care and home-making skills, but given his seizure disorder, he is unable to drive to get his medications. Seizures are regulated with his current medication therapy.     Patient will need assist post-cath on Monday with Dr. Lewis. Information handed-off to CM team to follow-up with this patient.

## 2019-10-13 NOTE — PLAN OF CARE
10/13/19 1045   Final Note   Assessment Type Final Discharge Note   Anticipated Discharge Disposition Home  (With [facility]; to follow-up with )   What phone number can be called within the next 1-3 days to see how you are doing after discharge?   ([phone] )   Hospital Follow Up  Appt(s) scheduled? No  (Unable to schedule with )   Discharge plans and expectations educations in teach back method with documentation complete? Yes   Right Care Referral Info   Post Acute Recommendation Other   Referral Type DME   Facility Name [facility]

## 2019-10-13 NOTE — PROGRESS NOTES
"[facility] Medicine  Progress Note    Patient Name: Pierre Pierson  MRN: [id number]  Patient Class: IP- Inpatient   Admission Date: 10/11/2019  Length of Stay: 2 days  Attending Physician: Warner Ibarra MD  Primary Care Provider: [facility]        Subjective:     Principal Problem:Syncope and collapse        HPI:  Pierre Pierson is a 59 y.o. WM patient with PMHx Fractured skull (~1989), non-specific seizure activity, afib (on dig), CHF (LVEF 25% + GII DD, 7/20/19), current everyday-every-other-day alcohol drinker who presented for evaluation of seizure activity and acute onset of hypotension with associated tachycardia. States after being kicked out of his house wher he lives with his brother, he walked up the road and "black-out" thinks he was out for about an hour, he awaken his phone had enough life where he could call 911. He tells me he did not have a prodrome prior to his syncope episode and did not loose bowel or bladder functions. He had a little charge on his phone after awakening and states he called EMS at that time.    He was found to be hypotensive by EMS and upon arrival to hospital BP was 92/50, respiration 28 and BP down to 82/55. He got 1/2 liter of IV fluids BP improved to 94/59; lactic acid 4.0; alcohol level 104; ABG shows metabolic acidosis        This is an acute management of hypotension in a patient with alcohol abuse, tachycardia, seizure and heart failure with LVEF 25%. He got one half liter of fluids and 187 of banana drinking total - He tells me that he has not urinated since yesterday 1 day prior to admit 10/10/19. He is alert and oriented and conversing without problems while at rest. He does exhibit rales posterior R upper lower lung fields - diminished L posterior - Sinus rhythm -->sinus tachycardia    1 pint about 3-4 times week; Last yesterday 11:30am  Brother prepares his medications daily bc he has some memory loss 2/2 " traumatic bran injury    Overview/Hospital Course:  Pierre Pierson is a 59 y.o. WM patient with PMHx Fractured skull (~1989), non-specific seizure activity, afib (on dig), CHF (LVEF 25% + GII DD, 7/20/19), current everyday-every-other-day alcohol drinker who presented for evaluation of seizure activity and acute onset of hypotension with associated tachycardia. Patient reportedly had a syncopal episode. Patient had prodrome so likely not cardiac. Cards and neuro were consulted.  Patient clinically improved. Patient had repeat echo and found to have an EF of 20%. Cards recommended a Life Vest before discharge. Once has can be discharged to home with follow up with [facility].     No new subjective & objective note has been filed under this hospital service since the last note was generated.      Assessment/Plan:      * Syncope and collapse  ???seizure - denied prodrome for me - Cards noted he admitted prodrome for him???? Other options, alcohol intoxication - mechanical fall while intoxicated - Cardiac event/arrhythmia/syncope/hypovolemia  -consults to neurology and cardiology  -takes Donis, his brother prepares his medication at home; he is not sure what his medications are  -Heart failure history of afib    Doubt cardiac origin. Likely orthostatic from ETOH abuse   No further syncope     Chronic combined systolic and diastolic heart failure  Patient with known heart failure, last 2D echo showed 25% LVEF with grade 2 diastolic dysfunction and some atrial enlargement, 07/07/2019  -discontinue IV fluids for now  -monitor patient's renal functions closely    Repeat echo now shows EF of 20%. Will let cards comment on any further recs.     ETOH abuse  Alcohol consumption and abuse likely contributory to the patient's LOC as he denies having a prodrome or loss of bowel or bladder functions post loss of consciousness.  His alcohol level was greater than 100 and lactic acid was 4 at the time of presentation; alcohol  intoxication vs seizure vs arrythmia?? (Hx afib/heart failure)  -neuro checks  -monitor closely  -diazepam 2 mg q.8 hours DT prophylaxis      Hypotension due to drugs  Patient's blood pressure was noted to be in the 80s at the time of presentation with tachycardia as high as 113 and respiratory rate 28 - his pressure did improve with a bolus of fluid administered in the emergency room for hypotension; fluids were discontinued given his heart failure 25% and rales on auscultation posteriorly.  -monitor blood pressure closely  -insert Barboza catheter  -strict I&Os  -encourage p.o. intake    No need for barboza     Acidosis, metabolic  Patient reports loss of consciousness for about an hour, he has some traumatic brain injury and is reporting a history of seizure, appears to be reliable historian, he was found on ABG to be acidotic pH 7.30 for an bicarb 21.2, serum CO2 = 20, and anion gap of 17,  and lactic vein acid of 4.0, with alcohol level of 104.  His tox screen negative for drugs of abuse; was also found to have blood pressure to be in the 80s at the time of presentation with tachycardia as high as 113 and respiratory rate 28  -patient is a challenge to rehydrate as he has LVEF 25% and grade 2 diastolic dysfunction  -he is alert and appropriate at this time  -he got an unspecific amount fluids on EMS and a half Liter normal saline at the time presentation to the emergency room.  Patient also received 150 cc a banana bag  -will DC fluids for now to prevent fluid volume overload  -add supplemental oxygen 2 L  -repeat lactic acid in am    Hypotension  Likely from dehydration from ETOH abuse       PAF (paroxysmal atrial fibrillation)  Patient has history of chronic atrial fib takes digoxin and digoxin level was found to be low = 0.2 at the time of presentation.  He was noted to be in sinus rhythm  -restart day as daily  -EKG  -chest 2 score = 3 (hypertension, heart failure) does not appear that he is on oral  anticoagulation will consult Cardiology for opinion  -Lovenox while admitted  -inpatient consult Cardiology      Seizure disorder  Although the patient has a history of seizure disorder I doubt seizure as cause of his loss of consciousness as he denied a prodrome for me in early morning - he has however admitted having prodrome in the hours since his initial assessment - and did not have bowel or bladder function loss.  Patient also did not report a postictal period.  His LOC likely related to alcohol use and intoxication if not entirely, at least contributory ??alcoholic seizures?? restart patient's home dose of Keppra and monitor closely  -seizure precaution  -Neuro assessment    Not known if actual seizure- but being treated as such.         VTE Risk Mitigation (From admission, onward)         Ordered     enoxaparin injection 40 mg  Daily      10/11/19 1209     IP VTE HIGH RISK PATIENT  Once      10/11/19 5277                      Warner Goodwin MD  Department of Hospital Medicine   [facility]

## 2019-10-13 NOTE — PROGRESS NOTES
WRITTEN DISCHARGE INSTRUCTIONS    Follow-up Information     [facility] [place]. Schedule an appointment as soon as possible for a visit in 1 week.    Why:  Call on Monday to schedule hospital cardiology and primary care follow-up appointments and to establish medical care with [facility]   Contact information:  [address] [place] [address]  [phone]               HELP AT HOME: Ensure that you have someone to help you and to manage your healthcare at home.  [phone] After discharge for assistance with [facility] Line open 24/7 for assistance.    Things you are responsible for to Manage Your Care at Home:  1. Getting your prescriptions filled or picking up those prescriptions that have been called in for you.  2. Taking your medication as directed; DO NOT MISS ANY DOSES!  3. Going to your follow-up doctor appointment(s).  This is important because it allows your doctor to monitor your progress and determine if any changes need to be made to your treatment plan.    Thanks for choosing [facility] for your care. Please answer any calls you may receive from [facility]. We want to continue to support you as you manage your healthcare needs.  We are happy to have the opportunity to serve you.

## 2019-10-13 NOTE — ASSESSMENT & PLAN NOTE
Echo 10/2019 with EF 20% and ILP WMA and mild (?biscuspid) AS.  Etiology appears to be NICM per report from [facility] in [place], [place].   Pt appears euvolemic and asymptomatic from a CHF perspective (LOC is concerning for possible malig arrhythmia, but he's had multiple prior syncopal episodes in the past with prodrome suggesting possible neurological focus, although seen by neuro with thought that LOC is cardiogenic).  Cont low dose coreg  Start low dose lisinopril  Cath cancelled  Lifevest ordered  Will need repeat echo in 3 months.  If EF depressed despite GDMT will need ICD.

## 2019-10-13 NOTE — SUBJECTIVE & OBJECTIVE
Past Medical History:   Diagnosis Date    Asthma     COPD (chronic obstructive pulmonary disease)     Seizures        History reviewed. No pertinent surgical history.    Review of patient's allergies indicates:   Allergen Reactions    Keflex [cephalexin]        No current facility-administered medications on file prior to encounter.      Current Outpatient Medications on File Prior to Encounter   Medication Sig    albuterol (ACCUNEB) 1.25 mg/3 mL Nebu Take 3 mLs (1.25 mg total) by nebulization every 6 (six) hours as needed. Rescue    digoxin (LANOXIN) 125 mcg tablet Take 1 tablet (125 mcg total) by mouth once daily.    famotidine (PEPCID) 20 MG tablet Take 20 mg by mouth 2 (two) times daily.    levETIRAcetam (KEPPRA) 1000 MG tablet Take 1 tablet (1,000 mg total) by mouth 2 (two) times daily.    multivitamin (THERAGRAN) per tablet Take 1 tablet by mouth once daily.    spironolactone (ALDACTONE) 25 MG tablet Take 1 tablet (25 mg total) by mouth once daily.    umeclidinium-vilanterol (ANORO ELLIPTA) 62.5-25 mcg/actuation DsDv Inhale 1 puff into the lungs once daily. Controller     Family History     None        Tobacco Use    Smoking status: Current Every Day Smoker     Packs/day: 0.50     Types: Cigarettes   Substance and Sexual Activity    Alcohol use: Yes     Comment: some day drinker    Drug use: Not Currently    Sexual activity: Not on file     Review of Systems   Gastrointestinal: Negative for melena.   Genitourinary: Negative for hematuria.     Objective:     Vital Signs (Most Recent):  Temp: 97.9 °F (36.6 °C) (10/13/19 0722)  Pulse: (!) 58 (10/13/19 0722)  Resp: 18 (10/13/19 0722)  BP: (!) 140/68 (10/13/19 0722)  SpO2: 97 % (10/13/19 0722) Vital Signs (24h Range):  Temp:  [97.9 °F (36.6 °C)-98.1 °F (36.7 °C)] 97.9 °F (36.6 °C)  Pulse:  [58-67] 58  Resp:  [18] 18  SpO2:  [95 %-97 %] 97 %  BP: ()/(53-68) 140/68     Weight: 63.2 kg (139 lb 5.3 oz)  Body mass index is 19.43 kg/m².    SpO2: 97  %  O2 Device (Oxygen Therapy): room air      Intake/Output Summary (Last 24 hours) at 10/13/2019 0934  Last data filed at 10/13/2019 0653  Gross per 24 hour   Intake 360 ml   Output 1112 ml   Net -752 ml       Lines/Drains/Airways     Peripheral Intravenous Line                 Peripheral IV - Single Lumen 10/11/19 0413 20 G Right Hand 2 days              Exam unchanged vs 10/12/19  Physical Exam   Constitutional: He is oriented to person, place, and time. He appears well-developed and well-nourished.   HENT:   Head: Normocephalic and atraumatic.   Eyes: Pupils are equal, round, and reactive to light. Conjunctivae and EOM are normal. No scleral icterus.   Neck: Normal range of motion. Neck supple. No JVD present. Carotid bruit is not present. No tracheal deviation present. No thyromegaly present.   Cardiovascular: Normal rate, regular rhythm, S1 normal and S2 normal. Exam reveals no gallop and no friction rub.   No murmur heard.  Pulmonary/Chest: Effort normal and breath sounds normal. No respiratory distress. He has no wheezes. He has no rales. He exhibits no tenderness.   Abdominal: Soft. He exhibits no distension.   Musculoskeletal: Normal range of motion. He exhibits no edema.   Neurological: He is alert and oriented to person, place, and time. He has normal strength. No cranial nerve deficit.   Skin: Skin is warm and dry. No rash noted.   Psychiatric: He has a normal mood and affect. His behavior is normal.       Current Medications:   aspirin  81 mg Oral Daily    carvedilol  3.125 mg Oral BID    clopidogrel  75 mg Oral Daily    diazePAM  2 mg Oral Q8H    enoxaparin  40 mg Subcutaneous Daily    famotidine  20 mg Oral BID    folic acid  1 mg Oral Daily    levETIRAcetam  1,000 mg Oral BID    senna-docusate 8.6-50 mg  1 tablet Oral BID    tamsulosin  0.4 mg Oral Daily    thiamine  100 mg Oral Daily      [START ON 10/14/2019] sodium chloride 0.9%       albuterol, ibuprofen, influenza, ondansetron,  ondansetron, oxyCODONE, pneumoc 13-roddy conj-dip cr(PF), sodium chloride 0.9%    Laboratory (all labs reviewed):  CBC:  Recent Labs   Lab 07/05/19  1605 10/11/19  0412 10/12/19  0553   WBC 12.81 H 10.44 6.03   Hemoglobin 13.8 L 13.9 L 12.3 L   Hematocrit 45.0 42.0 38.3 L   Platelets 234 199 156       CHEMISTRIES:  Recent Labs   Lab 07/05/19  1605 10/11/19  0412 10/12/19  0553   Glucose 111 H 50 L 92   Sodium 145 144 139   Potassium 4.4 4.2 4.1   BUN, Bld 13 20 19   Creatinine 0.8 1.0 0.7   eGFR if  >60 >60 >60   eGFR if non African American >60 >60 >60   Calcium 9.8 9.6 8.8   Magnesium  --   --  1.9       CARDIAC BIOMARKERS:  Recent Labs   Lab 07/05/19  2144 07/06/19  0223 10/11/19  0412 10/11/19  0745 10/11/19  1326   Troponin I 0.013 <0.006 0.025 0.012 0.018       COAGS:        LIPIDS/LFTS:  Recent Labs   Lab 07/05/19  1605 10/11/19  0412   AST 21 21   ALT 8 L 11       BNP:  Recent Labs   Lab 07/05/19  1605    H       TSH:  Recent Labs   Lab 10/11/19  0745   TSH 0.474       Free T4:        Diagnostic Results:  ECG (personally reviewed and interpreted tracing(s)):  10/11/19 0422 SR 92, RBBB, similar to 7/5/19    Chest X-Ray (personally reviewed and interpreted image(s)): 10/11/19 NAD    Echo: 10/11/19 (images prev personally reviewed and interpreted)  · Severely decreased left ventricular systolic function. The estimated ejection fraction is 20%  · Severe global hypokinesis with inferoposterolateral WMA.  · Mild eccentric left ventricular hypertrophy.  · Severe left ventricular enlargement.  · Grade I (mild) left ventricular diastolic dysfunction consistent with impaired relaxation.  · Mild right ventricular enlargement.  · Moderately reduced right ventricular systolic function.  · The sinuses of Valsalva is mildly dilated, 4.1cm.  · Mild aortic valve stenosis. Functionally bicuspid valve with fusion of right and left coronary cusps.  · Aortic valve area is 1.96 cm2; peak velocity is 2.39 m/s;  "mean gradient is 14 mmHg.  · Mild mitral regurgitation.  · The estimated PA systolic pressure is 22 mm Hg    Cath: [facility] ([facility], [phone])  3/5/2019 (Dr. Bradford)  "No significant CAD"  Low LVEF  NICM  Consider Lifevest/ICD rx    "

## 2019-10-13 NOTE — PROGRESS NOTES
"[facility]  Progress Note    Patient Name: Pierre Pierson  MRN: [id number]  Patient Class: IP- Inpatient   Admission Date: 10/11/2019  Length of Stay: 2 days  Attending Physician: Warner Ibarra MD  Primary Care Provider: [facility]        Subjective:     Principal Problem:Syncope and collapse        HPI:  Pierre Pierson is a 59 y.o. WM patient with PMHx Fractured skull (~1989), non-specific seizure activity, afib (on dig), CHF (LVEF 25% + GII DD, 7/20/19), current everyday-every-other-day alcohol drinker who presented for evaluation of seizure activity and acute onset of hypotension with associated tachycardia. States after being kicked out of his house wher he lives with his brother, he walked up the road and "black-out" thinks he was out for about an hour, he awaken his phone had enough life where he could call 911. He tells me he did not have a prodrome prior to his syncope episode and did not loose bowel or bladder functions. He had a little charge on his phone after awakening and states he called EMS at that time.    He was found to be hypotensive by EMS and upon arrival to hospital BP was 92/50, respiration 28 and BP down to 82/55. He got 1/2 liter of IV fluids BP improved to 94/59; lactic acid 4.0; alcohol level 104; ABG shows metabolic acidosis        This is an acute management of hypotension in a patient with alcohol abuse, tachycardia, seizure and heart failure with LVEF 25%. He got one half liter of fluids and 187 of banana drinking total - He tells me that he has not urinated since yesterday 1 day prior to admit 10/10/19. He is alert and oriented and conversing without problems while at rest. He does exhibit rales posterior R upper lower lung fields - diminished L posterior - Sinus rhythm -->sinus tachycardia    1 pint about 3-4 times week; Last yesterday 11:30am  Brother prepares his medications daily bc he has some memory loss 2/2 " traumatic bran injury    Overview/Hospital Course:  Pierre Pierson is a 59 y.o. WM patient with PMHx Fractured skull (~1989), non-specific seizure activity, afib (on dig), CHF (LVEF 25% + GII DD, 7/20/19), current everyday-every-other-day alcohol drinker who presented for evaluation of seizure activity and acute onset of hypotension with associated tachycardia. Patient reportedly had a syncopal episode. Patient had prodrome so likely not cardiac. Cards and neuro were consulted.  Patient clinically improved. Patient had repeat echo and found to have an EF of 20%. Cards recommended a Life Vest before discharge. Once has can be discharged to home with follow up with [facility].     Interval History: No new issues. No syncope. Smoked in bathroom     Review of Systems   Constitutional: Negative for activity change.   HENT: Negative for congestion.    Respiratory: Negative for chest tightness and shortness of breath.    Cardiovascular: Negative for chest pain.   Gastrointestinal: Negative for abdominal pain.   Genitourinary: Negative for difficulty urinating.     Objective:     Vital Signs (Most Recent):  Temp: 97.9 °F (36.6 °C) (10/13/19 0722)  Pulse: (!) 58 (10/13/19 0722)  Resp: 18 (10/13/19 0722)  BP: (!) 140/68 (10/13/19 0722)  SpO2: 97 % (10/13/19 0722) Vital Signs (24h Range):  Temp:  [97.9 °F (36.6 °C)-98.1 °F (36.7 °C)] 97.9 °F (36.6 °C)  Pulse:  [58-67] 58  Resp:  [18] 18  SpO2:  [95 %-97 %] 97 %  BP: ()/(53-68) 140/68     Weight: 63.2 kg (139 lb 5.3 oz)  Body mass index is 19.43 kg/m².    Intake/Output Summary (Last 24 hours) at 10/13/2019 1039  Last data filed at 10/13/2019 0845  Gross per 24 hour   Intake 360 ml   Output 1300 ml   Net -940 ml      Physical Exam   Constitutional: He is oriented to person, place, and time. He appears well-developed and well-nourished.   HENT:   Head: Normocephalic and atraumatic.   Neurological: He is alert and oriented to person, place, and time.   Skin: Skin is  warm and dry.   Psychiatric: He has a normal mood and affect. His behavior is normal.   Nursing note and vitals reviewed.      Significant Labs:   BMP:   Recent Labs   Lab 10/12/19  0553   GLU 92      K 4.1      CO2 28   BUN 19   CREATININE 0.7   CALCIUM 8.8   MG 1.9     CBC:   Recent Labs   Lab 10/12/19  0553   WBC 6.03   HGB 12.3*   HCT 38.3*          Significant Imaging      Assessment/Plan:      * Syncope and collapse  ???seizure - denied prodrome for me - Cards noted he admitted prodrome for him???? Other options, alcohol intoxication - mechanical fall while intoxicated - Cardiac event/arrhythmia/syncope/hypovolemia  -consults to neurology and cardiology  -takes Donis, his brother prepares his medication at home; he is not sure what his medications are  -Heart failure history of afib    Doubt cardiac origin. Likely orthostatic from ETOH abuse   No further syncope     Chronic combined systolic and diastolic heart failure  Patient with known heart failure, last 2D echo showed 25% LVEF with grade 2 diastolic dysfunction and some atrial enlargement, 07/07/2019  -discontinue IV fluids for now  -monitor patient's renal functions closely    Repeat echo now shows EF of 20%. Will let cards comment on any further recs.     ETOH abuse  Alcohol consumption and abuse likely contributory to the patient's LOC as he denies having a prodrome or loss of bowel or bladder functions post loss of consciousness.  His alcohol level was greater than 100 and lactic acid was 4 at the time of presentation; alcohol intoxication vs seizure vs arrythmia?? (Hx afib/heart failure)  -neuro checks  -monitor closely  -diazepam 2 mg q.8 hours DT prophylaxis      Hypotension due to drugs  Patient's blood pressure was noted to be in the 80s at the time of presentation with tachycardia as high as 113 and respiratory rate 28 - his pressure did improve with a bolus of fluid administered in the emergency room for hypotension; fluids  were discontinued given his heart failure 25% and rales on auscultation posteriorly.  -monitor blood pressure closely  -insert Barboza catheter  -strict I&Os  -encourage p.o. intake    No need for barboza     Acidosis, metabolic  Patient reports loss of consciousness for about an hour, he has some traumatic brain injury and is reporting a history of seizure, appears to be reliable historian, he was found on ABG to be acidotic pH 7.30 for an bicarb 21.2, serum CO2 = 20, and anion gap of 17,  and lactic vein acid of 4.0, with alcohol level of 104.  His tox screen negative for drugs of abuse; was also found to have blood pressure to be in the 80s at the time of presentation with tachycardia as high as 113 and respiratory rate 28  -patient is a challenge to rehydrate as he has LVEF 25% and grade 2 diastolic dysfunction  -he is alert and appropriate at this time  -he got an unspecific amount fluids on EMS and a half Liter normal saline at the time presentation to the emergency room.  Patient also received 150 cc a banana bag  -will DC fluids for now to prevent fluid volume overload  -add supplemental oxygen 2 L  -repeat lactic acid in am    Hypotension  Likely from dehydration from ETOH abuse       PAF (paroxysmal atrial fibrillation)  Patient has history of chronic atrial fib takes digoxin and digoxin level was found to be low = 0.2 at the time of presentation.  He was noted to be in sinus rhythm  -restart day as daily  -EKG  -chest 2 score = 3 (hypertension, heart failure) does not appear that he is on oral anticoagulation will consult Cardiology for opinion  -Lovenox while admitted  -inpatient consult Cardiology      Seizure disorder  Although the patient has a history of seizure disorder I doubt seizure as cause of his loss of consciousness as he denied a prodrome for me in early morning - he has however admitted having prodrome in the hours since his initial assessment - and did not have bowel or bladder function loss.   Patient also did not report a postictal period.  His LOC likely related to alcohol use and intoxication if not entirely, at least contributory ??alcoholic seizures?? restart patient's home dose of Keppra and monitor closely  -seizure precaution  -Neuro assessment    Not known if actual seizure- but being treated as such.       Tobacco abuse- smoked in bathroom today. Smoking cessation education > 3 minutes.       VTE Risk Mitigation (From admission, onward)         Ordered     enoxaparin injection 40 mg  Daily      10/11/19 1209     IP VTE HIGH RISK PATIENT  Once      10/11/19 0827                      Warner Goodwin MD  Department of Hospital Medicine   [facility]

## 2019-10-13 NOTE — PLAN OF CARE
Pt remains on RA with no complaints of sob/wheezing. Continue ventolin mdi as needed per orders. Will continue to monitor. RUPERT Pack, RRT

## 2019-10-13 NOTE — PLAN OF CARE
10/13/19 1046   Post-Acute Status   Post-Acute Authorization HME  ([facility] Life Vest)   HME Status Set-up Complete   Discharge Delays (!) Patient and Family Barriers  (Patient was living with brother, who does not want him to return; living options reviewed and discussed)

## 2019-10-13 NOTE — ASSESSMENT & PLAN NOTE
Not clear the patient has PAF (currently in SR) as he describes a clot in his leg as the reason he was on eliquis.  No EKG documentation of AF in our records.  If he had AF, CHADSVASC 1.  Now off eliquis

## 2019-10-13 NOTE — PROGRESS NOTES
[facility] [place]  Cardiology  Progress Note    Patient Name: Pierre Pierson  MRN: [id number]  Admission Date: 10/11/2019  Hospital Length of Stay: 2 days  Code Status: Full Code   Attending Physician: Warner Ibarra MD   Primary Care Physician: Denia Vasquez [facility]  Expected Discharge Date:   Principal Problem:Syncope and collapse    Subjective:     Hospital Course:   10/11/19: adm with LOC, ?seizure vs cardiac.  Hx of severe LV dysfxn    Interval Hx: No cp/sob, wants to go home.  How recalls having a cath within the last year at [facility] in [place].  Discussed with med records, pt has NICM.    Tele: SR 70s, no vent arrhythmias noted (personally reviewed and interpreted)      Past Medical History:   Diagnosis Date    Asthma     COPD (chronic obstructive pulmonary disease)     Seizures        History reviewed. No pertinent surgical history.    Review of patient's allergies indicates:   Allergen Reactions    Keflex [cephalexin]        No current facility-administered medications on file prior to encounter.      Current Outpatient Medications on File Prior to Encounter   Medication Sig    albuterol (ACCUNEB) 1.25 mg/3 mL Nebu Take 3 mLs (1.25 mg total) by nebulization every 6 (six) hours as needed. Rescue    digoxin (LANOXIN) 125 mcg tablet Take 1 tablet (125 mcg total) by mouth once daily.    famotidine (PEPCID) 20 MG tablet Take 20 mg by mouth 2 (two) times daily.    levETIRAcetam (KEPPRA) 1000 MG tablet Take 1 tablet (1,000 mg total) by mouth 2 (two) times daily.    multivitamin (THERAGRAN) per tablet Take 1 tablet by mouth once daily.    spironolactone (ALDACTONE) 25 MG tablet Take 1 tablet (25 mg total) by mouth once daily.    umeclidinium-vilanterol (ANORO ELLIPTA) 62.5-25 mcg/actuation DsDv Inhale 1 puff into the lungs once daily. Controller     Family History     None        Tobacco Use    Smoking status: Current Every Day Smoker     Packs/day: 0.50     Types:  Cigarettes   Substance and Sexual Activity    Alcohol use: Yes     Comment: some day drinker    Drug use: Not Currently    Sexual activity: Not on file     Review of Systems   Gastrointestinal: Negative for melena.   Genitourinary: Negative for hematuria.     Objective:     Vital Signs (Most Recent):  Temp: 97.9 °F (36.6 °C) (10/13/19 0722)  Pulse: (!) 58 (10/13/19 0722)  Resp: 18 (10/13/19 0722)  BP: (!) 140/68 (10/13/19 0722)  SpO2: 97 % (10/13/19 0722) Vital Signs (24h Range):  Temp:  [97.9 °F (36.6 °C)-98.1 °F (36.7 °C)] 97.9 °F (36.6 °C)  Pulse:  [58-67] 58  Resp:  [18] 18  SpO2:  [95 %-97 %] 97 %  BP: ()/(53-68) 140/68     Weight: 63.2 kg (139 lb 5.3 oz)  Body mass index is 19.43 kg/m².    SpO2: 97 %  O2 Device (Oxygen Therapy): room air      Intake/Output Summary (Last 24 hours) at 10/13/2019 0934  Last data filed at 10/13/2019 0653  Gross per 24 hour   Intake 360 ml   Output 1112 ml   Net -752 ml       Lines/Drains/Airways     Peripheral Intravenous Line                 Peripheral IV - Single Lumen 10/11/19 0413 20 G Right Hand 2 days              Exam unchanged vs 10/12/19  Physical Exam   Constitutional: He is oriented to person, place, and time. He appears well-developed and well-nourished.   HENT:   Head: Normocephalic and atraumatic.   Eyes: Pupils are equal, round, and reactive to light. Conjunctivae and EOM are normal. No scleral icterus.   Neck: Normal range of motion. Neck supple. No JVD present. Carotid bruit is not present. No tracheal deviation present. No thyromegaly present.   Cardiovascular: Normal rate, regular rhythm, S1 normal and S2 normal. Exam reveals no gallop and no friction rub.   No murmur heard.  Pulmonary/Chest: Effort normal and breath sounds normal. No respiratory distress. He has no wheezes. He has no rales. He exhibits no tenderness.   Abdominal: Soft. He exhibits no distension.   Musculoskeletal: Normal range of motion. He exhibits no edema.   Neurological: He is  alert and oriented to person, place, and time. He has normal strength. No cranial nerve deficit.   Skin: Skin is warm and dry. No rash noted.   Psychiatric: He has a normal mood and affect. His behavior is normal.       Current Medications:   aspirin  81 mg Oral Daily    carvedilol  3.125 mg Oral BID    clopidogrel  75 mg Oral Daily    diazePAM  2 mg Oral Q8H    enoxaparin  40 mg Subcutaneous Daily    famotidine  20 mg Oral BID    folic acid  1 mg Oral Daily    levETIRAcetam  1,000 mg Oral BID    senna-docusate 8.6-50 mg  1 tablet Oral BID    tamsulosin  0.4 mg Oral Daily    thiamine  100 mg Oral Daily      [START ON 10/14/2019] sodium chloride 0.9%       albuterol, ibuprofen, influenza, ondansetron, ondansetron, oxyCODONE, pneumoc 13-roddy conj-dip cr(PF), sodium chloride 0.9%    Laboratory (all labs reviewed):  CBC:  Recent Labs   Lab 07/05/19  1605 10/11/19  0412 10/12/19  0553   WBC 12.81 H 10.44 6.03   Hemoglobin 13.8 L 13.9 L 12.3 L   Hematocrit 45.0 42.0 38.3 L   Platelets 234 199 156       CHEMISTRIES:  Recent Labs   Lab 07/05/19  1605 10/11/19  0412 10/12/19  0553   Glucose 111 H 50 L 92   Sodium 145 144 139   Potassium 4.4 4.2 4.1   BUN, Bld 13 20 19   Creatinine 0.8 1.0 0.7   eGFR if  >60 >60 >60   eGFR if non African American >60 >60 >60   Calcium 9.8 9.6 8.8   Magnesium  --   --  1.9       CARDIAC BIOMARKERS:  Recent Labs   Lab 07/05/19  2144 07/06/19  0223 10/11/19  0412 10/11/19  0745 10/11/19  1326   Troponin I 0.013 <0.006 0.025 0.012 0.018       COAGS:        LIPIDS/LFTS:  Recent Labs   Lab 07/05/19  1605 10/11/19  0412   AST 21 21   ALT 8 L 11       BNP:  Recent Labs   Lab 07/05/19  1605    H       TSH:  Recent Labs   Lab 10/11/19  0745   TSH 0.474       Free T4:        Diagnostic Results:  ECG (personally reviewed and interpreted tracing(s)):  10/11/19 0422 SR 92, RBBB, similar to 7/5/19    Chest X-Ray (personally reviewed and interpreted image(s)): 10/11/19  "NAD    Echo: 10/11/19 (images prev personally reviewed and interpreted)  · Severely decreased left ventricular systolic function. The estimated ejection fraction is 20%  · Severe global hypokinesis with inferoposterolateral WMA.  · Mild eccentric left ventricular hypertrophy.  · Severe left ventricular enlargement.  · Grade I (mild) left ventricular diastolic dysfunction consistent with impaired relaxation.  · Mild right ventricular enlargement.  · Moderately reduced right ventricular systolic function.  · The sinuses of Valsalva is mildly dilated, 4.1cm.  · Mild aortic valve stenosis. Functionally bicuspid valve with fusion of right and left coronary cusps.  · Aortic valve area is 1.96 cm2; peak velocity is 2.39 m/s; mean gradient is 14 mmHg.  · Mild mitral regurgitation.  · The estimated PA systolic pressure is 22 mm Hg    Cath: [facility] ([facility], [phone])  3/5/2019 (Dr. Bradford)  "No significant CAD"  Low LVEF  NICM  Consider Lifevest/ICD rx      Assessment and Plan:     * Syncope and collapse  Pt reports prodrome prior to LOC, similar to multiple prior episodes.  No associated palps/CP.  No anginal sxs.  See by neuro and LOC not thought to be epileptogenic.  Cont tele monitoring.  No evidence of sam or tachyarrhythmia.  Echo with severe LV dysfxn and IPL WMA.  Pt now recalls cath last year at [facility]   Per their med recs, pt has NICM as was being considered for ICD.  Cancel cath  Stop Plavix  Cont coreg  Add lisinopril, eventual aldact if BP will allow  Lifevest ordered  Will need repeat echo in 3 months.  If EF depressed despite GDMT will need ICD.    Chronic combined systolic and diastolic heart failure  Echo 10/2019 with EF 20% and ILP WMA and mild (?biscuspid) AS.  Etiology appears to be NICM per report from [facility] in [place], [place].   Pt appears euvolemic and asymptomatic from a CHF perspective (LOC is concerning for possible malig arrhythmia, but he's had multiple prior " syncopal episodes in the past with prodrome suggesting possible neurological focus, although seen by neuro with thought that LOC is cardiogenic).  Cont low dose coreg  Start low dose lisinopril  Cath cancelled  Lifevest ordered  Will need repeat echo in 3 months.  If EF depressed despite GDMT will need ICD.      PAF (paroxysmal atrial fibrillation)  Not clear the patient has PAF (currently in SR) as he describes a clot in his leg as the reason he was on eliquis.  No EKG documentation of AF in our records.  If he had AF, CHADSVASC 1.  Now off eliquis    Seizure disorder  Per neuro        VTE Risk Mitigation (From admission, onward)         Ordered     enoxaparin injection 40 mg  Daily      10/11/19 1209     IP VTE HIGH RISK PATIENT  Once      10/11/19 0857              Dispo planning appropriate    Official medical records requested from [facility], pt signed release.    Cardiology will sign off, pls call with questions.    Pt to follow up with [facility].    N.B.: pt was smoking in his bathroom.  Pt admonished.    Chang Lewis MD  Cardiology  [facility] [place]

## 2019-10-13 NOTE — SUBJECTIVE & OBJECTIVE
Interval History: No new issues. No syncope. Smoked in bathroom     Review of Systems   Constitutional: Negative for activity change.   HENT: Negative for congestion.    Respiratory: Negative for chest tightness and shortness of breath.    Cardiovascular: Negative for chest pain.   Gastrointestinal: Negative for abdominal pain.   Genitourinary: Negative for difficulty urinating.     Objective:     Vital Signs (Most Recent):  Temp: 97.9 °F (36.6 °C) (10/13/19 0722)  Pulse: (!) 58 (10/13/19 0722)  Resp: 18 (10/13/19 0722)  BP: (!) 140/68 (10/13/19 0722)  SpO2: 97 % (10/13/19 0722) Vital Signs (24h Range):  Temp:  [97.9 °F (36.6 °C)-98.1 °F (36.7 °C)] 97.9 °F (36.6 °C)  Pulse:  [58-67] 58  Resp:  [18] 18  SpO2:  [95 %-97 %] 97 %  BP: ()/(53-68) 140/68     Weight: 63.2 kg (139 lb 5.3 oz)  Body mass index is 19.43 kg/m².    Intake/Output Summary (Last 24 hours) at 10/13/2019 1039  Last data filed at 10/13/2019 0845  Gross per 24 hour   Intake 360 ml   Output 1300 ml   Net -940 ml      Physical Exam   Constitutional: He is oriented to person, place, and time. He appears well-developed and well-nourished.   HENT:   Head: Normocephalic and atraumatic.   Neurological: He is alert and oriented to person, place, and time.   Skin: Skin is warm and dry.   Psychiatric: He has a normal mood and affect. His behavior is normal.   Nursing note and vitals reviewed.      Significant Labs:   BMP:   Recent Labs   Lab 10/12/19  0553   GLU 92      K 4.1      CO2 28   BUN 19   CREATININE 0.7   CALCIUM 8.8   MG 1.9     CBC:   Recent Labs   Lab 10/12/19  0553   WBC 6.03   HGB 12.3*   HCT 38.3*          Significant Imaging

## 2019-10-13 NOTE — ASSESSMENT & PLAN NOTE
???seizure - denied prodrome for me - Cards noted he admitted prodrome for him???? Other options, alcohol intoxication - mechanical fall while intoxicated - Cardiac event/arrhythmia/syncope/hypovolemia  -consults to neurology and cardiology  -takes Donis, his brother prepares his medication at home; he is not sure what his medications are  -Heart failure history of afib    Doubt cardiac origin. Likely orthostatic from ETOH abuse   No further syncope

## 2019-10-13 NOTE — ASSESSMENT & PLAN NOTE
Pt reports prodrome prior to LOC, similar to multiple prior episodes.  No associated palps/CP.  No anginal sxs.  See by neuro and LOC not thought to be epileptogenic.  Cont tele monitoring.  No evidence of sam or tachyarrhythmia.  Echo with severe LV dysfxn and IPL WMA.  Pt now recalls cath last year at [facility]   Per their med recs, pt has NICM as was being considered for ICD.  Cancel cath  Stop Plavix  Cont coreg  Add lisinopril, eventual aldact if BP will allow  Lifevest ordered  Will need repeat echo in 3 months.  If EF depressed despite GDMT will need ICD.

## 2019-10-13 NOTE — PROGRESS NOTES
Patient will discharge with [employer] Life vest if approved; contacted [employer] and spoke with Eve about order and patient information faxed at 1037am so that they can monitor for it; plan is for patient to discharge today if he acquires Life Vest.     Review with Eve needed components to be faxed which were including in the 1037 fax; awaiting to hear back from Lauren about possible delivery today or in morning.

## 2019-10-14 LAB
ANION GAP SERPL CALC-SCNC: 6 MMOL/L (ref 8–16)
BUN SERPL-MCNC: 12 MG/DL (ref 6–20)
CALCIUM SERPL-MCNC: 8.9 MG/DL (ref 8.7–10.5)
CHLORIDE SERPL-SCNC: 106 MMOL/L (ref 95–110)
CO2 SERPL-SCNC: 28 MMOL/L (ref 23–29)
CREAT SERPL-MCNC: 0.7 MG/DL (ref 0.5–1.4)
EST. GFR  (AFRICAN AMERICAN): >60 ML/MIN/1.73 M^2
EST. GFR  (NON AFRICAN AMERICAN): >60 ML/MIN/1.73 M^2
GLUCOSE SERPL-MCNC: 83 MG/DL (ref 70–110)
POCT GLUCOSE: 55 MG/DL (ref 70–110)
POCT GLUCOSE: 57 MG/DL (ref 70–110)
POCT GLUCOSE: 73 MG/DL (ref 70–110)
POTASSIUM SERPL-SCNC: 4 MMOL/L (ref 3.5–5.1)
SODIUM SERPL-SCNC: 140 MMOL/L (ref 136–145)

## 2019-10-14 PROCEDURE — 25000003 PHARM REV CODE 250: Performed by: EMERGENCY MEDICINE

## 2019-10-14 PROCEDURE — 25000003 PHARM REV CODE 250: Performed by: INTERNAL MEDICINE

## 2019-10-14 PROCEDURE — 80048 BASIC METABOLIC PNL TOTAL CA: CPT

## 2019-10-14 PROCEDURE — 21400001 HC TELEMETRY ROOM

## 2019-10-14 PROCEDURE — 94761 N-INVAS EAR/PLS OXIMETRY MLT: CPT

## 2019-10-14 PROCEDURE — 63600175 PHARM REV CODE 636 W HCPCS: Performed by: NURSE PRACTITIONER

## 2019-10-14 PROCEDURE — 36415 COLL VENOUS BLD VENIPUNCTURE: CPT

## 2019-10-14 PROCEDURE — 25000003 PHARM REV CODE 250: Performed by: NURSE PRACTITIONER

## 2019-10-14 RX ORDER — ASPIRIN 81 MG/1
81 TABLET ORAL DAILY
Refills: 0 | Status: ON HOLD | COMMUNITY
Start: 2019-10-15 | End: 2023-02-01 | Stop reason: HOSPADM

## 2019-10-14 RX ORDER — LISINOPRIL 2.5 MG/1
2.5 TABLET ORAL DAILY
Qty: 30 TABLET | Refills: 5 | Status: ON HOLD | OUTPATIENT
Start: 2019-10-15 | End: 2023-02-01 | Stop reason: HOSPADM

## 2019-10-14 RX ORDER — CARVEDILOL 3.12 MG/1
3.12 TABLET ORAL 2 TIMES DAILY
Qty: 60 TABLET | Refills: 5 | OUTPATIENT
Start: 2019-10-14 | End: 2022-12-27

## 2019-10-14 RX ORDER — TAMSULOSIN HYDROCHLORIDE 0.4 MG/1
0.4 CAPSULE ORAL DAILY
Qty: 30 CAPSULE | Refills: 5 | OUTPATIENT
Start: 2019-10-15 | End: 2022-12-27

## 2019-10-14 RX ADMIN — FAMOTIDINE 20 MG: 20 TABLET ORAL at 08:10

## 2019-10-14 RX ADMIN — LEVETIRACETAM 1000 MG: 500 TABLET ORAL at 08:10

## 2019-10-14 RX ADMIN — TAMSULOSIN HYDROCHLORIDE 0.4 MG: 0.4 CAPSULE ORAL at 08:10

## 2019-10-14 RX ADMIN — LEVETIRACETAM 1000 MG: 500 TABLET ORAL at 09:10

## 2019-10-14 RX ADMIN — CARVEDILOL 3.12 MG: 3.12 TABLET, FILM COATED ORAL at 09:10

## 2019-10-14 RX ADMIN — FAMOTIDINE 20 MG: 20 TABLET ORAL at 09:10

## 2019-10-14 RX ADMIN — ASPIRIN 81 MG: 81 TABLET, COATED ORAL at 08:10

## 2019-10-14 RX ADMIN — ENOXAPARIN SODIUM 40 MG: 100 INJECTION SUBCUTANEOUS at 05:10

## 2019-10-14 RX ADMIN — LISINOPRIL 2.5 MG: 2.5 TABLET ORAL at 08:10

## 2019-10-14 RX ADMIN — DIAZEPAM 2 MG: 2 TABLET ORAL at 06:10

## 2019-10-14 RX ADMIN — CARVEDILOL 3.12 MG: 3.12 TABLET, FILM COATED ORAL at 08:10

## 2019-10-14 RX ADMIN — Medication 100 MG: at 08:10

## 2019-10-14 RX ADMIN — FOLIC ACID 1 MG: 1 TABLET ORAL at 08:10

## 2019-10-14 RX ADMIN — SENNOSIDES, DOCUSATE SODIUM 1 TABLET: 50; 8.6 TABLET, FILM COATED ORAL at 09:10

## 2019-10-14 RX ADMIN — SENNOSIDES, DOCUSATE SODIUM 1 TABLET: 50; 8.6 TABLET, FILM COATED ORAL at 08:10

## 2019-10-14 NOTE — DISCHARGE SUMMARY
"[facility] Medicine  Discharge Summary      Patient Name: Pierre Pierson  MRN: [id number]  Admission Date: 10/11/2019  Hospital Length of Stay: 3 days  Discharge Date and Time:  10/14/2019 11:20 AM  Attending Physician: Warner Ibarra MD   Discharging Provider: Warner Ibarra MD  Primary Care Provider: [facility]      HPI:   Pierre Pierson is a 59 y.o. WM patient with PMHx Fractured skull (~1989), non-specific seizure activity, afib (on dig), CHF (LVEF 25% + GII DD, 7/20/19), current everyday-every-other-day alcohol drinker who presented for evaluation of seizure activity and acute onset of hypotension with associated tachycardia. States after being kicked out of his house wher he lives with his brother, he walked up the road and "black-out" thinks he was out for about an hour, he awaken his phone had enough life where he could call 911. He tells me he did not have a prodrome prior to his syncope episode and did not loose bowel or bladder functions. He had a little charge on his phone after awakening and states he called EMS at that time.    He was found to be hypotensive by EMS and upon arrival to hospital BP was 92/50, respiration 28 and BP down to 82/55. He got 1/2 liter of IV fluids BP improved to 94/59; lactic acid 4.0; alcohol level 104; ABG shows metabolic acidosis        This is an acute management of hypotension in a patient with alcohol abuse, tachycardia, seizure and heart failure with LVEF 25%. He got one half liter of fluids and 187 of banana drinking total - He tells me that he has not urinated since yesterday 1 day prior to admit 10/10/19. He is alert and oriented and conversing without problems while at rest. He does exhibit rales posterior R upper lower lung fields - diminished L posterior - Sinus rhythm -->sinus tachycardia    1 pint about 3-4 times week; Last yesterday 11:30am  Brother prepares his medications daily bc he has some memory " loss 2/2 traumatic bran injury    Procedure(s) (LRB):  CATHETERIZATION, HEART, BOTH LEFT AND RIGHT not before 9am, RFA/V access, 4Fr art sheath (Bilateral)      Hospital Course:   Pierre Pierson is a 59 y.o. WM patient with PMHx Fractured skull (~1989), non-specific seizure activity, afib (on dig), CHF (LVEF 25% + GII DD, 7/20/19), current everyday-every-other-day alcohol drinker who presented for evaluation of seizure activity and acute onset of hypotension with associated tachycardia. Patient reportedly had a syncopal episode. Patient had prodrome so likely not cardiac. Cards and neuro were consulted.  Patient clinically improved. Patient had repeat echo and found to have an EF of 20%. Cards recommended a Life Vest before discharge. Once he has a life vest, he  can be discharged to home with follow up with [facility]. The patient received Life Vest on 10/14. He will be discharged to home. Activity as tolerated. Diet- low NA, Follow up at [facility].   Rx's sent to pharmacy     Consults:   Consults (From admission, onward)        Status Ordering Provider     Inpatient consult to Cardiology  Once     Provider:  Chang Lewis MD    Completed CARINA ALVAREZ     Inpatient consult to Neurology  Once     Provider:  Umang Maradiaga MD    Completed ALAINA KEENAN     Inpatient consult to Social Work  Once     Provider:  (Not yet assigned)    CHANG Peguero          No new Assessment & Plan notes have been filed under this hospital service since the last note was generated.  Service: Hospital Medicine    Final Active Diagnoses:    Diagnosis Date Noted POA    PRINCIPAL PROBLEM:  Syncope and collapse [R55] 10/11/2019 Yes    Hypotension [I95.9] 10/11/2019 Yes    Acidosis, metabolic [E87.2] 10/11/2019 Yes    Hypotension due to drugs [I95.2] 10/11/2019 Yes    ETOH abuse [F10.10] 10/11/2019 Yes    Chronic combined systolic and diastolic heart failure [I50.42] 10/11/2019 Yes    PAF  (paroxysmal atrial fibrillation) [I48.0] 07/07/2019 Yes    Seizure disorder [G40.909] 07/05/2019 Yes     Chronic      Problems Resolved During this Admission:       Discharged Condition: good    Disposition: Home or Self Care    Follow Up:  Follow-up Information     [facility] [place]. Schedule an appointment as soon as possible for a visit in 1 week.    Why:  Call on Monday to schedule hospital cardiology and primary care follow-up appointments and to establish medical care with    Contact information:  [address] [facility] [facility]  [place] [address]  [phone]             [employer] [facility].    Why:  DME: [facility] In 1 week.    Contact information:  [address]  [facility] [id number] [phone]                 Patient Instructions:   No discharge procedures on file.    Significant Diagnostic Studies:     Pending Diagnostic Studies:     Procedure Component Value Units Date/Time    EKG 12-lead [[id number]]     Order Status:  Sent Lab Status:  No result          Medications:  Reconciled Home Medications:      Medication List      START taking these medications    aspirin 81 MG EC tablet  Commonly known as:  ECOTRIN  Take 1 tablet (81 mg total) by mouth once daily.  Start taking on:  October 15, 2019     carvedilol 3.125 MG tablet  Commonly known as:  COREG  Take 1 tablet (3.125 mg total) by mouth 2 (two) times daily.     lisinopril 2.5 MG tablet  Commonly known as:  PRINIVIL,ZESTRIL  Take 1 tablet (2.5 mg total) by mouth once daily.  Start taking on:  October 15, 2019     tamsulosin 0.4 mg Cap  Commonly known as:  FLOMAX  Take 1 capsule (0.4 mg total) by mouth once daily.  Start taking on:  October 15, 2019        CONTINUE taking these medications    albuterol 1.25 mg/3 mL Nebu  Commonly known as:  ACCUNEB  Take 3 mLs (1.25 mg total) by nebulization every 6 (six) hours as needed. Rescue     digoxin 125 mcg tablet  Commonly known as:  LANOXIN  Take 1 tablet  (125 mcg total) by mouth once daily.     famotidine 20 MG tablet  Commonly known as:  PEPCID  Take 20 mg by mouth 2 (two) times daily.     levETIRAcetam 1000 MG tablet  Commonly known as:  KEPPRA  Take 1 tablet (1,000 mg total) by mouth 2 (two) times daily.     multivitamin per tablet  Commonly known as:  THERAGRAN  Take 1 tablet by mouth once daily.     spironolactone 25 MG tablet  Commonly known as:  ALDACTONE  Take 1 tablet (25 mg total) by mouth once daily.     umeclidinium-vilanterol 62.5-25 mcg/actuation Dsdv  Commonly known as:  ANORO ELLIPTA  Inhale 1 puff into the lungs once daily. Controller            Indwelling Lines/Drains at time of discharge:   Lines/Drains/Airways     None                 Time spent on the discharge of patient:  < 30 minutes  Patient was seen and examined on the date of discharge and determined to be suitable for discharge.         Warner Goodwin MD  Department of Hospital Medicine  [facility]

## 2019-10-14 NOTE — SUBJECTIVE & OBJECTIVE
Interval History: No new issues     Review of Systems   Constitutional: Negative for activity change.   HENT: Negative for congestion.    Respiratory: Negative for chest tightness and shortness of breath.    Cardiovascular: Negative for chest pain.   Gastrointestinal: Negative for abdominal pain.   Genitourinary: Negative for difficulty urinating.     Objective:     Vital Signs (Most Recent):  Temp: 97.6 °F (36.4 °C) (10/14/19 0720)  Pulse: 67 (10/14/19 0720)  Resp: 18 (10/14/19 0720)  BP: 106/74 (10/14/19 0720)  SpO2: 97 % (10/14/19 0720) Vital Signs (24h Range):  Temp:  [97.5 °F (36.4 °C)-98.6 °F (37 °C)] 97.6 °F (36.4 °C)  Pulse:  [64-84] 67  Resp:  [18] 18  SpO2:  [95 %-99 %] 97 %  BP: (106-118)/(63-79) 106/74     Weight: 63.2 kg (139 lb 5.3 oz)  Body mass index is 19.43 kg/m².    Intake/Output Summary (Last 24 hours) at 10/14/2019 1041  Last data filed at 10/13/2019 1700  Gross per 24 hour   Intake 360 ml   Output 200 ml   Net 160 ml      Physical Exam   Constitutional: He is oriented to person, place, and time. He appears well-developed and well-nourished.   HENT:   Head: Normocephalic and atraumatic.   Neurological: He is alert and oriented to person, place, and time.   Skin: Skin is warm and dry.   Psychiatric: He has a normal mood and affect. His behavior is normal.   Nursing note and vitals reviewed.      Significant Labs:   BMP:   Recent Labs   Lab 10/14/19  0416   GLU 83      K 4.0      CO2 28   BUN 12   CREATININE 0.7   CALCIUM 8.9     CBC: No results for input(s): WBC, HGB, HCT, PLT in the last 48 hours.    Significant Imaging:

## 2019-10-14 NOTE — PROGRESS NOTES
"[facility] Medicine  Progress Note    Patient Name: Pierre Pierson  MRN: [id number]  Patient Class: IP- Inpatient   Admission Date: 10/11/2019  Length of Stay: 3 days  Attending Physician: Warner Ibarra MD  Primary Care Provider: [facility]        Subjective:     Principal Problem:Syncope and collapse        HPI:  Pierre Pierson is a 59 y.o. WM patient with PMHx Fractured skull (~1989), non-specific seizure activity, afib (on dig), CHF (LVEF 25% + GII DD, 7/20/19), current everyday-every-other-day alcohol drinker who presented for evaluation of seizure activity and acute onset of hypotension with associated tachycardia. States after being kicked out of his house wher he lives with his brother, he walked up the road and "black-out" thinks he was out for about an hour, he awaken his phone had enough life where he could call 911. He tells me he did not have a prodrome prior to his syncope episode and did not loose bowel or bladder functions. He had a little charge on his phone after awakening and states he called EMS at that time.    He was found to be hypotensive by EMS and upon arrival to hospital BP was 92/50, respiration 28 and BP down to 82/55. He got 1/2 liter of IV fluids BP improved to 94/59; lactic acid 4.0; alcohol level 104; ABG shows metabolic acidosis        This is an acute management of hypotension in a patient with alcohol abuse, tachycardia, seizure and heart failure with LVEF 25%. He got one half liter of fluids and 187 of banana drinking total - He tells me that he has not urinated since yesterday 1 day prior to admit 10/10/19. He is alert and oriented and conversing without problems while at rest. He does exhibit rales posterior R upper lower lung fields - diminished L posterior - Sinus rhythm -->sinus tachycardia    1 pint about 3-4 times week; Last yesterday 11:30am  Brother prepares his medications daily bc he has some memory loss 2/2 " traumatic bran injury    Overview/Hospital Course:  Pierre Pierson is a 59 y.o. WM patient with PMHx Fractured skull (~1989), non-specific seizure activity, afib (on dig), CHF (LVEF 25% + GII DD, 7/20/19), current everyday-every-other-day alcohol drinker who presented for evaluation of seizure activity and acute onset of hypotension with associated tachycardia. Patient reportedly had a syncopal episode. Patient had prodrome so likely not cardiac. Cards and neuro were consulted.  Patient clinically improved. Patient had repeat echo and found to have an EF of 20%. Cards recommended a Life Vest before discharge. Once he has a life vest, he  can be discharged to home with follow up with [facility].     Interval History: No new issues     Review of Systems   Constitutional: Negative for activity change.   HENT: Negative for congestion.    Respiratory: Negative for chest tightness and shortness of breath.    Cardiovascular: Negative for chest pain.   Gastrointestinal: Negative for abdominal pain.   Genitourinary: Negative for difficulty urinating.     Objective:     Vital Signs (Most Recent):  Temp: 97.6 °F (36.4 °C) (10/14/19 0720)  Pulse: 67 (10/14/19 0720)  Resp: 18 (10/14/19 0720)  BP: 106/74 (10/14/19 0720)  SpO2: 97 % (10/14/19 0720) Vital Signs (24h Range):  Temp:  [97.5 °F (36.4 °C)-98.6 °F (37 °C)] 97.6 °F (36.4 °C)  Pulse:  [64-84] 67  Resp:  [18] 18  SpO2:  [95 %-99 %] 97 %  BP: (106-118)/(63-79) 106/74     Weight: 63.2 kg (139 lb 5.3 oz)  Body mass index is 19.43 kg/m².    Intake/Output Summary (Last 24 hours) at 10/14/2019 1041  Last data filed at 10/13/2019 1700  Gross per 24 hour   Intake 360 ml   Output 200 ml   Net 160 ml      Physical Exam   Constitutional: He is oriented to person, place, and time. He appears well-developed and well-nourished.   HENT:   Head: Normocephalic and atraumatic.   Neurological: He is alert and oriented to person, place, and time.   Skin: Skin is warm and dry.    Psychiatric: He has a normal mood and affect. His behavior is normal.   Nursing note and vitals reviewed.      Significant Labs:   BMP:   Recent Labs   Lab 10/14/19  0416   GLU 83      K 4.0      CO2 28   BUN 12   CREATININE 0.7   CALCIUM 8.9     CBC: No results for input(s): WBC, HGB, HCT, PLT in the last 48 hours.    Significant Imaging:       Assessment/Plan:      * Syncope and collapse  ???seizure - denied prodrome for me - Cards noted he admitted prodrome for him???? Other options, alcohol intoxication - mechanical fall while intoxicated - Cardiac event/arrhythmia/syncope/hypovolemia  -consults to neurology and cardiology  -takes Donis, his brother prepares his medication at home; he is not sure what his medications are  -Heart failure history of afib    Doubt cardiac origin. Likely orthostatic from ETOH abuse   No further syncope     Chronic combined systolic and diastolic heart failure  Patient with known heart failure, last 2D echo showed 25% LVEF with grade 2 diastolic dysfunction and some atrial enlargement, 07/07/2019  -discontinue IV fluids for now  -monitor patient's renal functions closely    Repeat echo now shows EF of 20%. Will let cards comment on any further recs.     ETOH abuse  Alcohol consumption and abuse likely contributory to the patient's LOC as he denies having a prodrome or loss of bowel or bladder functions post loss of consciousness.  His alcohol level was greater than 100 and lactic acid was 4 at the time of presentation; alcohol intoxication vs seizure vs arrythmia?? (Hx afib/heart failure)  -neuro checks  -monitor closely  -diazepam 2 mg q.8 hours DT prophylaxis      Hypotension due to drugs  Patient's blood pressure was noted to be in the 80s at the time of presentation with tachycardia as high as 113 and respiratory rate 28 - his pressure did improve with a bolus of fluid administered in the emergency room for hypotension; fluids were discontinued given his heart  failure 25% and rales on auscultation posteriorly.  -monitor blood pressure closely  -insert Barboza catheter  -strict I&Os  -encourage p.o. intake    No need for barboza     Acidosis, metabolic  Patient reports loss of consciousness for about an hour, he has some traumatic brain injury and is reporting a history of seizure, appears to be reliable historian, he was found on ABG to be acidotic pH 7.30 for an bicarb 21.2, serum CO2 = 20, and anion gap of 17,  and lactic vein acid of 4.0, with alcohol level of 104.  His tox screen negative for drugs of abuse; was also found to have blood pressure to be in the 80s at the time of presentation with tachycardia as high as 113 and respiratory rate 28  -patient is a challenge to rehydrate as he has LVEF 25% and grade 2 diastolic dysfunction  -he is alert and appropriate at this time  -he got an unspecific amount fluids on EMS and a half Liter normal saline at the time presentation to the emergency room.  Patient also received 150 cc a banana bag  -will DC fluids for now to prevent fluid volume overload  -add supplemental oxygen 2 L  -repeat lactic acid in am    Hypotension  Likely from dehydration from ETOH abuse       PAF (paroxysmal atrial fibrillation)  Patient has history of chronic atrial fib takes digoxin and digoxin level was found to be low = 0.2 at the time of presentation.  He was noted to be in sinus rhythm  -restart day as daily  -EKG  -chest 2 score = 3 (hypertension, heart failure) does not appear that he is on oral anticoagulation will consult Cardiology for opinion  -Lovenox while admitted  -inpatient consult Cardiology      Seizure disorder  Although the patient has a history of seizure disorder I doubt seizure as cause of his loss of consciousness as he denied a prodrome for me in early morning - he has however admitted having prodrome in the hours since his initial assessment - and did not have bowel or bladder function loss.  Patient also did not report a  postictal period.  His LOC likely related to alcohol use and intoxication if not entirely, at least contributory ??alcoholic seizures?? restart patient's home dose of Keppra and monitor closely  -seizure precaution  -Neuro assessment    Not known if actual seizure- but being treated as such.         VTE Risk Mitigation (From admission, onward)         Ordered     enoxaparin injection 40 mg  Daily      10/11/19 1209     IP VTE HIGH RISK PATIENT  Once      10/11/19 0857              Awaiting life vest. Will d/c to home once arranged.           Warner Goodwin MD  Department of Hospital Medicine   [facility]

## 2019-10-14 NOTE — PROGRESS NOTES
CHARO contacted [facility] to check bed availability. CHARO spoke with Nithya Linda and was informed a bed is available.Mr Mckeon stated he would like to communicate with pt  regarding placement. CHARO provided pt with information to start intake process. Charo will follow up with pt tomorrow.

## 2019-10-14 NOTE — PLAN OF CARE
JAS contacted [employer] to check status . JAS spoke with Adriana from [employer] and she stated pt has been approved and need to be fit for the vest. Adriana informed JAS that JT from [employer] will fit pt for vest.     JAS received call from JT with [employer] and he informed JAS, he will fit pt for vest today.       10/14/19 0943   Post-Acute Status   Post-Acute Authorization Other  ([employer])   HME Status Set-up Complete   Other Status No Post-Acute Service Needs   Discharge Delays None known at this time

## 2019-10-14 NOTE — PROGRESS NOTES
Patient had no complaints of pain throughout the shift; patient ambulated in room and stood in doorway for several hours during shift, making jokes and conversation.  Patient asked for a cigarette break (refused) and was educated on risks of smoking; patient refused nicotine patch, stating it does nothing for him.  Shift report given to oncoming RN.

## 2019-10-14 NOTE — PROGRESS NOTES
"SW spoke with pt regarding discharge. Pt advised SW "he is unable to return to his previous living arrangements, due to an disagreement with his brother who he lives with". JAS spoke with pt in regards to a shelter, but it was unsuccessfully due to patient having an life vest. SW spoke with doctor concerning possibly  waiting to d/c patient until suitable placement is secured.    "

## 2019-10-15 VITALS
BODY MASS INDEX: 20.74 KG/M2 | HEART RATE: 80 BPM | OXYGEN SATURATION: 93 % | WEIGHT: 148.13 LBS | HEIGHT: 71 IN | DIASTOLIC BLOOD PRESSURE: 74 MMHG | RESPIRATION RATE: 18 BRPM | SYSTOLIC BLOOD PRESSURE: 141 MMHG | TEMPERATURE: 99 F

## 2019-10-15 PROBLEM — J96.02 ACUTE HYPERCAPNIC RESPIRATORY FAILURE: Status: RESOLVED | Noted: 2019-07-05 | Resolved: 2019-10-15

## 2019-10-15 PROBLEM — E87.20 ACIDOSIS, METABOLIC: Status: RESOLVED | Noted: 2019-10-11 | Resolved: 2019-10-15

## 2019-10-15 PROBLEM — D64.9 ANEMIA: Status: ACTIVE | Noted: 2019-10-15

## 2019-10-15 LAB
CORTIS SERPL-MCNC: 11.6 UG/DL
POCT GLUCOSE: 104 MG/DL (ref 70–110)
POCT GLUCOSE: 95 MG/DL (ref 70–110)
POCT GLUCOSE: 97 MG/DL (ref 70–110)

## 2019-10-15 PROCEDURE — 25000003 PHARM REV CODE 250: Performed by: INTERNAL MEDICINE

## 2019-10-15 PROCEDURE — 90686 IIV4 VACC NO PRSV 0.5 ML IM: CPT | Performed by: HOSPITALIST

## 2019-10-15 PROCEDURE — 90472 IMMUNIZATION ADMIN EACH ADD: CPT | Performed by: HOSPITALIST

## 2019-10-15 PROCEDURE — 82533 TOTAL CORTISOL: CPT

## 2019-10-15 PROCEDURE — 63600175 PHARM REV CODE 636 W HCPCS: Performed by: HOSPITALIST

## 2019-10-15 PROCEDURE — 36415 COLL VENOUS BLD VENIPUNCTURE: CPT

## 2019-10-15 PROCEDURE — 25000003 PHARM REV CODE 250: Performed by: EMERGENCY MEDICINE

## 2019-10-15 PROCEDURE — 90471 IMMUNIZATION ADMIN: CPT | Performed by: HOSPITALIST

## 2019-10-15 PROCEDURE — 25000003 PHARM REV CODE 250: Performed by: NURSE PRACTITIONER

## 2019-10-15 PROCEDURE — 90670 PCV13 VACCINE IM: CPT | Performed by: HOSPITALIST

## 2019-10-15 RX ORDER — FOLIC ACID 1 MG/1
1 TABLET ORAL DAILY
Qty: 360 TABLET | Refills: 0 | Status: SHIPPED | OUTPATIENT
Start: 2019-10-15 | End: 2021-01-11 | Stop reason: SDUPTHER

## 2019-10-15 RX ORDER — LEVETIRACETAM 1000 MG/1
1000 TABLET ORAL 2 TIMES DAILY
Qty: 60 TABLET | Refills: 11 | Status: SHIPPED | OUTPATIENT
Start: 2019-10-15 | End: 2021-01-11 | Stop reason: SDUPTHER

## 2019-10-15 RX ORDER — LANOLIN ALCOHOL/MO/W.PET/CERES
100 CREAM (GRAM) TOPICAL DAILY
COMMUNITY
Start: 2019-10-15 | End: 2021-01-11

## 2019-10-15 RX ADMIN — FOLIC ACID 1 MG: 1 TABLET ORAL at 09:10

## 2019-10-15 RX ADMIN — Medication 100 MG: at 08:10

## 2019-10-15 RX ADMIN — PNEUMOCOCCAL 13-VALENT CONJUGATE VACCINE 0.5 ML: 2.2; 2.2; 2.2; 2.2; 2.2; 4.4; 2.2; 2.2; 2.2; 2.2; 2.2; 2.2; 2.2 INJECTION, SUSPENSION INTRAMUSCULAR at 11:10

## 2019-10-15 RX ADMIN — TAMSULOSIN HYDROCHLORIDE 0.4 MG: 0.4 CAPSULE ORAL at 08:10

## 2019-10-15 RX ADMIN — LEVETIRACETAM 1000 MG: 500 TABLET ORAL at 08:10

## 2019-10-15 RX ADMIN — ASPIRIN 81 MG: 81 TABLET, COATED ORAL at 08:10

## 2019-10-15 RX ADMIN — CARVEDILOL 3.12 MG: 3.12 TABLET, FILM COATED ORAL at 08:10

## 2019-10-15 RX ADMIN — SENNOSIDES, DOCUSATE SODIUM 1 TABLET: 50; 8.6 TABLET, FILM COATED ORAL at 08:10

## 2019-10-15 RX ADMIN — INFLUENZA VIRUS VACCINE 0.5 ML: 15; 15; 15; 15 SUSPENSION INTRAMUSCULAR at 11:10

## 2019-10-15 NOTE — PLAN OF CARE
"EDUCATION:  Pt provided with educational information on Syncope.  Information reviewed and placed in :My Healthcare Packet" to be brought home for  use as resource after discharge.  Information included:  signs and symptoms to look for at discharge. SW instructed pt to call the doctor if experiencing symptoms that may indicate a medical emergency: CALL 911 if signs and symptoms worsen. SW asked pt to provide SW with two signs and symptoms recently discussed.  Pt stated, " I will pay attention to when I get dizzy".     Reminded pt things he will be responsible for to manage his healthcare at home: getting Rx filled, attending follow up appointments, and taking medication as prescribed were discussed.   Teach back method used.  All questions answered.  Patient verbalized understanding of all information.  SW notified pt's nurse, Jodi, all CM needs have been met.       10/15/19 1049   Final Note   Assessment Type Final Discharge Note   Anticipated Discharge Disposition Int Care Fac  ([address]  [facility] )   Hospital Follow Up  Appt(s) scheduled? Yes   Discharge plans and expectations educations in teach back method with documentation complete? Yes     "

## 2019-10-15 NOTE — PROGRESS NOTES
ADT 30 order placed for  Transportation.  ETA:12:00  If transportation does not arrive at ETA time nurse will be instructed to follow protocol for transportation below:  How can I get in touch directly with dispatch, if needed?                 Non-emergent dispatch: [phone]                                    Emergent dispatch: [phone]  Non-emergent (stretcher): [phone]  Escalation Needs (PFC Lead): [phone]

## 2019-10-15 NOTE — NURSING
Report given to Jodi DONIS. Pt resting comfortably, eyes closed. No acute distress noted. Safety precautions maintained.     Chart check completed.

## 2019-10-15 NOTE — CONSULTS
Follow up appointment with Cardiology at [facility], schedule on October 25 @9:00

## 2019-10-15 NOTE — NURSING
Written and verbal discharge instructions given. Patient verbalized understanding. Transport here for discharge. To [address].

## 2019-10-15 NOTE — PROGRESS NOTES
Follow-up Information     [facility].    Why:  DME: Life Vest           [facility]    Contact information:  [address]  [place] [address]  [phone]             [facility] - C[facility] Malone On 10/25/2019.    Why:  Outpatient Services, hospital follow upOutpatient Services, hospital follow up appointment  Contact information:  [address] [facility] [address] [facility] [address]  [phone]                         [facility] CASE MANAGEMENT                  WRITTEN DISCHARGE INFORMATION      APPOINTMENTS AND RESOURCES TO HELP YOU MANAGE YOUR CARE AT HOME BASED ON YOUR PREFERENCES:  (If an appointment is not scheduled for you when you leave the hospital, call your doctor to schedule a follow up visit within a week)        Healthy Living Instructions to HELP MANAGE YOUR CARE AT HOME:  Things You are responsible for:  1.    Getting your prescriptions filled   2.    Taking your medications as directed, DO NOT MISS ANY DOSES!  3.    Following the diet and exercise recommended by your doctor  4.    Going to your follow-up doctor appointment. This is important because it allows the doctor to monitor your progress and determine if any changes need to made to your treatment plan.  5. If you have any questions about MANAGING YOUR CARE AT HOME Call the Nurse Care Line for 24/7 Assistance [phone]       Please answer any calls you may receive from [facility]. We want to continue to support you as you manage your healthcare needs. [facility] is happy to have the opportunity to serve you.      Thank you for choosing [facility] for your healthcare needs!  Your [facility] Case Management Team,    PAT Dos Santos    [phone]

## 2019-10-15 NOTE — PLAN OF CARE
JAS spoke with Mr Mcke[facility] from [facility] regarding status of  acceptance. Mr Mckeon informed JAS pt has been accepted and he will notify manager to arrange intake.  According to Mr Mckeon address to [place] is [address] [place] [address].     10/15/19 0938   Post-Acute Status   Post-Acute Authorization Placement   Post-Acute Placement Status Set-up Complete   Other Status No Post-Acute Service Needs   Discharge Delays None known at this time

## 2019-10-15 NOTE — DISCHARGE SUMMARY
"[facility] Medicine  Discharge Summary      Patient Name: Pierre Pierson  MRN: [id number]  Admission Date: 10/11/2019  Hospital Length of Stay: 4 days  Discharge Date and Time:  10/15/2019 11:55 AM  Attending Physician: Tana Carpio MD   Discharging Provider: Tana Carpio MD  Primary Care Provider: [facility]      HPI:   Pierre Pierson is a 59 y.o. WM patient with PMHx Fractured skull (~1989), non-specific seizure activity, afib (on dig), CHF (LVEF 25% + GII DD, 7/20/19), current everyday-every-other-day alcohol drinker who presented for evaluation of seizure activity and acute onset of hypotension with associated tachycardia. States after being kicked out of his house wher he lives with his brother, he walked up the road and "black-out" thinks he was out for about an hour, he awaken his phone had enough life where he could call 911. He tells me he did not have a prodrome prior to his syncope episode and did not loose bowel or bladder functions. He had a little charge on his phone after awakening and states he called EMS at that time.    He was found to be hypotensive by EMS and upon arrival to hospital BP was 92/50, respiration 28 and BP down to 82/55. He got 1/2 liter of IV fluids BP improved to 94/59; lactic acid 4.0; alcohol level 104; ABG shows metabolic acidosis        This is an acute management of hypotension in a patient with alcohol abuse, tachycardia, seizure and heart failure with LVEF 25%. He got one half liter of fluids and 187 of banana drinking total - He tells me that he has not urinated since yesterday 1 day prior to admit 10/10/19. He is alert and oriented and conversing without problems while at rest. He does exhibit rales posterior R upper lower lung fields - diminished L posterior - Sinus rhythm -->sinus tachycardia    1 pint about 3-4 times week; Last yesterday 11:30am  Brother prepares his medications daily bc he has some memory loss " 2/2 traumatic bran injury    Procedure(s) (LRB):  CATHETERIZATION, HEART, BOTH LEFT AND RIGHT not before 9am, RFA/V access, 4Fr art sheath (Bilateral)      Hospital Course:   Pierre Pierson is a 59 y.o. WM patient with PMHx Fractured skull (~1989), non-specific seizure activity, afib (on dig), CHF (LVEF 25% + GII DD, 7/20/19), current everyday-every-other-day alcohol drinker who presented for evaluation of seizure activity and acute onset of hypotension with associated tachycardia. Patient reportedly had a syncopal episode. Cards and neuro were consulted.  Patient clinically improved. Patient had repeat echo and found to have an EF of 20%. Cards recommended a Life Vest before discharge in case syncope was due to cardiac event. Patient received life vest. Medications adjusted- see discharge med rec. He was discharged to [address].      Consults:   Consults (From admission, onward)        Status Ordering Provider     Inpatient consult to Cardiology  Once     Provider:  Chang Danielle MD    Completed CARINA ALVAREZ     Inpatient consult to Neurology  Once     Provider:  Umang Maradiaga MD    Completed ALAINA KEENAN     Inpatient consult to Social Work  Once     Provider:  (Not yet assigned)    Completed CHANG DANIELLE          No new Assessment & Plan notes have been filed under this hospital service since the last note was generated.  Service: Hospital Medicine    Final Active Diagnoses:    Diagnosis Date Noted POA    PRINCIPAL PROBLEM:  Syncope and collapse [R55] 10/11/2019 Yes    Anemia [D64.9] 10/15/2019 Yes    Hypotension [I95.9] 10/11/2019 Yes    Hypotension due to drugs [I95.2] 10/11/2019 Yes    ETOH abuse [F10.10] 10/11/2019 Yes    Chronic combined systolic and diastolic heart failure [I50.42] 10/11/2019 Yes    PAF (paroxysmal atrial fibrillation) [I48.0] 07/07/2019 Yes    Seizure disorder [G40.909] 07/05/2019 Yes     Chronic    Tobacco abuse [Z72.0] 07/05/2019 Yes      Chronic      Problems Resolved During this Admission:    Diagnosis Date Noted Date Resolved POA    Acidosis, metabolic [E87.2] 10/11/2019 10/15/2019 Yes       Discharged Condition: good    Disposition: Home or Self Care    Follow Up:  Follow-up Information     [facility] DEF[facility]LL[facility] 1.    Why:  DME: Life Vest           [facility] In 1 week.    Contact information:  [address] [address]  [phone]             [facility] [place] - [facility]one On 10/25/2019.    Why:  Outpatient Services, hospital follow upOutpatient Services, hospital follow up appointment  Contact information:  [address] [facility] [address]  [phone]                 Patient Instructions:      Diet Cardiac     Notify your health care provider if you experience any of the following:  temperature >100.4     Notify your health care provider if you experience any of the following:  persistent nausea and vomiting or diarrhea     Notify your health care provider if you experience any of the following:  severe uncontrolled pain     Notify your health care provider if you experience any of the following:  redness, tenderness, or signs of infection (pain, swelling, redness, odor or green/yellow discharge around incision site)     Notify your health care provider if you experience any of the following:  difficulty breathing or increased cough     Notify your health care provider if you experience any of the following:  severe persistent headache     Notify your health care provider if you experience any of the following:  worsening rash     Notify your health care provider if you experience any of the following:  persistent dizziness, light-headedness, or visual disturbances     Notify your health care provider if you experience any of the following:  increased confusion or weakness     Activity as tolerated       Significant Diagnostic Studies: Labs: All labs within the past 24 hours have been  reviewed    Pending Diagnostic Studies:     Procedure Component Value Units Date/Time    EKG 12-lead [[id number]]     Order Status:  Sent Lab Status:  No result          Medications:  Reconciled Home Medications:      Medication List      START taking these medications    aspirin 81 MG EC tablet  Commonly known as:  ECOTRIN  Take 1 tablet (81 mg total) by mouth once daily.     carvedilol 3.125 MG tablet  Commonly known as:  COREG  Take 1 tablet (3.125 mg total) by mouth 2 (two) times daily.     folic acid 1 MG tablet  Commonly known as:  FOLVITE  Take 1 tablet (1 mg total) by mouth once daily.     lisinopril 2.5 MG tablet  Commonly known as:  PRINIVIL,ZESTRIL  Take 1 tablet (2.5 mg total) by mouth once daily.     tamsulosin 0.4 mg Cap  Commonly known as:  FLOMAX  Take 1 capsule (0.4 mg total) by mouth once daily.     thiamine 100 MG tablet  Take 1 tablet (100 mg total) by mouth once daily.        CONTINUE taking these medications    albuterol 1.25 mg/3 mL Nebu  Commonly known as:  ACCUNEB  Take 3 mLs (1.25 mg total) by nebulization every 6 (six) hours as needed. Rescue     levETIRAcetam 1000 MG tablet  Commonly known as:  KEPPRA  Take 1 tablet (1,000 mg total) by mouth 2 (two) times daily.     multivitamin per tablet  Commonly known as:  THERAGRAN  Take 1 tablet by mouth once daily.     umeclidinium-vilanterol 62.5-25 mcg/actuation Dsdv  Commonly known as:  ANORO ELLIPTA  Inhale 1 puff into the lungs once daily. Controller        STOP taking these medications    digoxin 125 mcg tablet  Commonly known as:  LANOXIN     famotidine 20 MG tablet  Commonly known as:  PEPCID     spironolactone 25 MG tablet  Commonly known as:  ALDACTONE            Indwelling Lines/Drains at time of discharge:   Lines/Drains/Airways     None                 Time spent on the discharge of patient: 35 minutes  Patient was seen and examined on the date of discharge and determined to be suitable for discharge.         Tana Carpio  MD  Department of Hospital Medicine  [facility]

## 2019-10-15 NOTE — PLAN OF CARE
Problem: Adult Inpatient Plan of Care  Goal: Plan of Care Review  Outcome: Ongoing, Progressing  Goal: Patient-Specific Goal (Individualization)  Outcome: Ongoing, Progressing  Goal: Absence of Hospital-Acquired Illness or Injury  Outcome: Ongoing, Progressing  Goal: Optimal Comfort and Wellbeing  Outcome: Ongoing, Progressing  Goal: Readiness for Transition of Care  Outcome: Ongoing, Progressing  Goal: Rounds/Family Conference  Outcome: Ongoing, Progressing     Problem: Skin Injury Risk Increased  Goal: Skin Health and Integrity  Outcome: Ongoing, Progressing     Problem: Fall Injury Risk  Goal: Absence of Fall and Fall-Related Injury  Outcome: Ongoing, Progressing

## 2021-01-11 ENCOUNTER — HOSPITAL ENCOUNTER (EMERGENCY)
Facility: HOSPITAL | Age: 61
Discharge: HOME | End: 2021-01-11
Attending: EMERGENCY MEDICINE
Payer: MEDICAID

## 2021-01-11 VITALS
HEART RATE: 66 BPM | DIASTOLIC BLOOD PRESSURE: 66 MMHG | WEIGHT: 148 LBS | RESPIRATION RATE: 18 BRPM | SYSTOLIC BLOOD PRESSURE: 120 MMHG | OXYGEN SATURATION: 97 % | BODY MASS INDEX: 20.72 KG/M2 | HEIGHT: 71 IN | TEMPERATURE: 98 F

## 2021-01-11 DIAGNOSIS — R55 SYNCOPE, UNSPECIFIED SYNCOPE TYPE: ICD-10-CM

## 2021-01-11 DIAGNOSIS — R56.9 SEIZURES: Primary | ICD-10-CM

## 2021-01-11 DIAGNOSIS — E86.0 DEHYDRATION: ICD-10-CM

## 2021-01-11 DIAGNOSIS — R55 SYNCOPE AND COLLAPSE: ICD-10-CM

## 2021-01-11 LAB
ALBUMIN SERPL BCP-MCNC: 4.9 G/DL (ref 3.5–5.2)
ALP SERPL-CCNC: 86 U/L (ref 55–135)
ALT SERPL W/O P-5'-P-CCNC: 23 U/L (ref 10–44)
AMPHET+METHAMPHET UR QL: NEGATIVE
ANION GAP SERPL CALC-SCNC: 15 MMOL/L (ref 8–16)
APAP SERPL-MCNC: <3 UG/ML (ref 10–20)
APTT BLDCRRT: 27.5 SEC (ref 21–32)
AST SERPL-CCNC: 31 U/L (ref 10–40)
BARBITURATES UR QL SCN>200 NG/ML: NEGATIVE
BASOPHILS # BLD AUTO: 0.04 K/UL (ref 0–0.2)
BASOPHILS NFR BLD: 0.5 % (ref 0–1.9)
BENZODIAZ UR QL SCN>200 NG/ML: NEGATIVE
BILIRUB SERPL-MCNC: 1.5 MG/DL (ref 0.1–1)
BILIRUB UR QL STRIP: NEGATIVE
BNP SERPL-MCNC: 97 PG/ML (ref 0–99)
BUN SERPL-MCNC: 27 MG/DL (ref 6–20)
BZE UR QL SCN: NEGATIVE
CALCIUM SERPL-MCNC: 10.1 MG/DL (ref 8.7–10.5)
CANNABINOIDS UR QL SCN: NORMAL
CHLORIDE SERPL-SCNC: 99 MMOL/L (ref 95–110)
CLARITY UR: CLEAR
CO2 SERPL-SCNC: 26 MMOL/L (ref 23–29)
COLOR UR: YELLOW
CREAT SERPL-MCNC: 1.2 MG/DL (ref 0.5–1.4)
CREAT UR-MCNC: 166.7 MG/DL (ref 23–375)
CTP QC/QA: YES
DIFFERENTIAL METHOD: ABNORMAL
EOSINOPHIL # BLD AUTO: 0 K/UL (ref 0–0.5)
EOSINOPHIL NFR BLD: 0.1 % (ref 0–8)
ERYTHROCYTE [DISTWIDTH] IN BLOOD BY AUTOMATED COUNT: 13.1 % (ref 11.5–14.5)
EST. GFR  (AFRICAN AMERICAN): >60 ML/MIN/1.73 M^2
EST. GFR  (NON AFRICAN AMERICAN): >60 ML/MIN/1.73 M^2
ETHANOL SERPL-MCNC: <10 MG/DL
GLUCOSE SERPL-MCNC: 104 MG/DL (ref 70–110)
GLUCOSE UR QL STRIP: NEGATIVE
HCT VFR BLD AUTO: 47.9 % (ref 40–54)
HGB BLD-MCNC: 15.8 G/DL (ref 14–18)
HGB UR QL STRIP: NEGATIVE
IMM GRANULOCYTES # BLD AUTO: 0.03 K/UL (ref 0–0.04)
IMM GRANULOCYTES NFR BLD AUTO: 0.4 % (ref 0–0.5)
INR PPP: 1.1 (ref 0.8–1.2)
KETONES UR QL STRIP: ABNORMAL
LEUKOCYTE ESTERASE UR QL STRIP: NEGATIVE
LYMPHOCYTES # BLD AUTO: 1 K/UL (ref 1–4.8)
LYMPHOCYTES NFR BLD: 12 % (ref 18–48)
MAGNESIUM SERPL-MCNC: 2 MG/DL (ref 1.6–2.6)
MCH RBC QN AUTO: 31.2 PG (ref 27–31)
MCHC RBC AUTO-ENTMCNC: 33 G/DL (ref 32–36)
MCV RBC AUTO: 95 FL (ref 82–98)
METHADONE UR QL SCN>300 NG/ML: NEGATIVE
MONOCYTES # BLD AUTO: 0.8 K/UL (ref 0.3–1)
MONOCYTES NFR BLD: 9.1 % (ref 4–15)
NEUTROPHILS # BLD AUTO: 6.6 K/UL (ref 1.8–7.7)
NEUTROPHILS NFR BLD: 77.9 % (ref 38–73)
NITRITE UR QL STRIP: NEGATIVE
NRBC BLD-RTO: 0 /100 WBC
OPIATES UR QL SCN: NEGATIVE
PCP UR QL SCN>25 NG/ML: NEGATIVE
PH UR STRIP: 6 [PH] (ref 5–8)
PLATELET # BLD AUTO: 221 K/UL (ref 150–350)
PMV BLD AUTO: 9.7 FL (ref 9.2–12.9)
POCT GLUCOSE: 113 MG/DL (ref 70–110)
POTASSIUM SERPL-SCNC: 4.4 MMOL/L (ref 3.5–5.1)
PROT SERPL-MCNC: 8.5 G/DL (ref 6–8.4)
PROT UR QL STRIP: ABNORMAL
PROTHROMBIN TIME: 11.2 SEC (ref 9–12.5)
RBC # BLD AUTO: 5.07 M/UL (ref 4.6–6.2)
SALICYLATES SERPL-MCNC: <5 MG/DL (ref 15–30)
SARS-COV-2 RDRP RESP QL NAA+PROBE: NEGATIVE
SODIUM SERPL-SCNC: 140 MMOL/L (ref 136–145)
SP GR UR STRIP: 1.02 (ref 1–1.03)
TOXICOLOGY INFORMATION: NORMAL
TROPONIN I SERPL DL<=0.01 NG/ML-MCNC: 0.02 NG/ML (ref 0–0.03)
TSH SERPL DL<=0.005 MIU/L-ACNC: 1.15 UIU/ML (ref 0.4–4)
URN SPEC COLLECT METH UR: ABNORMAL
UROBILINOGEN UR STRIP-ACNC: ABNORMAL EU/DL
WBC # BLD AUTO: 8.42 K/UL (ref 3.9–12.7)

## 2021-01-11 PROCEDURE — 82962 GLUCOSE BLOOD TEST: CPT

## 2021-01-11 PROCEDURE — 63600175 PHARM REV CODE 636 W HCPCS: Performed by: EMERGENCY MEDICINE

## 2021-01-11 PROCEDURE — 80179 DRUG ASSAY SALICYLATE: CPT

## 2021-01-11 PROCEDURE — 93005 ELECTROCARDIOGRAM TRACING: CPT

## 2021-01-11 PROCEDURE — 96361 HYDRATE IV INFUSION ADD-ON: CPT

## 2021-01-11 PROCEDURE — 96365 THER/PROPH/DIAG IV INF INIT: CPT

## 2021-01-11 PROCEDURE — 81003 URINALYSIS AUTO W/O SCOPE: CPT | Mod: 59

## 2021-01-11 PROCEDURE — 83735 ASSAY OF MAGNESIUM: CPT

## 2021-01-11 PROCEDURE — 80143 DRUG ASSAY ACETAMINOPHEN: CPT

## 2021-01-11 PROCEDURE — 84484 ASSAY OF TROPONIN QUANT: CPT

## 2021-01-11 PROCEDURE — 25000003 PHARM REV CODE 250: Performed by: EMERGENCY MEDICINE

## 2021-01-11 PROCEDURE — 80307 DRUG TEST PRSMV CHEM ANLYZR: CPT

## 2021-01-11 PROCEDURE — 85730 THROMBOPLASTIN TIME PARTIAL: CPT

## 2021-01-11 PROCEDURE — U0002 COVID-19 LAB TEST NON-CDC: HCPCS | Performed by: EMERGENCY MEDICINE

## 2021-01-11 PROCEDURE — 93010 ELECTROCARDIOGRAM REPORT: CPT | Mod: ,,, | Performed by: INTERNAL MEDICINE

## 2021-01-11 PROCEDURE — 99291 CRITICAL CARE FIRST HOUR: CPT | Mod: 25

## 2021-01-11 PROCEDURE — 82077 ASSAY SPEC XCP UR&BREATH IA: CPT

## 2021-01-11 PROCEDURE — 93010 EKG 12-LEAD: ICD-10-PCS | Mod: ,,, | Performed by: INTERNAL MEDICINE

## 2021-01-11 PROCEDURE — 80053 COMPREHEN METABOLIC PANEL: CPT

## 2021-01-11 PROCEDURE — 84443 ASSAY THYROID STIM HORMONE: CPT

## 2021-01-11 PROCEDURE — 83880 ASSAY OF NATRIURETIC PEPTIDE: CPT

## 2021-01-11 PROCEDURE — 85025 COMPLETE CBC W/AUTO DIFF WBC: CPT

## 2021-01-11 PROCEDURE — 85610 PROTHROMBIN TIME: CPT

## 2021-01-11 RX ORDER — PHENYTOIN SODIUM 100 MG/1
100 CAPSULE, EXTENDED RELEASE ORAL
Status: COMPLETED | OUTPATIENT
Start: 2021-01-11 | End: 2021-01-11

## 2021-01-11 RX ORDER — FOLIC ACID 1 MG/1
1 TABLET ORAL DAILY
Qty: 30 TABLET | Refills: 0 | Status: ON HOLD | OUTPATIENT
Start: 2021-01-11 | End: 2023-02-01 | Stop reason: HOSPADM

## 2021-01-11 RX ORDER — PHENYTOIN 50 MG/1
50 TABLET, CHEWABLE ORAL 3 TIMES DAILY
COMMUNITY
End: 2021-01-11 | Stop reason: SDUPTHER

## 2021-01-11 RX ORDER — PHENYTOIN 50 MG/1
50 TABLET, CHEWABLE ORAL 3 TIMES DAILY
Qty: 90 TABLET | Refills: 0 | OUTPATIENT
Start: 2021-01-11 | End: 2022-12-27

## 2021-01-11 RX ORDER — LEVETIRACETAM 10 MG/ML
1000 INJECTION INTRAVASCULAR
Status: COMPLETED | OUTPATIENT
Start: 2021-01-11 | End: 2021-01-11

## 2021-01-11 RX ORDER — ALBUTEROL SULFATE 1.25 MG/3ML
1.25 SOLUTION RESPIRATORY (INHALATION) EVERY 6 HOURS PRN
Qty: 75 ML | Refills: 0 | OUTPATIENT
Start: 2021-01-11 | End: 2023-01-05

## 2021-01-11 RX ORDER — LEVETIRACETAM 500 MG/1
1000 TABLET ORAL 2 TIMES DAILY
Qty: 120 TABLET | Refills: 0 | OUTPATIENT
Start: 2021-01-11 | End: 2022-12-27

## 2021-01-11 RX ADMIN — SODIUM CHLORIDE 500 ML: 0.9 INJECTION, SOLUTION INTRAVENOUS at 06:01

## 2021-01-11 RX ADMIN — LEVETIRACETAM INJECTION 1000 MG: 10 INJECTION INTRAVENOUS at 04:01

## 2021-01-11 RX ADMIN — SODIUM CHLORIDE 1000 ML: 0.9 INJECTION, SOLUTION INTRAVENOUS at 04:01

## 2021-01-11 RX ADMIN — PHENYTOIN SODIUM 100 MG: 100 CAPSULE ORAL at 04:01

## 2022-12-27 ENCOUNTER — HOSPITAL ENCOUNTER (EMERGENCY)
Facility: HOSPITAL | Age: 62
Discharge: HOME | End: 2022-12-27
Attending: EMERGENCY MEDICINE
Payer: MEDICAID

## 2022-12-27 VITALS
HEART RATE: 70 BPM | OXYGEN SATURATION: 99 % | SYSTOLIC BLOOD PRESSURE: 123 MMHG | RESPIRATION RATE: 15 BRPM | OXYGEN SATURATION: 95 % | WEIGHT: 145 LBS | HEIGHT: 69 IN | DIASTOLIC BLOOD PRESSURE: 92 MMHG | TEMPERATURE: 99 F | HEIGHT: 69 IN | HEART RATE: 78 BPM | BODY MASS INDEX: 21.49 KG/M2 | DIASTOLIC BLOOD PRESSURE: 69 MMHG | SYSTOLIC BLOOD PRESSURE: 142 MMHG | WEIGHT: 145.06 LBS | RESPIRATION RATE: 18 BRPM | BODY MASS INDEX: 21.48 KG/M2

## 2022-12-27 DIAGNOSIS — G40.909 SEIZURE DISORDER: ICD-10-CM

## 2022-12-27 DIAGNOSIS — R56.9 SEIZURE: Primary | ICD-10-CM

## 2022-12-27 DIAGNOSIS — Z00.00 GENERAL MEDICAL EXAM: Primary | ICD-10-CM

## 2022-12-27 LAB
ALBUMIN SERPL BCP-MCNC: 4.7 G/DL (ref 3.5–5.2)
ALP SERPL-CCNC: 61 U/L (ref 55–135)
ALT SERPL W/O P-5'-P-CCNC: 18 U/L (ref 10–44)
ANION GAP SERPL CALC-SCNC: 9 MMOL/L (ref 8–16)
AST SERPL-CCNC: 27 U/L (ref 10–40)
BILIRUB SERPL-MCNC: 1.9 MG/DL (ref 0.1–1)
BUN SERPL-MCNC: 26 MG/DL (ref 6–20)
CALCIUM SERPL-MCNC: 9.3 MG/DL (ref 8.7–10.5)
CHLORIDE SERPL-SCNC: 102 MMOL/L (ref 95–110)
CO2 SERPL-SCNC: 29 MMOL/L (ref 23–29)
CREAT SERPL-MCNC: 1.1 MG/DL (ref 0.5–1.4)
EST. GFR  (NO RACE VARIABLE): >60 ML/MIN/1.73 M^2
GLUCOSE SERPL-MCNC: 79 MG/DL (ref 70–110)
POTASSIUM SERPL-SCNC: 4 MMOL/L (ref 3.5–5.1)
PROT SERPL-MCNC: 7.7 G/DL (ref 6–8.4)
SODIUM SERPL-SCNC: 140 MMOL/L (ref 136–145)

## 2022-12-27 PROCEDURE — 63600175 PHARM REV CODE 636 W HCPCS: Performed by: EMERGENCY MEDICINE

## 2022-12-27 PROCEDURE — 80053 COMPREHEN METABOLIC PANEL: CPT | Performed by: EMERGENCY MEDICINE

## 2022-12-27 PROCEDURE — 99283 EMERGENCY DEPT VISIT LOW MDM: CPT | Mod: 25

## 2022-12-27 PROCEDURE — 96374 THER/PROPH/DIAG INJ IV PUSH: CPT

## 2022-12-27 PROCEDURE — 99283 EMERGENCY DEPT VISIT LOW MDM: CPT | Mod: 25,27

## 2022-12-27 RX ORDER — LEVETIRACETAM 500 MG/5ML
1000 INJECTION, SOLUTION, CONCENTRATE INTRAVENOUS
Status: COMPLETED | OUTPATIENT
Start: 2022-12-27 | End: 2022-12-27

## 2022-12-27 RX ORDER — LEVETIRACETAM 1000 MG/1
1000 TABLET ORAL 2 TIMES DAILY
Qty: 60 TABLET | Refills: 11 | Status: SHIPPED | OUTPATIENT
Start: 2022-12-27 | End: 2023-01-27 | Stop reason: SDUPTHER

## 2022-12-27 RX ADMIN — LEVETIRACETAM 1000 MG: 100 INJECTION, SOLUTION INTRAVENOUS at 09:12

## 2022-12-27 NOTE — ED TRIAGE NOTES
Pt here with complaint out of meds. AAO X 4./ pt reports generalized not feeling well. Skin warm and dry. Resp even and unlabored. STEINBERG normally.

## 2022-12-27 NOTE — DISCHARGE INSTRUCTIONS
Please follow-up with your primary care doctor, or at one of the clinics above this week.  Keppra as directed.  Please return immediately if you get worse or if new problems develop.  Seizure precautions.

## 2022-12-27 NOTE — ED PROVIDER NOTES
"Encounter Date: 12/27/2022    SCRIBE #1 NOTE: I, Mona Brush, am scribing for, and in the presence of,  Dusty Allison MD. I have scribed the following portions of the note - Other sections scribed: HPI, ROS.     History     Chief Complaint   Patient presents with    Medication Refill     Per pt not feeling well bc he hasnt had any meds in 1 week and would like a refills, also does not know current home meds       HPI: This is a 62 y.o.male patient, with a PMHx of Asthma, COPD, Seizures, presenting to the ED via EMS for further evaluation of medication refill for seizures. Patient reports seizure activity today but per EMS personnel, patient had no seizure activity or any signs of seizures. Patient states he has been out of his medications for a week but is unsure what the specific medication is. Patient reports associated symptoms of dizziness beginning couple days ago along with nausea and 2 episodes of vomiting. Patient states all symptoms are resolved at this time and reports, " I feel fine". Patient denies cough, shortness of breath, chest pain, fever, chills, abdominal pain, diarrhea, dysuria, headaches, congestion, sore throat, arm or leg trouble, eye pain, ear pain, rash, or other associated symptoms. No prior Tx. No alleviating or aggravating factors. Patient is allergic to Keflex.        The history is provided by the patient and the EMS personnel. No  was used.   Review of patient's allergies indicates:   Allergen Reactions    Keflex [cephalexin]      Past Medical History:   Diagnosis Date    Asthma     COPD (chronic obstructive pulmonary disease)     Seizures      No past surgical history on file.  No family history on file.  Social History     Tobacco Use    Smoking status: Every Day     Packs/day: 0.50     Types: Cigarettes    Smokeless tobacco: Never   Substance Use Topics    Alcohol use: Yes     Comment: daily (pint of vodka per day)    Drug use: Yes     Types: Marijuana     " Comment: daily     Review of Systems   Constitutional:  Negative for chills, diaphoresis and fever.   HENT:  Negative for ear pain and sore throat.    Eyes:  Negative for pain.   Respiratory:  Negative for cough and shortness of breath.    Cardiovascular:  Negative for chest pain.   Gastrointestinal:  Positive for nausea (resolved) and vomiting (resolved). Negative for abdominal pain and diarrhea.   Genitourinary:  Negative for dysuria.   Musculoskeletal:  Negative for back pain.        (-) Arm or leg trouble.    Skin:  Negative for rash.   Neurological:  Positive for seizures (resolved). Negative for headaches.   Psychiatric/Behavioral:  Negative for confusion.      Physical Exam     Initial Vitals [12/27/22 0839]   BP Pulse Resp Temp SpO2   123/70 78 14 98.5 °F (36.9 °C) 98 %      MAP       --         Physical Exam  The patient was examined specifically for the following:   General:No significant distress, Good color, Warm and dry. Head and neck:Scalp atraumatic, Neck supple. Neurological:Appropriate conversation, Gross motor deficits. Eyes:Conjugate gaze, Clear corneas. ENT: No epistaxis. Cardiac: Regular rate and rhythm, Grossly normal heart tones. Pulmonary: Wheezing, Rales. Gastrointestinal: Abdominal tenderness, Abdominal distention. Musculoskeletal: Extremity deformity, Apparent pain with range of motion of the joints. Skin: Rash.   The findings on examination were normal except for the following:  The patient is very animated.  The lungs are clear.  The heart tones are normal.  The abdomen is nontender.  There is no evidence of trauma.  The patient did not bite his tongue.  ED Course   Procedures  Labs Reviewed - No data to display       Imaging Results    None       Medical decision making:  Given the above the encounter is interesting for the packed that the patient reports a seizure with EMS.  EMS reports that the patient did not have a seizure.  The patient has an apparent history of seizures.  I am not  sure if he had a seizure earlier today.  His blood pressure and his heart rate are normal.  I will avoid lisinopril and Coreg.  The patient was on a miniscule dose of Dilantin.  I will refill the patient's Keppra.  I will have him follow up with primary care.  The patient has no complaints.  He reports that he feels well now.       Medications   levETIRAcetam injection 1,000 mg (1,000 mg Intravenous Given 12/27/22 0940)     Medical Decision Making:   History:   Old Medical Records: I decided to obtain old medical records.        Scribe Attestation:   Scribe #1: I performed the above scribed service and the documentation accurately describes the services I performed. I attest to the accuracy of the note.                   Clinical Impression:   Final diagnoses:  [R56.9] Seizure (Primary)  [G40.909] Seizure disorder         I personally performed the services described in this documentation.  All medical record  entries made by the scribe are at my direction and in my presence.  Signed, Dr. Allison     ED Disposition Condition    Discharge Stable          ED Prescriptions       Medication Sig Dispense Start Date End Date Auth. Provider    levETIRAcetam (KEPPRA) 1000 MG tablet Take 1 tablet (1,000 mg total) by mouth 2 (two) times daily. 60 tablet 12/27/2022 12/27/2023 Dusty Allison MD          Follow-up Information       Follow up With Specialties Details Why Contact Info    [facility] [place]  In 3 days  [address] [place] [address]  [phone]      [facility]  In 3 days  [address]  [facility] [address]  [phone]               Dusty Allison MD  12/30/22 9827

## 2022-12-28 NOTE — ED PROVIDER NOTES
"Encounter Date: 12/27/2022    SCRIBE #1 NOTE: I, Adolfo Andersen, am scribing for, and in the presence of,  Dusty Villegas MD. I have scribed the following portions of the note - Other sections scribed: HPI, ROS, PE.     History     Chief Complaint   Patient presents with    Seizures     Patient brought in by EMS after having one seizure. Unwitnessed by EMS. Patient was GCS 15 with EMS, positive seizure history.      Pierre Pierson is a 62 y.o male with a PMHx of asthma, COPD, and seizures, that comes to the ED via EMS for evaluation of seizures beginning today. EMS reports initial call for seizures, reporting that the patient was AAOx3 on arrival and no seizure-like activity was noted.  When the patient was asked what a seizure was to him he stated "like somebody is twisting a knot all over my body." Patient further states "the sensation is painful at times, but right now it is slow." Patient states he is unsure of how long he has felt this way, but states it has been constant all day. The patient also reports some nausea and vomiting yesterday. Patient is compliant with Keppra, which he reports "just got today." No medications taken PTA. No alleviating or exacerbating factors noted. Denies CP, headache, fever, cough, or other associated symptoms. Patient endorses being a current smoker, as well as endorsing EtOH usage "I don't drink I swim, everyday if I can afford it," reporting last usage 2 days ago.     The history is provided by the patient and the EMS personnel. No  was used.   Review of patient's allergies indicates:   Allergen Reactions    Keflex [cephalexin]      Past Medical History:   Diagnosis Date    Asthma     COPD (chronic obstructive pulmonary disease)     Seizures      No past surgical history on file.  No family history on file.  Social History     Tobacco Use    Smoking status: Every Day     Packs/day: 0.50     Types: Cigarettes    Smokeless tobacco: Never   Substance Use Topics "    Alcohol use: Yes     Comment: daily (pint of vodka per day)    Drug use: Yes     Types: Marijuana     Comment: daily     Review of Systems   Constitutional:  Negative for chills and fever.   HENT:  Negative for congestion.    Eyes:  Negative for visual disturbance.   Respiratory:  Negative for cough and shortness of breath.    Cardiovascular:  Negative for chest pain.   Gastrointestinal:  Positive for nausea and vomiting. Negative for abdominal pain and diarrhea.   Genitourinary:  Negative for dysuria.   Musculoskeletal:  Negative for back pain.   Skin:  Negative for rash.   Neurological:  Positive for seizures. Negative for headaches.     Physical Exam     Initial Vitals [12/27/22 2010]   BP Pulse Resp Temp SpO2   (!) 142/92 90 18 -- 96 %      MAP       --         Physical Exam    Nursing note and vitals reviewed.  Constitutional: He appears well-developed. He is not diaphoretic. No distress.   HENT:   Head: Normocephalic.   Eyes: EOM are normal. Pupils are equal, round, and reactive to light.   Pupils 2 mm bilaterally.   Cardiovascular:  Normal rate, regular rhythm and normal heart sounds.           No murmur heard.  Pulmonary/Chest: Effort normal and breath sounds normal. He has no wheezes.   Abdominal: Abdomen is soft. He exhibits no distension. There is no abdominal tenderness.   Musculoskeletal:         General: Normal range of motion.     Neurological: He is alert and oriented to person, place, and time.   Skin: Skin is warm.       ED Course   Procedures  Labs Reviewed   COMPREHENSIVE METABOLIC PANEL - Abnormal; Notable for the following components:       Result Value    BUN 26 (*)     Total Bilirubin 1.9 (*)     All other components within normal limits          Imaging Results    None          Medications - No data to display  Medical Decision Making:   History:   Old Medical Records: I decided to obtain old medical records.  Old Records Summarized: other records.       <> Summary of Records: Patient was  previously seen earlier today, 12/27/2022, where he got a refill on his prescription of Kepra, which he stated being out for 2 weeks, and was subsequently discharged.  Initial Assessment:   62-year-old male presenting with complaint of twisting sensation over his whole body.  Patient was seen earlier today for medication refill.  On exam physical exam benign with no abnormal mental status or neuro deficit.  No electrolyte abnormalities noted.  Patient that appear to be in any imminent physical danger.  Patient to follow up with primary care otherwise.  Clinical Tests:   Lab Tests: Ordered and Reviewed        Scribe Attestation:   Scribe #1: I performed the above scribed service and the documentation accurately describes the services I performed. I attest to the accuracy of the note.                   Clinical Impression:   Final diagnoses:  [Z00.00] General medical exam (Primary)        ED Disposition Condition    Discharge Stable          ED Prescriptions    None       Follow-up Information       Follow up With Specialties Details Why Contact Info    [facility]  Schedule an appointment as soon as possible for a visit in 2 days Primary care [address]  [facility] [address]  [phone]      [facility] - Emergency Dept Emergency Medicine  If symptoms worsen [address] [place] [id number] [phone]            I, Dusty Villegas, personally performed the services described in this documentation. All medical record entries made by the scribe were at my direction and in my presence. I have reviewed the chart and agree that the record reflects my personal performance and is accurate and complete.       Dusty Villegas MD  12/28/22 0433

## 2022-12-28 NOTE — ED NOTES
Patient was very upset that he was being discharged. He kept asking to stay and I explained that he was medically cleared to leave and that the hospital beds were reserved for people who needed to be seen or stay in the hospital. The patient continued to ask me to find him somewhere to stay and I explained that it is his responsibility to find somewhere to stay when it is cold outside. At this time, the patient became more agitated so I asked security to be present for discharge. Security came immediately and stood by while I continued to explain to the patient that it was time for him to go. The patient admitted that he was trying to stay out of the cold and blamed me for him not having somewhere to stay and for being homeless. I unhooked the patient from the monitor, removed his IV, and he continued to argue. Security told me I should leave the room and they would handle it from here.

## 2022-12-28 NOTE — ED TRIAGE NOTES
Seizures (Patient brought in by EMS after having one seizure. Unwitnessed by EMS. Patient was GCS 15 with EMS, positive seizure history. Patient intoxicated

## 2022-12-29 ENCOUNTER — HOSPITAL ENCOUNTER (EMERGENCY)
Facility: HOSPITAL | Age: 62
Discharge: HOME | End: 2022-12-30
Attending: EMERGENCY MEDICINE
Payer: MEDICAID

## 2022-12-29 DIAGNOSIS — R41.82 ALTERED MENTAL STATUS: ICD-10-CM

## 2022-12-29 DIAGNOSIS — F10.920 ALCOHOLIC INTOXICATION WITHOUT COMPLICATION: Primary | ICD-10-CM

## 2022-12-29 LAB
ALBUMIN SERPL BCP-MCNC: 3.7 G/DL (ref 3.5–5.2)
ALP SERPL-CCNC: 64 U/L (ref 55–135)
ALT SERPL W/O P-5'-P-CCNC: 17 U/L (ref 10–44)
ANION GAP SERPL CALC-SCNC: 10 MMOL/L (ref 8–16)
AST SERPL-CCNC: 32 U/L (ref 10–40)
BASOPHILS # BLD AUTO: 0.04 K/UL (ref 0–0.2)
BASOPHILS NFR BLD: 0.5 % (ref 0–1.9)
BILIRUB SERPL-MCNC: 1.3 MG/DL (ref 0.1–1)
BNP SERPL-MCNC: 98 PG/ML (ref 0–99)
BUN SERPL-MCNC: 21 MG/DL (ref 6–20)
CALCIUM SERPL-MCNC: 8.6 MG/DL (ref 8.7–10.5)
CHLORIDE SERPL-SCNC: 106 MMOL/L (ref 95–110)
CO2 SERPL-SCNC: 26 MMOL/L (ref 23–29)
CREAT SERPL-MCNC: 0.8 MG/DL (ref 0.5–1.4)
DIFFERENTIAL METHOD: ABNORMAL
EOSINOPHIL # BLD AUTO: 0.2 K/UL (ref 0–0.5)
EOSINOPHIL NFR BLD: 2.6 % (ref 0–8)
ERYTHROCYTE [DISTWIDTH] IN BLOOD BY AUTOMATED COUNT: 13.2 % (ref 11.5–14.5)
EST. GFR  (NO RACE VARIABLE): >60 ML/MIN/1.73 M^2
GLUCOSE SERPL-MCNC: 82 MG/DL (ref 70–110)
HCT VFR BLD AUTO: 35.9 % (ref 40–54)
HGB BLD-MCNC: 11.6 G/DL (ref 14–18)
IMM GRANULOCYTES # BLD AUTO: 0.03 K/UL (ref 0–0.04)
IMM GRANULOCYTES NFR BLD AUTO: 0.4 % (ref 0–0.5)
INR PPP: 1 (ref 0.8–1.2)
LYMPHOCYTES # BLD AUTO: 1.5 K/UL (ref 1–4.8)
LYMPHOCYTES NFR BLD: 19.1 % (ref 18–48)
MAGNESIUM SERPL-MCNC: 2.1 MG/DL (ref 1.6–2.6)
MCH RBC QN AUTO: 31 PG (ref 27–31)
MCHC RBC AUTO-ENTMCNC: 32.3 G/DL (ref 32–36)
MCV RBC AUTO: 96 FL (ref 82–98)
MONOCYTES # BLD AUTO: 0.7 K/UL (ref 0.3–1)
MONOCYTES NFR BLD: 8.1 % (ref 4–15)
NEUTROPHILS # BLD AUTO: 5.5 K/UL (ref 1.8–7.7)
NEUTROPHILS NFR BLD: 69.3 % (ref 38–73)
NRBC BLD-RTO: 0 /100 WBC
PLATELET # BLD AUTO: 184 K/UL (ref 150–450)
PMV BLD AUTO: 9.6 FL (ref 9.2–12.9)
POCT GLUCOSE: 79 MG/DL (ref 70–110)
POTASSIUM SERPL-SCNC: 4 MMOL/L (ref 3.5–5.1)
PROT SERPL-MCNC: 6.4 G/DL (ref 6–8.4)
PROTHROMBIN TIME: 10.9 SEC (ref 9–12.5)
RBC # BLD AUTO: 3.74 M/UL (ref 4.6–6.2)
SODIUM SERPL-SCNC: 142 MMOL/L (ref 136–145)
TROPONIN I SERPL DL<=0.01 NG/ML-MCNC: <0.006 NG/ML (ref 0–0.03)
TSH SERPL DL<=0.005 MIU/L-ACNC: 0.62 UIU/ML (ref 0.4–4)
WBC # BLD AUTO: 7.99 K/UL (ref 3.9–12.7)

## 2022-12-29 PROCEDURE — 93010 EKG 12-LEAD: ICD-10-PCS | Mod: ,,, | Performed by: INTERNAL MEDICINE

## 2022-12-29 PROCEDURE — 83735 ASSAY OF MAGNESIUM: CPT | Performed by: EMERGENCY MEDICINE

## 2022-12-29 PROCEDURE — 84443 ASSAY THYROID STIM HORMONE: CPT | Performed by: EMERGENCY MEDICINE

## 2022-12-29 PROCEDURE — 85025 COMPLETE CBC W/AUTO DIFF WBC: CPT | Performed by: EMERGENCY MEDICINE

## 2022-12-29 PROCEDURE — 99285 EMERGENCY DEPT VISIT HI MDM: CPT | Mod: 25

## 2022-12-29 PROCEDURE — 83880 ASSAY OF NATRIURETIC PEPTIDE: CPT | Performed by: EMERGENCY MEDICINE

## 2022-12-29 PROCEDURE — 84484 ASSAY OF TROPONIN QUANT: CPT | Performed by: EMERGENCY MEDICINE

## 2022-12-29 PROCEDURE — 93005 ELECTROCARDIOGRAM TRACING: CPT

## 2022-12-29 PROCEDURE — 82962 GLUCOSE BLOOD TEST: CPT

## 2022-12-29 PROCEDURE — 80053 COMPREHEN METABOLIC PANEL: CPT | Performed by: EMERGENCY MEDICINE

## 2022-12-29 PROCEDURE — 82077 ASSAY SPEC XCP UR&BREATH IA: CPT | Performed by: EMERGENCY MEDICINE

## 2022-12-29 PROCEDURE — 63600175 PHARM REV CODE 636 W HCPCS: Performed by: EMERGENCY MEDICINE

## 2022-12-29 PROCEDURE — 93010 ELECTROCARDIOGRAM REPORT: CPT | Mod: ,,, | Performed by: INTERNAL MEDICINE

## 2022-12-29 PROCEDURE — 85610 PROTHROMBIN TIME: CPT | Performed by: EMERGENCY MEDICINE

## 2022-12-29 PROCEDURE — 96374 THER/PROPH/DIAG INJ IV PUSH: CPT

## 2022-12-29 RX ORDER — LEVETIRACETAM 500 MG/5ML
1000 INJECTION, SOLUTION, CONCENTRATE INTRAVENOUS ONCE
Status: COMPLETED | OUTPATIENT
Start: 2022-12-29 | End: 2022-12-29

## 2022-12-29 RX ADMIN — LEVETIRACETAM 1000 MG: 100 INJECTION, SOLUTION INTRAVENOUS at 10:12

## 2022-12-30 ENCOUNTER — HOSPITAL ENCOUNTER (EMERGENCY)
Facility: HOSPITAL | Age: 62
Discharge: HOME | End: 2022-12-31
Attending: STUDENT IN AN ORGANIZED HEALTH CARE EDUCATION/TRAINING PROGRAM
Payer: MEDICAID

## 2022-12-30 VITALS
HEART RATE: 90 BPM | HEIGHT: 69 IN | OXYGEN SATURATION: 95 % | DIASTOLIC BLOOD PRESSURE: 85 MMHG | WEIGHT: 140 LBS | BODY MASS INDEX: 20.73 KG/M2 | RESPIRATION RATE: 16 BRPM | SYSTOLIC BLOOD PRESSURE: 133 MMHG | TEMPERATURE: 98 F

## 2022-12-30 VITALS
TEMPERATURE: 98 F | HEART RATE: 90 BPM | RESPIRATION RATE: 16 BRPM | DIASTOLIC BLOOD PRESSURE: 64 MMHG | BODY MASS INDEX: 20.67 KG/M2 | OXYGEN SATURATION: 96 % | SYSTOLIC BLOOD PRESSURE: 123 MMHG | WEIGHT: 140 LBS

## 2022-12-30 DIAGNOSIS — M25.551 RIGHT HIP PAIN: ICD-10-CM

## 2022-12-30 DIAGNOSIS — M62.838 TRAPEZIUS MUSCLE SPASM: Primary | ICD-10-CM

## 2022-12-30 LAB — ETHANOL SERPL-MCNC: 34 MG/DL

## 2022-12-30 PROCEDURE — 99283 EMERGENCY DEPT VISIT LOW MDM: CPT

## 2022-12-30 RX ORDER — ORPHENADRINE CITRATE 30 MG/ML
60 INJECTION INTRAMUSCULAR; INTRAVENOUS
Status: DISCONTINUED | OUTPATIENT
Start: 2022-12-30 | End: 2022-12-31 | Stop reason: HOSPADM

## 2022-12-30 NOTE — ED TRIAGE NOTES
Pt here via EMS with reports of needing his seizure medication refilled. Pt admits to alcohol use.

## 2022-12-30 NOTE — ED PROVIDER NOTES
Encounter Date: 12/29/2022    SCRIBE #1 NOTE: I, Luis Carlos Silva, am scribing for, and in the presence of,  Jack Schaffer MD. I have scribed the entire note.     History     Chief Complaint   Patient presents with    Alcohol Intoxication     EMS called to [facility] with c/o intoxication. Reports he was trying to get his seizure meds refilled.      Time seen by provider: 9:38 PM    This is a 62 y.o. male with history of epilepsy who presents for concerns of AMS with unknown last normal. Patient explains that he is homeless but previously had a friend acting as a caretaker for him. He has not had any of his Keppra in one week. Patient describes an episode of losing consciousness and recalls this as familiar to prior seizures, prompting him to activate emergency services. He denies any falls or head injury. Patient is unsure of the year or what city he is in, stating he was in [place] last he knew.    The history is provided by the patient.   Review of patient's allergies indicates:   Allergen Reactions    Keflex [cephalexin]      Past Medical History:   Diagnosis Date    Asthma     COPD (chronic obstructive pulmonary disease)     Seizures      History reviewed. No pertinent surgical history.  History reviewed. No pertinent family history.  Social History     Tobacco Use    Smoking status: Every Day     Packs/day: 0.50     Types: Cigarettes    Smokeless tobacco: Never   Substance Use Topics    Alcohol use: Yes     Comment: daily (pint of vodka per day)    Drug use: Yes     Types: Marijuana     Comment: daily     Review of Systems   Unable to perform ROS: Mental status change     Physical Exam     Initial Vitals [12/29/22 2122]   BP Pulse Resp Temp SpO2   102/70 95 20 98.2 °F (36.8 °C) 97 %      MAP       --         Physical Exam    Nursing note and vitals reviewed.  Constitutional: He is not diaphoretic. No distress.   Disheveled and unkempt   HENT:   Head: Normocephalic and atraumatic.   Mouth/Throat: Oropharynx is clear  and moist.   Eyes: Conjunctivae and EOM are normal. Pupils are equal, round, and reactive to light. Right eye exhibits no discharge. Left eye exhibits no discharge. No scleral icterus.   Neck: Neck supple. No thyromegaly present. No tracheal deviation present. No JVD present.   Normal range of motion.  Cardiovascular:  Normal rate, regular rhythm, normal heart sounds and intact distal pulses.     Exam reveals no gallop and no friction rub.       No murmur heard.  Pulses:       Radial pulses are 2+ on the right side and 2+ on the left side.        Dorsalis pedis pulses are 2+ on the right side and 2+ on the left side.        Posterior tibial pulses are 2+ on the right side and 2+ on the left side.   Pulmonary/Chest: Breath sounds normal. No stridor. No respiratory distress. He has no wheezes. He has no rhonchi. He has no rales. He exhibits no tenderness.   Abdominal: Abdomen is soft. He exhibits no distension and no mass. There is no abdominal tenderness. There is no rebound and no guarding.   Musculoskeletal:         General: No tenderness or edema. Normal range of motion.      Cervical back: Normal range of motion and neck supple.     Lymphadenopathy:     He has no cervical adenopathy.   Neurological: He is alert. He has normal strength. He is disoriented. GCS eye subscore is 4. GCS verbal subscore is 5. GCS motor subscore is 6.   Oriented to self but not place or time. ROCIO with 5/5 strength.  F2N and H2S intact.  Steady gait with no ataxia.         Skin: Skin is warm and dry. Capillary refill takes less than 2 seconds. No rash and no abscess noted. No erythema. No pallor.   Psychiatric: Thought content normal. His mood appears anxious. His speech is tangential. He is slowed. He is not actively hallucinating. Cognition and memory are impaired. He expresses impulsivity. He does not express inappropriate judgment. He exhibits abnormal recent memory. He exhibits normal remote memory.   Confused likely from  intoxication and / or postictal state He is attentive.       ED Course   Procedures  Labs Reviewed   CBC W/ AUTO DIFFERENTIAL - Abnormal; Notable for the following components:       Result Value    RBC 3.74 (*)     Hemoglobin 11.6 (*)     Hematocrit 35.9 (*)     All other components within normal limits   COMPREHENSIVE METABOLIC PANEL - Abnormal; Notable for the following components:    BUN 21 (*)     Calcium 8.6 (*)     Total Bilirubin 1.3 (*)     All other components within normal limits   ALCOHOL,MEDICAL (ETHANOL) - Abnormal; Notable for the following components:    Alcohol, Serum 34 (*)     All other components within normal limits   POCT GLUCOSE - Abnormal; Notable for the following components:    POCT Glucose 144 (*)     All other components within normal limits   MAGNESIUM   TSH   TROPONIN I   PROTIME-INR   B-TYPE NATRIURETIC PEPTIDE   ALCOHOL,MEDICAL (ETHANOL)   POCT GLUCOSE        ECG Results              EKG 12-lead (Final result)  Result time 12/30/22 20:08:47      Final result by Interface, Lab In [facility] (12/30/22 20:08:47)                   Narrative:    Test Reason : R41.82,    Vent. Rate : 079 BPM     Atrial Rate : 079 BPM     P-R Int : 138 ms          QRS Dur : 172 ms      QT Int : 440 ms       P-R-T Axes : 084 258 076 degrees     QTc Int : 504 ms    Sinus rhythm with occasional Premature ventricular complexes  Right bundle branch block  Abnormal ECG  When compared with ECG of 27-DEC-2022 21:04,  Significant changes have occurred  Confirmed by Chang Lewis MD ([id number]) on 12/30/2022 8:08:39 PM    Referred By: [email or web address]   SELF           Confirmed By:Chang Lewis MD                                     EKG 12-LEAD (Final result)  Result time 01/05/23 07:44:37      Final result by Unknown User (01/05/23 07:44:37)                                      Imaging Results              X-Ray Chest AP Portable (Final result)  Result time 12/29/22 23:39:52      Final result by Nikunj Paris MD (12/29/22  23:39:52)                   Impression:      Mild chronic lung changes with otherwise no acute cardiopulmonary process identified.      Electronically signed by: Nikunj aPris MD  Date:    12/29/2022  Time:    23:39               Narrative:    EXAMINATION:  XR CHEST AP PORTABLE    CLINICAL HISTORY:  altered mental status;    TECHNIQUE:  Single frontal view of the chest was performed.    COMPARISON:  January 2021.    FINDINGS:  Cardiac silhouette is normal in size.  Lungs are symmetrically expanded.  Chronic coarse interstitial lung changes are seen with possible underlying COPD.  No evidence of focal consolidative process, pneumothorax, or significant pleural effusion.  No acute osseous abnormality identified.                                       CT Head Without Contrast (Final result)  Result time 12/29/22 22:41:57      Final result by Nikunj Paris MD (12/29/22 22:41:57)                   Impression:      No acute intracranial abnormalities identified.  Remote right temporal lobe infarction with encephalomalacia.    Acute comminuted bilateral nasal fractures.      Electronically signed by: Nikunj Paris MD  Date:    12/29/2022  Time:    22:41               Narrative:    EXAMINATION:  CT HEAD WITHOUT CONTRAST    CLINICAL HISTORY:  Mental status change, unknown cause;    TECHNIQUE:  Low dose axial images were obtained through the head.  Coronal and sagittal reformations were also performed. Contrast was not administered.    COMPARISON:  None.    FINDINGS:  Moderate size area of remote infarction with encephalomalacia is seen within the right temporal lobe.  No evidence of acute/recent major vascular distribution cerebral infarction, intraparenchymal hemorrhage, or intra-axial space occupying lesion. The ventricular system is normal in size and configuration with no evidence of hydrocephalus. No effacement of the skull-base cisterns. No abnormal extra-axial fluid collections or blood products. Visualized  paranasal sinuses and mastoid air cells are clear.  Acute comminuted bilateral nasal bone fractures are seen.  The calvarium shows no significant abnormality.                                       Medications   levETIRAcetam injection 1,000 mg (1,000 mg Intravenous Given 12/29/22 2258)     Medical Decision Making:   History:   Old Medical Records: I decided to obtain old medical records.  Initial Assessment:   62 year old male with reported history of seizures presents for AMS and medication refill. Patient is altered on physical exam with no obvious signs of trauma. Plan for labs, CT head, CXR, and EKG.  Differential Diagnosis:   My differential diagnosis includes, but is not limited to: alcohol intoxication, drug intoxication, subdural hematoma, seizures, subarachnoid hemorrhage, CVA/TIA, other intracranial injury, ACS/MI, PNA, hypoglycemia, electrolyte abnormalities.  Clinical Tests:   Lab Tests: Ordered and Reviewed  Radiological Study: Ordered and Reviewed  Medical Tests: Ordered and Reviewed        Scribe Attestation:   Scribe #1: I performed the above scribed service and the documentation accurately describes the services I performed. I attest to the accuracy of the note.              Pt initially arrived alert and confused but in NAD with VSS. His mental status gradually improved throughout ED OBS with resolution of disorientation and confusion. Keppra given due to reported Hx of seizures with no bouts of seizure activity occurring throughout ED OBS. CT head w/o returned with no acute findings. EKG revealed no acute findings concerning for ischemia, arrhythmia, heart block or any pathology to warrant cardiology consultation. CXR revealed no acute pathology. Labs were unremarkable in comparison to previous studies. Secondary exam did not reveal any clinical findings to warrant further evaluation as he was alert, lucid, oriented and displayed a steady gait with no signs of clinical intoxication. Pt discharged  and counseled on the need to return to the nearest emergency room if they experience any other concerning signs or symptoms.  Pt given information for outpatient follow-up as well.    Jack Schaffer MD                    I attest that I was available in the ED at the time of patient visit. I have reviewed the chart outlined by the midlevel provider and agree with the plan of care based on the documentation provided.     Jack Schaffer MD             Clinical Impression:   Final diagnoses:  [R41.82] Altered mental status  [F10.920] Alcoholic intoxication without complication (Primary)        ED Disposition Condition    Discharge Stable          ED Prescriptions    None       Follow-up Information       Follow up With Specialties Details Why Contact Info    [facility]  Schedule an appointment as soon as possible for a visit in 1 week to follow-up on today's visit [address]  [facility] [address]  [phone]      [facility] - Emergency Dept Emergency Medicine Go to  As needed, If symptoms worsen [address] [facility] [id number] [phone]             Jack Schaffer MD  01/06/23 0804

## 2022-12-31 LAB — POCT GLUCOSE: 144 MG/DL (ref 70–110)

## 2022-12-31 RX ORDER — METHOCARBAMOL 500 MG/1
500 TABLET, FILM COATED ORAL 4 TIMES DAILY
Qty: 40 TABLET | Refills: 0 | Status: SHIPPED | OUTPATIENT
Start: 2022-12-31 | End: 2023-01-10

## 2022-12-31 NOTE — ED NOTES
Pt cursing staff, refusing care. Rad tech at bedside attempting to perform xray. Pt screaming and cursing, verbally abusing staff- stating he cannot lay on his back. Pt noted to ambulate to bed without difficulty when arrived to ED.

## 2022-12-31 NOTE — ED PROVIDER NOTES
"Encounter Date: 12/30/2022    SCRIBE #1 NOTE: I, LEENA DHEERAJ, am scribing for, and in the presence of,  Henrry Winston MD. I have scribed the following portions of the note - Other sections scribed: HPI, ROS, PE.     History     Chief Complaint   Patient presents with    Shoulder Pain     Patient arrives via EMS with left shoulder pain that has been ongoing all day. He reports no fall, per EMS, but also told them that he may have had a seizure today (does have a history of seizures).      62-year-old male patient with a PMHX of asthma, COPD, and seizures presents to the ED via EMS with a chief complaint of left shoulder pain onset today. Patient states that he has been having constant left shoulder pain with additional lower back pain and neck pain. He further states that he has "no idea" if he had any recent falls, injuries, or seizures today. He notes that he was seen at this ED facility yesterday and discharged. Denies any difficulty with ambulating. No other exacerbating or alleviating factors. Denies any SOB, abdominal pain, generalized myalgias, fevers, or other associated symptoms.     The history is provided by the patient. No  was used.   Review of patient's allergies indicates:   Allergen Reactions    Keflex [cephalexin]      Past Medical History:   Diagnosis Date    Asthma     COPD (chronic obstructive pulmonary disease)     Seizures      No past surgical history on file.  No family history on file.  Social History     Tobacco Use    Smoking status: Every Day     Packs/day: 0.50     Types: Cigarettes    Smokeless tobacco: Never   Substance Use Topics    Alcohol use: Yes     Comment: daily (pint of vodka per day)    Drug use: Yes     Types: Marijuana     Comment: daily     Review of Systems   Constitutional:  Negative for chills and fever.   HENT:  Negative for facial swelling and sore throat.    Eyes:  Negative for visual disturbance.   Respiratory:  Negative for cough and " shortness of breath.    Cardiovascular:  Negative for chest pain and palpitations.   Gastrointestinal:  Negative for abdominal pain, nausea and vomiting.   Genitourinary:  Negative for dysuria and hematuria.   Musculoskeletal:  Positive for arthralgias, back pain and neck pain. Negative for myalgias.   Skin:  Negative for rash.   Neurological:  Negative for weakness and headaches.   Hematological:  Does not bruise/bleed easily.   Psychiatric/Behavioral: Negative.       Physical Exam     Initial Vitals   BP Pulse Resp Temp SpO2   12/30/22 2303 12/30/22 2258 12/30/22 2258 12/30/22 2258 12/30/22 2258   133/85 90 16 98.2 °F (36.8 °C) 95 %      MAP       --                Physical Exam    Nursing note and vitals reviewed.  Constitutional: He appears well-developed and well-nourished. He is not diaphoretic. No distress.   HENT:   Head: Normocephalic and atraumatic.   Right Ear: External ear normal.   Left Ear: External ear normal.   Nose: Nose normal.   Eyes: Conjunctivae are normal. No scleral icterus.   Neck: Neck supple. No tracheal deviation present.   Cardiovascular:  Normal rate, regular rhythm, normal heart sounds and intact distal pulses.     Exam reveals no gallop and no friction rub.       No murmur heard.  Pulmonary/Chest: Breath sounds normal. No respiratory distress.   Abdominal: Abdomen is soft. Bowel sounds are normal. There is no abdominal tenderness.   Musculoskeletal:      Cervical back: Neck supple. No tenderness.      Thoracic back: No tenderness.      Lumbar back: No tenderness.      Comments: Palpable spasm to trapezius back. No step-off or deformities. Pain to right hip with limited active ROM. Patient is able to ambulate.  Pain to palpation of the musculature of the right hip.  No bony tenderness.  Full passive range of motion.  Patient able to ambulate from the stretcher to the bed.  No shortening of the right leg.  2+ equal DP pulses noted to the extremities.     Neurological: He is alert and  oriented to person, place, and time. He has normal strength. No cranial nerve deficit or sensory deficit. GCS score is 15. GCS eye subscore is 4. GCS verbal subscore is 5. GCS motor subscore is 6.   Skin: Skin is warm and dry.   Psychiatric: He has a normal mood and affect. Thought content normal.       ED Course   Procedures  Labs Reviewed   POCT GLUCOSE MONITORING CONTINUOUS          Imaging Results    None          Medications   orphenadrine injection 60 mg (60 mg Intramuscular Not Given 12/30/22 2330)     Medical Decision Making:   History:   Old Medical Records: I decided to obtain old medical records.  Clinical Tests:   Lab Tests: Ordered and Reviewed  Radiological Study: Reviewed and Ordered        Scribe Attestation:   Scribe #1: I performed the above scribed service and the documentation accurately describes the services I performed. I attest to the accuracy of the note.      ED Course as of 12/31/22 0112   Fri Dec 30, 2022   2355 Pt glucose was 144 [CC]   Sat Dec 31, 2022   0108 Patient able to ambulate.  Patient refusing any treatments in the emergency department.  He otherwise looks well.  Glucose is normal.  He is not appear intoxicated at this time.  Plan for discharge.  Patient without acute injury noted.  Strict return precautions given. I discussed with the patient/family the diagnosis, treatment plan, indications for return to the emergency department, and for expected follow-up. The patient/family verbalized an understanding. The patient/family is asked if there are any questions or concerns. We discuss the case, until all issues are addressed to the patient/family's satisfaction. Patient/family understands and is agreeable to the plan. Patient is stable and ready for discharge.      [CC]   0110 He notes right hip pain but no bony tenderness on exam.  Patient ambulating so doubt fracture dislocation.  Neurovascularly intact in the right leg. [CC]      ED Course User Index  [CC] Henrry Winston  MD Arthur attestation: I, Henrry Winston MD, personally performed the services described in this documentation. All medical record entries made by the scribe were at my direction and in my presence.  I have reviewed the chart and agree that the record reflects my personal performance and is accurate and complete.   Clinical Impression:   Final diagnoses:  [M25.551] Right hip pain  [M62.838] Trapezius muscle spasm (Primary)        ED Disposition Condition    Discharge Stable          ED Prescriptions       Medication Sig Dispense Start Date End Date Auth. Provider    methocarbamoL (ROBAXIN) 500 MG Tab Take 1 tablet (500 mg total) by mouth 4 (four) times daily. for 10 days 40 tablet 12/31/2022 1/10/2023 Henrry Winston MD          Follow-up Information       Follow up With Specialties Details Why Contact Info    [facility]  Schedule an appointment as soon as possible for a visit in 1 day  [address]  [place] [address]  [phone]      [facility] - Emergency Dept Emergency Medicine Go to  If symptoms worsen [address] [facility] [id number] [phone]             Henrry Winston MD  12/31/22 0112

## 2022-12-31 NOTE — DISCHARGE INSTRUCTIONS
Thank you for coming to our Emergency Department today. It is important to remember that some problems or medical conditions are difficult to diagnose and may not be found or addressed during your Emergency Department visit.     Be sure to follow up with your primary care doctor and review all labs/imaging/tests that were performed during your ER visit with them. Some labs/imaging/tests may be outside of the normal range, and require non-emergent follow-up and/or further investigation/treatment/procedures/testing to help diagnose/exclude/prevent complications or other potentially serious medical conditions that were not discussed or addressed during your ER visit.    If you do not have a primary care doctor, you may contact the one listed on your discharge paperwork or you may also call the [facility] Appointment Desk at [phone] to schedule an appointment and establish care with one. It is important to your health that you have a primary care doctor.    Please take all medications as directed. All medications may potentially have side-effects and it is impossible to predict which medications may give you side-effects or what side-effects (if any) they will give you.. If you feel that you are having a negative effect or side-effect of any medication you should immediately stop taking them and seek medical attention. If you feel that you are having a life-threatening reaction call 911.    Return to the ER with any questions/concerns, new/concerning symptoms, worsening or failure to improve.     Do not drive, swim, climb to height, take a bath, operate heavy machinery, drink alcohol or take potentially sedating medications, sign any legal documents or make any important decisions for 24 hours if you have received any pain medications, sedatives or mood altering drugs during your ER visit or within 24 hours of taking them if they have been prescribed to you.     You can find additional resources for Dentists,  hearing aids, durable medical equipment, low cost pharmacies and other resources at https://[facility].org    BELOW THIS LINE ONLY APPLIES IF YOU HAVE A COVID TEST PENDING OR IF YOU HAVE BEEN DIAGNOSED WITH COVID:  Please access [facility] to review the results of your test. Until the results of your COVID test return, you should isolate yourself so as not to potentially spread illness to others.   If your COVID test returns positive, you should isolate yourself so as not to spread illness to others. After five full days, if you are feeling better and you have not had fever for 24 hours, you can return to your typical daily activities, but you must wear a mask around others for an additional 5 days.   If your COVID test returns negative and you are either unvaccinated or more than six months out from your two-dose vaccine and are not yet boosted, you should still quarantine for 5 full days followed by strict mask use for an additional 5 full days.   If your COVID test returns negative and you have received your 2-dose initial vaccine as well as a booster, you should continue strict mask use for 10 full days after the exposure.  For all those exposed, best practice includes a test at day 5 after the exposure. This can be a home test or a test through one of the many testing centers throughout our community.   Masking is always advised to limit the spread of COVID. Cdc.gov is an excellent site to obtain the latest up to date recommendations regarding COVID and COVID testing.     CDC Testing and Quarantine Guidelines for patients with exposure to a known-positive COVID-19 person:  A close exposure is defined as anyone who has had an exposure (masked or unmasked) to a known COVID -19 positive person within 6 feet of someone for a cumulative total of 15 minutes or more over a 24-hour period.   Vaccinated and/or if you recently had a positive covid test within 90 days do NOT need to quarantine after contact with someone  who had COVID-19 unless you develop symptoms.   Fully vaccinated people who have not had a positive test within 90 days, should get tested 3-5 days after their exposure, even if they don't have symptoms and wear a mask indoors in public for 14 days following exposure or until their test result is negative.      Unvaccinated and/or NOT had a positive test within 90 days and meet close exposure  You are required by CDC guidelines to quarantine for at least 5 days from time of exposure followed by 5 days of strict masking. It is recommended, but not required to test after 5 days, unless you develop symptoms, in which case you should test at that time.  If you get tested after 5 days and your test is positive, your 5 day period of isolation starts the day of the positive test.    If your exposure does not meet the above definition, you can return to your normal daily activities to include social distancing, wearing a mask and frequent handwashing.      Here is a link to guidance from the CDC:  https://www.cdc.gov/media/releases/2021/s1227-isolation-quarantine-guidance.html      [facility] Testing Sites:  https://ldh.[place].gov/page/3934      [facility] website with testing locations and guidance:  https://www.[facility].[email or web address]

## 2023-01-05 ENCOUNTER — HOSPITAL ENCOUNTER (EMERGENCY)
Facility: HOSPITAL | Age: 63
Discharge: HOME | End: 2023-01-05
Attending: EMERGENCY MEDICINE
Payer: MEDICAID

## 2023-01-05 VITALS
WEIGHT: 160 LBS | SYSTOLIC BLOOD PRESSURE: 129 MMHG | BODY MASS INDEX: 23.63 KG/M2 | TEMPERATURE: 98 F | HEART RATE: 74 BPM | DIASTOLIC BLOOD PRESSURE: 76 MMHG | RESPIRATION RATE: 18 BRPM | OXYGEN SATURATION: 99 %

## 2023-01-05 DIAGNOSIS — M25.512 ACUTE PAIN OF LEFT SHOULDER: ICD-10-CM

## 2023-01-05 DIAGNOSIS — J44.9 CHRONIC OBSTRUCTIVE PULMONARY DISEASE, UNSPECIFIED COPD TYPE: ICD-10-CM

## 2023-01-05 DIAGNOSIS — R05.9 COUGH: Primary | ICD-10-CM

## 2023-01-05 LAB
CTP QC/QA: YES
CTP QC/QA: YES
POC MOLECULAR INFLUENZA A AGN: NEGATIVE
POC MOLECULAR INFLUENZA B AGN: NEGATIVE
SARS-COV-2 RDRP RESP QL NAA+PROBE: NEGATIVE

## 2023-01-05 PROCEDURE — 87502 INFLUENZA DNA AMP PROBE: CPT

## 2023-01-05 PROCEDURE — 87635 SARS-COV-2 COVID-19 AMP PRB: CPT | Performed by: EMERGENCY MEDICINE

## 2023-01-05 PROCEDURE — 99284 EMERGENCY DEPT VISIT MOD MDM: CPT | Mod: 25

## 2023-01-05 RX ORDER — GUAIFENESIN 100 MG/5ML
100-200 SOLUTION ORAL EVERY 4 HOURS PRN
Qty: 60 ML | Refills: 0 | Status: SHIPPED | OUTPATIENT
Start: 2023-01-05 | End: 2023-01-15

## 2023-01-05 RX ORDER — ALBUTEROL SULFATE 90 UG/1
1-2 AEROSOL, METERED RESPIRATORY (INHALATION) EVERY 6 HOURS PRN
Qty: 18 G | Refills: 0 | Status: SHIPPED | OUTPATIENT
Start: 2023-01-05 | End: 2023-10-19 | Stop reason: SDUPTHER

## 2023-01-05 RX ORDER — DOXYCYCLINE 100 MG/1
100 CAPSULE ORAL 2 TIMES DAILY
Qty: 20 CAPSULE | Refills: 0 | Status: SHIPPED | OUTPATIENT
Start: 2023-01-05 | End: 2023-01-15

## 2023-01-05 RX ORDER — PREDNISONE 50 MG/1
50 TABLET ORAL DAILY
Qty: 5 TABLET | Refills: 0 | Status: SHIPPED | OUTPATIENT
Start: 2023-01-05 | End: 2023-01-10

## 2023-01-05 RX ORDER — CETIRIZINE HYDROCHLORIDE 10 MG/1
10 TABLET ORAL DAILY
Qty: 30 TABLET | Refills: 0 | Status: ON HOLD | OUTPATIENT
Start: 2023-01-05 | End: 2023-02-01 | Stop reason: HOSPADM

## 2023-01-05 RX ORDER — IBUPROFEN 600 MG/1
600 TABLET ORAL EVERY 6 HOURS PRN
Qty: 20 TABLET | Refills: 0 | Status: ON HOLD | OUTPATIENT
Start: 2023-01-05 | End: 2023-02-01 | Stop reason: HOSPADM

## 2023-01-05 NOTE — ED PROVIDER NOTES
Encounter Date: 1/5/2023       History     Chief Complaint   Patient presents with    Shoulder Pain     Pt reports he woke up this morning with left shoulder pain. Pt states he does not know if he fell yesterday. EMS reports the patient is homeless and is was sleeping on the ground in the front of the [facility]. Pt reports PMH of seizures and noncompliant with medication.      62 y.o. male Past Medical History:  No date: Asthma  No date: COPD (chronic obstructive pulmonary disease)  No date: Seizures     Endorses smoking, cough prod discolored sputum for the last few days. Notes he awoke with pain over L clavicle region. Denies trauma does have a hx of seizures and does not take his meds. Homeless.    Review of patient's allergies indicates:   Allergen Reactions    Keflex [cephalexin]      Past Medical History:   Diagnosis Date    Asthma     COPD (chronic obstructive pulmonary disease)     Seizures      No past surgical history on file.  No family history on file.  Social History     Tobacco Use    Smoking status: Every Day     Packs/day: 0.50     Types: Cigarettes    Smokeless tobacco: Never   Substance Use Topics    Alcohol use: Yes     Comment: daily (pint of vodka per day)    Drug use: Yes     Types: Marijuana     Comment: daily     Review of Systems   Constitutional:  Negative for fever.   HENT:  Negative for sore throat.    Respiratory:  Positive for cough. Negative for shortness of breath.    Cardiovascular:  Negative for chest pain.   Gastrointestinal:  Negative for nausea.   Genitourinary:  Negative for dysuria.   Musculoskeletal:  Negative for back pain.   Skin:  Negative for rash.   Neurological:  Negative for weakness.   Hematological:  Does not bruise/bleed easily.   All other systems reviewed and are negative.    Physical Exam     Initial Vitals [01/05/23 1732]   BP Pulse Resp Temp SpO2   130/62 86 16 98.1 °F (36.7 °C) 95 %      MAP       --         Physical Exam    Nursing note and vitals  reviewed.  Constitutional: He appears well-developed and well-nourished.   HENT:   Head: Normocephalic and atraumatic.   Eyes: EOM are normal. Pupils are equal, round, and reactive to light.   Cardiovascular:  Normal rate, regular rhythm and normal heart sounds.           Pulmonary/Chest: Effort normal and breath sounds normal. No respiratory distress. He has no wheezes. He has no rales.   Abdominal: He exhibits no distension.   Musculoskeletal:         General: No tenderness or edema.     Neurological: He is alert and oriented to person, place, and time.   Skin: Skin is warm and dry.   Psychiatric: He has a normal mood and affect.     L shoulder no ttp, full rom, no empty joint  L clavicle, feels stable but this is area of concern  ED Course   Procedures  Labs Reviewed   SARS-COV-2 RDRP GENE   POCT INFLUENZA A/B MOLECULAR          Imaging Results              X-Ray Clavicle Left (Final result)  Result time 01/05/23 18:30:35      Final result by Brad Lomeli MD (01/05/23 18:30:35)                   Impression:      1. No acute displaced fracture or dislocation of the left clavicle.      Electronically signed by: Brad Lomeli MD  Date:    01/05/2023  Time:    18:30               Narrative:    EXAMINATION:  XR CLAVICLE LEFT    CLINICAL HISTORY:  Pain, unspecified    COMPARISON:  None    FINDINGS:  Two views left clavicle.    No acute displaced fracture or dislocation of the clavicle.  The left shoulder appears intact.  There are degenerative changes of the acromioclavicular joint.  Please see separately dictated report for details of the pulmonary parenchyma.                                       X-Ray Chest AP Portable (Final result)  Result time 01/05/23 18:29:48      Final result by Brad Lomeli MD (01/05/23 18:29:48)                   Impression:      1. Pulmonary emphysematous change, no large focal consolidation.      Electronically signed by: Brad Lomeli  MD  Date:    01/05/2023  Time:    18:29               Narrative:    EXAMINATION:  XR CHEST AP PORTABLE    CLINICAL HISTORY:  Cough, unspecified    TECHNIQUE:  Single frontal view of the chest was performed.    COMPARISON:  12/29/2022    FINDINGS:  The cardiomediastinal silhouette is not enlarged.  There is no pleural effusion.  The trachea is midline.  The lungs are symmetrically expanded bilaterally with mildly coarse interstitial attenuation and scattered emphysematous change, similar to the previous examination..  No large focal consolidation seen.  There is no pneumothorax.  The osseous structures are remarkable for degenerative changes..                                       Medications - No data to display      After review of the patient's physical exam, ED testing, and history/symptoms, relevant labs, imaging, available outside records  a wide differential was considered including but not limited to: infectious, traumatic, vascular, toxicological, malignant, ischemic, embolic, psychological, genetic, idiopathic and other etiologies.  labs/imaging/interventions include: .   Given homelessness wide differential including pneumonia TD bronchitis malignancy COVID flu and other etiologies  Labs Reviewed   SARS-COV-2 RDRP GENE   POCT INFLUENZA A/B MOLECULAR      X-Ray Clavicle Left   Final Result      1. No acute displaced fracture or dislocation of the left clavicle.         Electronically signed by: Brad Lomeli MD   Date:    01/05/2023   Time:    18:30      X-Ray Chest AP Portable   Final Result      1. Pulmonary emphysematous change, no large focal consolidation.         Electronically signed by: Brad Lomeli MD   Date:    01/05/2023   Time:    18:29            I have independently evaluated and interpreted all available labs and imaging.  The suspected diagnosis, treatment and plan were discussed with the patient. All questions or concerns have been addressed.           Covid/flu, xr chest/clavicle        X-Ray Clavicle Left   Final Result      1. No acute displaced fracture or dislocation of the left clavicle.         Electronically signed by: Brad Lomeli MD   Date:    01/05/2023   Time:    18:30      X-Ray Chest AP Portable   Final Result      1. Pulmonary emphysematous change, no large focal consolidation.         Electronically signed by: Brad Lomeli MD   Date:    01/05/2023   Time:    18:29        Labs Reviewed   SARS-COV-2 RDRP GENE   POCT INFLUENZA A/B MOLECULAR       Will discharge on doxy, albuterol, mucinex, prednisone for copd flare. Ibuprofen prn pain.       Clinical Impression:   Final diagnoses:  [R05.9] Cough (Primary)  [M25.512] Acute pain of left shoulder  [J44.9] Chronic obstructive pulmonary disease, unspecified COPD type        ED Disposition Condition    Discharge Stable          ED Prescriptions    None       Follow-up Information    None          Delbert Fitzgerald MD  01/05/23 7887

## 2023-01-06 NOTE — DISCHARGE INSTRUCTIONS
Thank you for coming to our Emergency Department today. It is important to remember that some problems or medical conditions are difficult to diagnose and may not be found or addressed during your Emergency Department visit.     Be sure to follow up with your primary care doctor and review all labs/imaging/tests that were performed during your ER visit with them. Some labs/imaging/tests may be outside of the normal range, and require non-emergent follow-up and/or further investigation/treatment/procedures/testing to help diagnose/exclude/prevent complications or other potentially serious medical conditions that were not discussed or addressed during your ER visit.    If you do not have a primary care doctor, you may contact the one listed on your discharge paperwork or you may also call the [facility] Appointment Desk at [phone] to schedule an appointment and establish care with one. It is important to your health that you have a primary care doctor.    Please take all medications as directed. All medications may potentially have side-effects and it is impossible to predict which medications may give you side-effects or what side-effects (if any) they will give you.. If you feel that you are having a negative effect or side-effect of any medication you should immediately stop taking them and seek medical attention. If you feel that you are having a life-threatening reaction call 911.    Return to the ER with any questions/concerns, new/concerning symptoms, worsening or failure to improve.     Do not drive, swim, climb to height, take a bath, operate heavy machinery, drink alcohol or take potentially sedating medications, sign any legal documents or make any important decisions for 24 hours if you have received any pain medications, sedatives or mood altering drugs during your ER visit or within 24 hours of taking them if they have been prescribed to you.     You can find additional resources for Dentists,  hearing aids, durable medical equipment, low cost pharmacies and other resources at [email or web address]    BELOW THIS LINE ONLY APPLIES IF YOU HAVE A COVID TEST PENDING OR IF YOU HAVE BEEN DIAGNOSED WITH COVID:  Please access [facility] to review the results of your test. Until the results of your COVID test return, you should isolate yourself so as not to potentially spread illness to others.   If your COVID test returns positive, you should isolate yourself so as not to spread illness to others. After five full days, if you are feeling better and you have not had fever for 24 hours, you can return to your typical daily activities, but you must wear a mask around others for an additional 5 days.   If your COVID test returns negative and you are either unvaccinated or more than six months out from your two-dose vaccine and are not yet boosted, you should still quarantine for 5 full days followed by strict mask use for an additional 5 full days.   If your COVID test returns negative and you have received your 2-dose initial vaccine as well as a booster, you should continue strict mask use for 10 full days after the exposure.  For all those exposed, best practice includes a test at day 5 after the exposure. This can be a home test or a test through one of the many testing centers throughout our community.   Masking is always advised to limit the spread of COVID. Cdc.gov is an excellent site to obtain the latest up to date recommendations regarding COVID and COVID testing.     CDC Testing and Quarantine Guidelines for patients with exposure to a known-positive COVID-19 person:  A close exposure is defined as anyone who has had an exposure (masked or unmasked) to a known COVID -19 positive person within 6 feet of someone for a cumulative total of 15 minutes or more over a 24-hour period.   Vaccinated and/or if you recently had a positive covid test within 90 days do NOT need to quarantine after contact with someone  who had COVID-19 unless you develop symptoms.   Fully vaccinated people who have not had a positive test within 90 days, should get tested 3-5 days after their exposure, even if they don't have symptoms and wear a mask indoors in public for 14 days following exposure or until their test result is negative.      Unvaccinated and/or NOT had a positive test within 90 days and meet close exposure  You are required by CDC guidelines to quarantine for at least 5 days from time of exposure followed by 5 days of strict masking. It is recommended, but not required to test after 5 days, unless you develop symptoms, in which case you should test at that time.  If you get tested after 5 days and your test is positive, your 5 day period of isolation starts the day of the positive test.    If your exposure does not meet the above definition, you can return to your normal daily activities to include social distancing, wearing a mask and frequent handwashing.      Here is a link to guidance from the CDC:  https://www.cdc.gov/media/releases/2021/s1227-isolation-quarantine-guidance.html      [facility] Testing Sites:  https://ldh.la.gov/page/3934      [facility] website with testing locations and guidance:  [email or web address]

## 2023-01-27 ENCOUNTER — HOSPITAL ENCOUNTER (OUTPATIENT)
Facility: HOSPITAL | Age: 63
Discharge: HOME | End: 2023-02-02
Attending: EMERGENCY MEDICINE | Admitting: STUDENT IN AN ORGANIZED HEALTH CARE EDUCATION/TRAINING PROGRAM
Payer: MEDICAID

## 2023-01-27 DIAGNOSIS — J44.1 COPD WITH EXACERBATION: ICD-10-CM

## 2023-01-27 DIAGNOSIS — R56.9 SEIZURE: Primary | ICD-10-CM

## 2023-01-27 DIAGNOSIS — I50.20 HFREF (HEART FAILURE WITH REDUCED EJECTION FRACTION): ICD-10-CM

## 2023-01-27 DIAGNOSIS — R06.2 WHEEZING: ICD-10-CM

## 2023-01-27 DIAGNOSIS — R53.1 WEAKNESS: ICD-10-CM

## 2023-01-27 DIAGNOSIS — F10.10 ETOH ABUSE: ICD-10-CM

## 2023-01-27 DIAGNOSIS — J96.01 ACUTE HYPOXEMIC RESPIRATORY FAILURE: ICD-10-CM

## 2023-01-27 DIAGNOSIS — T50.905A DRUG-INDUCED LONG QT SYNDROME: ICD-10-CM

## 2023-01-27 DIAGNOSIS — I45.81 DRUG-INDUCED LONG QT SYNDROME: ICD-10-CM

## 2023-01-27 LAB
ALBUMIN SERPL-MCNC: 3.9 G/DL (ref 3.4–4.8)
ALBUMIN/GLOB SERPL: 1.2 RATIO (ref 1.1–2)
ALP SERPL-CCNC: 83 UNIT/L (ref 40–150)
ALT SERPL-CCNC: 13 UNIT/L (ref 0–55)
AST SERPL-CCNC: 18 UNIT/L (ref 5–34)
BASOPHILS # BLD AUTO: 0.05 X10(3)/MCL (ref 0–0.2)
BASOPHILS NFR BLD AUTO: 0.4 %
BILIRUBIN DIRECT+TOT PNL SERPL-MCNC: 0.2 MG/DL
BNP BLD-MCNC: 47.8 PG/ML
BUN SERPL-MCNC: 25.9 MG/DL (ref 8.4–25.7)
CALCIUM SERPL-MCNC: 9.7 MG/DL (ref 8.8–10)
CHLORIDE SERPL-SCNC: 100 MMOL/L (ref 98–107)
CK MB SERPL-MCNC: 4.4 NG/ML
CK SERPL-CCNC: 102 U/L (ref 30–200)
CO2 SERPL-SCNC: 29 MMOL/L (ref 23–31)
CREAT SERPL-MCNC: 0.84 MG/DL (ref 0.73–1.18)
EOSINOPHIL # BLD AUTO: 0.21 X10(3)/MCL (ref 0–0.9)
EOSINOPHIL NFR BLD AUTO: 1.7 %
ERYTHROCYTE [DISTWIDTH] IN BLOOD BY AUTOMATED COUNT: 13.5 % (ref 11.5–17)
FLUAV AG UPPER RESP QL IA.RAPID: NOT DETECTED
FLUBV AG UPPER RESP QL IA.RAPID: NOT DETECTED
GFR SERPLBLD CREATININE-BSD FMLA CKD-EPI: >60 MLS/MIN/1.73/M2
GLOBULIN SER-MCNC: 3.3 GM/DL (ref 2.4–3.5)
GLUCOSE SERPL-MCNC: 99 MG/DL (ref 82–115)
HCT VFR BLD AUTO: 40.6 % (ref 42–52)
HGB BLD-MCNC: 12.9 GM/DL (ref 14–18)
IMM GRANULOCYTES # BLD AUTO: 0.09 X10(3)/MCL (ref 0–0.04)
IMM GRANULOCYTES NFR BLD AUTO: 0.7 %
LACTATE SERPL-SCNC: 0.9 MMOL/L (ref 0.5–2.2)
LYMPHOCYTES # BLD AUTO: 2.39 X10(3)/MCL (ref 0.6–4.6)
LYMPHOCYTES NFR BLD AUTO: 19.5 %
MCH RBC QN AUTO: 30.9 PG
MCHC RBC AUTO-ENTMCNC: 31.8 MG/DL (ref 33–36)
MCV RBC AUTO: 97.1 FL (ref 80–94)
MONOCYTES # BLD AUTO: 0.68 X10(3)/MCL (ref 0.1–1.3)
MONOCYTES NFR BLD AUTO: 5.6 %
NEUTROPHILS # BLD AUTO: 8.82 X10(3)/MCL (ref 2.1–9.2)
NEUTROPHILS NFR BLD AUTO: 72.1 %
NRBC BLD AUTO-RTO: 0 %
PLATELET # BLD AUTO: 314 X10(3)/MCL (ref 130–400)
PMV BLD AUTO: 9.5 FL (ref 7.4–10.4)
POTASSIUM SERPL-SCNC: 4.1 MMOL/L (ref 3.5–5.1)
PROT SERPL-MCNC: 7.2 GM/DL (ref 5.8–7.6)
RBC # BLD AUTO: 4.18 X10(6)/MCL (ref 4.7–6.1)
SARS-COV-2 RNA RESP QL NAA+PROBE: NOT DETECTED
SODIUM SERPL-SCNC: 139 MMOL/L (ref 136–145)
TROPONIN I SERPL-MCNC: <0.01 NG/ML (ref 0–0.04)
WBC # SPEC AUTO: 12.2 X10(3)/MCL (ref 4.5–11.5)

## 2023-01-27 PROCEDURE — 84484 ASSAY OF TROPONIN QUANT: CPT | Performed by: PHYSICIAN ASSISTANT

## 2023-01-27 PROCEDURE — 11000001 HC ACUTE MED/SURG PRIVATE ROOM

## 2023-01-27 PROCEDURE — 25000003 PHARM REV CODE 250: Performed by: PHYSICIAN ASSISTANT

## 2023-01-27 PROCEDURE — 83605 ASSAY OF LACTIC ACID: CPT | Performed by: EMERGENCY MEDICINE

## 2023-01-27 PROCEDURE — 94761 N-INVAS EAR/PLS OXIMETRY MLT: CPT

## 2023-01-27 PROCEDURE — 80053 COMPREHEN METABOLIC PANEL: CPT | Performed by: PHYSICIAN ASSISTANT

## 2023-01-27 PROCEDURE — 82077 ASSAY SPEC XCP UR&BREATH IA: CPT | Performed by: INTERNAL MEDICINE

## 2023-01-27 PROCEDURE — 83880 ASSAY OF NATRIURETIC PEPTIDE: CPT | Performed by: EMERGENCY MEDICINE

## 2023-01-27 PROCEDURE — 96375 TX/PRO/DX INJ NEW DRUG ADDON: CPT

## 2023-01-27 PROCEDURE — 25000003 PHARM REV CODE 250: Performed by: EMERGENCY MEDICINE

## 2023-01-27 PROCEDURE — 94640 AIRWAY INHALATION TREATMENT: CPT

## 2023-01-27 PROCEDURE — 63600175 PHARM REV CODE 636 W HCPCS: Performed by: EMERGENCY MEDICINE

## 2023-01-27 PROCEDURE — 87040 BLOOD CULTURE FOR BACTERIA: CPT | Performed by: INTERNAL MEDICINE

## 2023-01-27 PROCEDURE — 25000242 PHARM REV CODE 250 ALT 637 W/ HCPCS: Performed by: PHYSICIAN ASSISTANT

## 2023-01-27 PROCEDURE — 0240U COVID/FLU A&B PCR: CPT | Performed by: PHYSICIAN ASSISTANT

## 2023-01-27 PROCEDURE — 82553 CREATINE MB FRACTION: CPT | Performed by: PHYSICIAN ASSISTANT

## 2023-01-27 PROCEDURE — 82550 ASSAY OF CK (CPK): CPT | Performed by: PHYSICIAN ASSISTANT

## 2023-01-27 PROCEDURE — 99285 EMERGENCY DEPT VISIT HI MDM: CPT | Mod: 25

## 2023-01-27 PROCEDURE — 85025 COMPLETE CBC W/AUTO DIFF WBC: CPT | Performed by: PHYSICIAN ASSISTANT

## 2023-01-27 PROCEDURE — 84443 ASSAY THYROID STIM HORMONE: CPT | Performed by: INTERNAL MEDICINE

## 2023-01-27 PROCEDURE — 27000221 HC OXYGEN, UP TO 24 HOURS

## 2023-01-27 PROCEDURE — 96361 HYDRATE IV INFUSION ADD-ON: CPT

## 2023-01-27 PROCEDURE — 93005 ELECTROCARDIOGRAM TRACING: CPT

## 2023-01-27 RX ORDER — THIAMINE HCL 100 MG
100 TABLET ORAL DAILY
Status: DISCONTINUED | OUTPATIENT
Start: 2023-01-28 | End: 2023-02-02 | Stop reason: HOSPADM

## 2023-01-27 RX ORDER — IPRATROPIUM BROMIDE AND ALBUTEROL SULFATE 2.5; .5 MG/3ML; MG/3ML
3 SOLUTION RESPIRATORY (INHALATION)
Status: COMPLETED | OUTPATIENT
Start: 2023-01-27 | End: 2023-01-27

## 2023-01-27 RX ORDER — ENOXAPARIN SODIUM 100 MG/ML
40 INJECTION SUBCUTANEOUS EVERY 24 HOURS
Status: DISCONTINUED | OUTPATIENT
Start: 2023-01-28 | End: 2023-02-02 | Stop reason: HOSPADM

## 2023-01-27 RX ORDER — LEVETIRACETAM 1000 MG/1
1000 TABLET ORAL 2 TIMES DAILY
Qty: 60 TABLET | Refills: 2 | Status: SHIPPED | OUTPATIENT
Start: 2023-01-27 | End: 2023-02-01 | Stop reason: HOSPADM

## 2023-01-27 RX ORDER — LEVOFLOXACIN 5 MG/ML
750 INJECTION, SOLUTION INTRAVENOUS
Status: DISCONTINUED | OUTPATIENT
Start: 2023-01-28 | End: 2023-01-31

## 2023-01-27 RX ORDER — LEVOFLOXACIN 5 MG/ML
750 INJECTION, SOLUTION INTRAVENOUS ONCE
Status: DISCONTINUED | OUTPATIENT
Start: 2023-01-27 | End: 2023-01-27

## 2023-01-27 RX ORDER — SODIUM CHLORIDE 0.9 % (FLUSH) 0.9 %
10 SYRINGE (ML) INJECTION
Status: DISCONTINUED | OUTPATIENT
Start: 2023-01-28 | End: 2023-02-02 | Stop reason: HOSPADM

## 2023-01-27 RX ORDER — LEVETIRACETAM 500 MG/1
500 TABLET ORAL ONCE
Status: COMPLETED | OUTPATIENT
Start: 2023-01-27 | End: 2023-01-27

## 2023-01-27 RX ORDER — TALC
6 POWDER (GRAM) TOPICAL NIGHTLY PRN
Status: DISCONTINUED | OUTPATIENT
Start: 2023-01-28 | End: 2023-02-02 | Stop reason: HOSPADM

## 2023-01-27 RX ORDER — METHYLPREDNISOLONE SOD SUCC 125 MG
125 VIAL (EA) INJECTION
Status: COMPLETED | OUTPATIENT
Start: 2023-01-27 | End: 2023-01-27

## 2023-01-27 RX ORDER — IPRATROPIUM BROMIDE AND ALBUTEROL SULFATE 2.5; .5 MG/3ML; MG/3ML
3 SOLUTION RESPIRATORY (INHALATION)
Status: DISCONTINUED | OUTPATIENT
Start: 2023-01-28 | End: 2023-02-02 | Stop reason: HOSPADM

## 2023-01-27 RX ORDER — HYDRALAZINE HYDROCHLORIDE 20 MG/ML
10 INJECTION INTRAMUSCULAR; INTRAVENOUS EVERY 4 HOURS PRN
Status: DISCONTINUED | OUTPATIENT
Start: 2023-01-27 | End: 2023-02-02 | Stop reason: HOSPADM

## 2023-01-27 RX ORDER — FOLIC ACID 1 MG/1
1 TABLET ORAL DAILY
Status: DISCONTINUED | OUTPATIENT
Start: 2023-01-28 | End: 2023-02-02 | Stop reason: HOSPADM

## 2023-01-27 RX ORDER — METHYLPREDNISOLONE SOD SUCC 125 MG
60 VIAL (EA) INJECTION
Status: COMPLETED | OUTPATIENT
Start: 2023-01-28 | End: 2023-01-31

## 2023-01-27 RX ORDER — CETIRIZINE HYDROCHLORIDE 10 MG/1
10 TABLET ORAL DAILY
Status: DISCONTINUED | OUTPATIENT
Start: 2023-01-28 | End: 2023-02-02 | Stop reason: HOSPADM

## 2023-01-27 RX ORDER — BUDESONIDE 0.5 MG/2ML
0.5 INHALANT ORAL EVERY 12 HOURS
Status: DISCONTINUED | OUTPATIENT
Start: 2023-01-28 | End: 2023-02-02 | Stop reason: HOSPADM

## 2023-01-27 RX ORDER — LEVETIRACETAM 500 MG/1
500 TABLET ORAL 2 TIMES DAILY
Status: DISCONTINUED | OUTPATIENT
Start: 2023-01-28 | End: 2023-02-02 | Stop reason: HOSPADM

## 2023-01-27 RX ADMIN — IPRATROPIUM BROMIDE AND ALBUTEROL SULFATE 3 ML: 2.5; .5 SOLUTION RESPIRATORY (INHALATION) at 09:01

## 2023-01-27 RX ADMIN — SODIUM CHLORIDE 1000 ML: 9 INJECTION, SOLUTION INTRAVENOUS at 10:01

## 2023-01-27 RX ADMIN — LEVETIRACETAM 500 MG: 500 TABLET, FILM COATED ORAL at 08:01

## 2023-01-27 RX ADMIN — SODIUM CHLORIDE 1000 ML: 9 INJECTION, SOLUTION INTRAVENOUS at 08:01

## 2023-01-27 RX ADMIN — LEVETIRACETAM 500 MG: 500 TABLET, FILM COATED ORAL at 09:01

## 2023-01-27 RX ADMIN — METHYLPREDNISOLONE SODIUM SUCCINATE 125 MG: 125 INJECTION, POWDER, FOR SOLUTION INTRAMUSCULAR; INTRAVENOUS at 10:01

## 2023-01-28 LAB
ALBUMIN SERPL-MCNC: 3.6 G/DL (ref 3.4–4.8)
ALBUMIN/GLOB SERPL: 1.2 RATIO (ref 1.1–2)
ALP SERPL-CCNC: 68 UNIT/L (ref 40–150)
ALT SERPL-CCNC: 12 UNIT/L (ref 0–55)
AMPHET UR QL SCN: NEGATIVE
APPEARANCE UR: CLEAR
AST SERPL-CCNC: 14 UNIT/L (ref 5–34)
BACTERIA #/AREA URNS AUTO: ABNORMAL /HPF
BARBITURATE SCN PRESENT UR: NEGATIVE
BASOPHILS # BLD AUTO: 0.02 X10(3)/MCL (ref 0–0.2)
BASOPHILS NFR BLD AUTO: 0.2 %
BENZODIAZ UR QL SCN: NEGATIVE
BILIRUB UR QL STRIP.AUTO: NEGATIVE MG/DL
BILIRUBIN DIRECT+TOT PNL SERPL-MCNC: 0.3 MG/DL
BNP BLD-MCNC: 63.9 PG/ML
BUN SERPL-MCNC: 17.4 MG/DL (ref 8.4–25.7)
CALCIUM SERPL-MCNC: 9.2 MG/DL (ref 8.8–10)
CANNABINOIDS UR QL SCN: NEGATIVE
CASTS URNS MICRO: ABNORMAL /LPF
CHLORIDE SERPL-SCNC: 107 MMOL/L (ref 98–107)
CO2 SERPL-SCNC: 24 MMOL/L (ref 23–31)
COCAINE UR QL SCN: NEGATIVE
COLOR UR AUTO: YELLOW
CREAT SERPL-MCNC: 0.82 MG/DL (ref 0.73–1.18)
EOSINOPHIL # BLD AUTO: 0 X10(3)/MCL (ref 0–0.9)
EOSINOPHIL NFR BLD AUTO: 0 %
ERYTHROCYTE [DISTWIDTH] IN BLOOD BY AUTOMATED COUNT: 13.5 % (ref 11.5–17)
EST. AVERAGE GLUCOSE BLD GHB EST-MCNC: 93.9 MG/DL
ETHANOL SERPL-MCNC: <10 MG/DL
FENTANYL UR QL SCN: NEGATIVE
GFR SERPLBLD CREATININE-BSD FMLA CKD-EPI: >60 MLS/MIN/1.73/M2
GLOBULIN SER-MCNC: 2.9 GM/DL (ref 2.4–3.5)
GLUCOSE SERPL-MCNC: 133 MG/DL (ref 82–115)
GLUCOSE UR QL STRIP.AUTO: NORMAL MG/DL
HAV IGM SERPL QL IA: NONREACTIVE
HBA1C MFR BLD: 4.9 %
HBV CORE IGM SERPL QL IA: NONREACTIVE
HBV SURFACE AG SERPL QL IA: NONREACTIVE
HCT VFR BLD AUTO: 37.1 % (ref 42–52)
HCV AB SERPL QL IA: NONREACTIVE
HGB BLD-MCNC: 11.4 GM/DL (ref 14–18)
HIV 1+2 AB+HIV1 P24 AG SERPL QL IA: NONREACTIVE
HYALINE CASTS #/AREA URNS LPF: ABNORMAL /LPF
IMM GRANULOCYTES # BLD AUTO: 0.07 X10(3)/MCL (ref 0–0.04)
IMM GRANULOCYTES NFR BLD AUTO: 0.8 %
KETONES UR QL STRIP.AUTO: NEGATIVE MG/DL
LEUKOCYTE ESTERASE UR QL STRIP.AUTO: NEGATIVE UNIT/L
LYMPHOCYTES # BLD AUTO: 0.53 X10(3)/MCL (ref 0.6–4.6)
LYMPHOCYTES NFR BLD AUTO: 5.9 %
MAGNESIUM SERPL-MCNC: 2 MG/DL (ref 1.6–2.6)
MCH RBC QN AUTO: 30.3 PG
MCHC RBC AUTO-ENTMCNC: 30.7 MG/DL (ref 33–36)
MCV RBC AUTO: 98.7 FL (ref 80–94)
MDMA UR QL SCN: NEGATIVE
MONOCYTES # BLD AUTO: 0.04 X10(3)/MCL (ref 0.1–1.3)
MONOCYTES NFR BLD AUTO: 0.4 %
MUCOUS THREADS URNS QL MICRO: ABNORMAL /LPF
NEUTROPHILS # BLD AUTO: 8.34 X10(3)/MCL (ref 2.1–9.2)
NEUTROPHILS NFR BLD AUTO: 92.7 %
NITRITE UR QL STRIP.AUTO: NEGATIVE
NRBC BLD AUTO-RTO: 0 %
OPIATES UR QL SCN: NEGATIVE
PCP UR QL: NEGATIVE
PH UR STRIP.AUTO: 5.5 [PH]
PH UR: 5.5 [PH] (ref 3–11)
PHOSPHATE SERPL-MCNC: 3.1 MG/DL (ref 2.3–4.7)
PLATELET # BLD AUTO: 263 X10(3)/MCL (ref 130–400)
PMV BLD AUTO: 9.1 FL (ref 7.4–10.4)
POTASSIUM SERPL-SCNC: 4.4 MMOL/L (ref 3.5–5.1)
PROT SERPL-MCNC: 6.5 GM/DL (ref 5.8–7.6)
PROT UR QL STRIP.AUTO: NEGATIVE MG/DL
RBC # BLD AUTO: 3.76 X10(6)/MCL (ref 4.7–6.1)
RBC #/AREA URNS AUTO: ABNORMAL /HPF
RBC UR QL AUTO: NEGATIVE UNIT/L
SODIUM SERPL-SCNC: 139 MMOL/L (ref 136–145)
SP GR UR STRIP.AUTO: 1.01
SPECIFIC GRAVITY, URINE AUTO (.000) (OHS): 1.01 (ref 1–1.03)
SQUAMOUS #/AREA URNS LPF: ABNORMAL /HPF
T PALLIDUM AB SER QL: NONREACTIVE
TSH SERPL-ACNC: 2.44 UIU/ML (ref 0.35–4.94)
UROBILINOGEN UR STRIP-ACNC: NORMAL MG/DL
WBC # SPEC AUTO: 9 X10(3)/MCL (ref 4.5–11.5)
WBC #/AREA URNS AUTO: ABNORMAL /HPF

## 2023-01-28 PROCEDURE — 87389 HIV-1 AG W/HIV-1&-2 AB AG IA: CPT | Performed by: INTERNAL MEDICINE

## 2023-01-28 PROCEDURE — 85025 COMPLETE CBC W/AUTO DIFF WBC: CPT | Performed by: INTERNAL MEDICINE

## 2023-01-28 PROCEDURE — 84100 ASSAY OF PHOSPHORUS: CPT | Performed by: INTERNAL MEDICINE

## 2023-01-28 PROCEDURE — 83735 ASSAY OF MAGNESIUM: CPT | Performed by: INTERNAL MEDICINE

## 2023-01-28 PROCEDURE — 93005 ELECTROCARDIOGRAM TRACING: CPT

## 2023-01-28 PROCEDURE — 83880 ASSAY OF NATRIURETIC PEPTIDE: CPT | Performed by: INTERNAL MEDICINE

## 2023-01-28 PROCEDURE — 83036 HEMOGLOBIN GLYCOSYLATED A1C: CPT | Performed by: INTERNAL MEDICINE

## 2023-01-28 PROCEDURE — 63600175 PHARM REV CODE 636 W HCPCS: Performed by: INTERNAL MEDICINE

## 2023-01-28 PROCEDURE — 25000003 PHARM REV CODE 250: Performed by: INTERNAL MEDICINE

## 2023-01-28 PROCEDURE — 11000001 HC ACUTE MED/SURG PRIVATE ROOM

## 2023-01-28 PROCEDURE — 94640 AIRWAY INHALATION TREATMENT: CPT

## 2023-01-28 PROCEDURE — 80074 ACUTE HEPATITIS PANEL: CPT | Performed by: INTERNAL MEDICINE

## 2023-01-28 PROCEDURE — 27000221 HC OXYGEN, UP TO 24 HOURS

## 2023-01-28 PROCEDURE — 80053 COMPREHEN METABOLIC PANEL: CPT | Performed by: INTERNAL MEDICINE

## 2023-01-28 PROCEDURE — 86780 TREPONEMA PALLIDUM: CPT | Performed by: INTERNAL MEDICINE

## 2023-01-28 PROCEDURE — 81001 URINALYSIS AUTO W/SCOPE: CPT | Mod: 59 | Performed by: INTERNAL MEDICINE

## 2023-01-28 PROCEDURE — 25000242 PHARM REV CODE 250 ALT 637 W/ HCPCS: Performed by: INTERNAL MEDICINE

## 2023-01-28 PROCEDURE — 80307 DRUG TEST PRSMV CHEM ANLYZR: CPT | Performed by: INTERNAL MEDICINE

## 2023-01-28 PROCEDURE — 94761 N-INVAS EAR/PLS OXIMETRY MLT: CPT

## 2023-01-28 RX ORDER — LORAZEPAM 2 MG/ML
2 INJECTION INTRAMUSCULAR
Status: DISCONTINUED | OUTPATIENT
Start: 2023-01-28 | End: 2023-02-02 | Stop reason: HOSPADM

## 2023-01-28 RX ADMIN — METHYLPREDNISOLONE SODIUM SUCCINATE 60 MG: 125 INJECTION, POWDER, FOR SOLUTION INTRAMUSCULAR; INTRAVENOUS at 09:01

## 2023-01-28 RX ADMIN — LEVETIRACETAM 500 MG: 500 TABLET, FILM COATED ORAL at 08:01

## 2023-01-28 RX ADMIN — FOLIC ACID 1 MG: 1 TABLET ORAL at 09:01

## 2023-01-28 RX ADMIN — MULTIPLE VITAMINS W/ MINERALS TAB 1 TABLET: TAB at 09:01

## 2023-01-28 RX ADMIN — IPRATROPIUM BROMIDE AND ALBUTEROL SULFATE 3 ML: 2.5; .5 SOLUTION RESPIRATORY (INHALATION) at 08:01

## 2023-01-28 RX ADMIN — BUDESONIDE 0.5 MG: 0.5 SUSPENSION RESPIRATORY (INHALATION) at 08:01

## 2023-01-28 RX ADMIN — ENOXAPARIN SODIUM 40 MG: 40 INJECTION SUBCUTANEOUS at 09:01

## 2023-01-28 RX ADMIN — THIAMINE HCL TAB 100 MG 100 MG: 100 TAB at 09:01

## 2023-01-28 RX ADMIN — IPRATROPIUM BROMIDE AND ALBUTEROL SULFATE 3 ML: 2.5; .5 SOLUTION RESPIRATORY (INHALATION) at 01:01

## 2023-01-28 RX ADMIN — CETIRIZINE HYDROCHLORIDE 10 MG: 10 TABLET ORAL at 09:01

## 2023-01-28 RX ADMIN — LEVETIRACETAM 500 MG: 500 TABLET, FILM COATED ORAL at 09:01

## 2023-01-28 RX ADMIN — METHYLPREDNISOLONE SODIUM SUCCINATE 60 MG: 125 INJECTION, POWDER, FOR SOLUTION INTRAMUSCULAR; INTRAVENOUS at 08:01

## 2023-01-28 NOTE — DISCHARGE INSTRUCTIONS
Take seizure medication daily as prescribed.   Follow up with your primary care provider within 2-3 days.

## 2023-01-28 NOTE — H&P
"[facility]      Resident Team: [facility]  Attending Physician: Reilly Collins MD  Resident: Saman Vasquez DO  Intern: Kemar/NIKI     Date of Admit: 1/27/2023    Chief Complaint     Seizures (Patient  lives  at  the homeless   [facility]helter.  He  had  a  witnessed   seizure.   When  [facility]  got  there  he  was    drooling   but   arousable  and  he  became  more  oriented.  On   arival   to  bed  9  he  is  awake, alert  and  oriented)       Subjective:      History of Present Illness:  This is a 62-year-old  male with past medical history a fractured skull (1989), atrial fibrillation, heart failure with reduced ejection fraction (EF 25% 2019), temporary trach for resp failure, seizure disorder, and polysubstance abuse who presents to the emergency department via EMS for presumed seizure at primary residence which is [facility]homeless shelter.  Patient does not recall having seizure but does admit to multiple hospitalizations in the past with most recent hospitalization January 5, 2023. Additioanlly, he states he was diagnosed with a seizure disorder approximately a year ago. However, through review of chart appears that patient has had several hospitalizations dating back to 2019. He states that he is compliant with his medications but then tells me that he has been out of his dilatin for 2 months now. However, per his chart he is on Keppra.  At time of evaluation patient complaining of minor headache.  Also endorses having shortness of breath and cough per the of green sputum which apparently has been ongoing for past 2 months.  Otherwise denies lightheadedness, dizziness, myalgia, arthralgia, chest pain.  He is a current tobacco user. Began smoking at the age of 22 (2-3 packs). Recently cut down to half a pack/day.   He does admit to alcohol use. States he was not a drinker rather a "swimmer" indicating massive consumption up to a liter/day. " Most recent drink was 3 days ago which was half a pint.  States he did every drug under the sun and is apparently has been sober for 3 months. Also endorsed remote IV drug use.     Hospital Course/Significant events:      24 Hour Interval History:      Allergies:  Review of patient's allergies indicates:   Allergen Reactions    Keflex [cephalexin]        Home Medications:  Prior to Admission medications    Medication Sig Start Date End Date Taking? Authorizing Provider   albuterol (PROVENTIL/VENTOLIN HFA) 90 mcg/actuation inhaler Inhale 1-2 puffs into the lungs every 6 (six) hours as needed for Wheezing or Shortness of Breath. Rescue 1/5/23 1/5/24  Delbert Fitzgerald MD   aspirin (ECOTRIN) 81 MG EC tablet Take 1 tablet (81 mg total) by mouth once daily. 10/15/19 10/14/20  Warner Ibarra MD   cetirizine (ZYRTEC) 10 MG tablet Take 1 tablet (10 mg total) by mouth once daily. 1/5/23 1/5/24  Delbert Fitzgerald MD   folic acid (FOLVITE) 1 MG tablet Take 1 tablet (1 mg total) by mouth once daily. 1/11/21 2/11/21  Maykel Rosales MD   ibuprofen (ADVIL,MOTRIN) 600 MG tablet Take 1 tablet (600 mg total) by mouth every 6 (six) hours as needed for Pain. 1/5/23   Delbert Fitzgerald MD   levETIRAcetam (KEPPRA) 1000 MG tablet Take 1 tablet (1,000 mg total) by mouth 2 (two) times daily. 1/27/23 2/26/23  YAMINI Smith   lisinopril (PRINIVIL,ZESTRIL) 2.5 MG tablet Take 1 tablet (2.5 mg total) by mouth once daily. 10/15/19 10/14/20  Warner Ibarra MD   multivitamin (THERAGRAN) per tablet Take 1 tablet by mouth once daily. 7/7/19   Kristyn Mckeon MD       Review of Systems:  Review of Systems   Constitutional:  Negative for chills and fever.   Eyes:  Negative for blurred vision and double vision.   Respiratory:  Positive for cough, sputum production, shortness of breath and wheezing.    Cardiovascular:  Negative for chest pain and palpitations.   Gastrointestinal:  Negative for abdominal pain, nausea  and vomiting.   Genitourinary:  Negative for dysuria.   Musculoskeletal:  Negative for joint pain and myalgias.   Neurological:  Positive for headaches. Negative for dizziness, tingling, sensory change and weakness.       Objective:   Last 24 Hour Vital Signs:  BP  Min: 83/64  Max: 110/77  Temp  Av.2 °F (36.8 °C)  Min: 98.2 °F (36.8 °C)  Max: 98.2 °F (36.8 °C)  Pulse  Av.3  Min: 85  Max: 99  Resp  Av.8  Min: 15  Max: 20  SpO2  Av %  Min: 94 %  Max: 98 %  Weight  Av kg (152 lb 1.9 oz)  Min: 69 kg (152 lb 1.9 oz)  Max: 69 kg (152 lb 1.9 oz)  Body mass index is 22.46 kg/m².  No intake/output data recorded.    Physical Examination:  Physical Exam  Vitals and nursing note reviewed.   Constitutional:       General: He is not in acute distress.     Appearance: He is normal weight. He is not ill-appearing or toxic-appearing.      Interventions: Nasal cannula in place.   HENT:      Head: Normocephalic and atraumatic.      Mouth/Throat:      Mouth: Mucous membranes are moist.      Pharynx: Oropharynx is clear.   Eyes:      General: No scleral icterus.     Extraocular Movements: Extraocular movements intact.      Conjunctiva/sclera: Conjunctivae normal.      Pupils: Pupils are equal, round, and reactive to light.   Neck:      Comments: Left clavicular mass mildly tender to palpation  Cardiovascular:      Rate and Rhythm: Normal rate and regular rhythm.      Pulses: Normal pulses.      Heart sounds: Normal heart sounds. No murmur heard.    No gallop.   Pulmonary:      Effort: Pulmonary effort is normal. No respiratory distress.      Breath sounds: No stridor. Wheezing present. No rhonchi or rales.   Abdominal:      Palpations: Abdomen is soft.   Musculoskeletal:         General: No swelling.      Cervical back: Normal range of motion.      Right lower leg: No edema.      Left lower leg: No edema.   Skin:     General: Skin is warm and dry.   Neurological:      Mental Status: He is alert and oriented to  person, place, and time.        Laboratory:  Most Recent Data:  CBC:   Lab Results   Component Value Date    WBC 12.2 (H) 01/27/2023    HGB 12.9 (L) 01/27/2023    HCT 40.6 (L) 01/27/2023     01/27/2023    MCV 97.1 (H) 01/27/2023    RDW 13.5 01/27/2023     WBC Differential:   Recent Labs   Lab 01/27/23 2014   WBC 12.2*   HGB 12.9*   HCT 40.6*      MCV 97.1*     BMP:   Lab Results   Component Value Date     01/27/2023    K 4.1 01/27/2023     12/29/2022    CO2 29 01/27/2023    BUN 25.9 (H) 01/27/2023    CREATININE 0.84 01/27/2023    GLU 82 12/29/2022    CALCIUM 9.7 01/27/2023    MG 2.1 12/29/2022     LFTs:   Lab Results   Component Value Date    PROT 6.4 12/29/2022    ALBUMIN 3.9 01/27/2023    BILITOT 0.2 01/27/2023    AST 18 01/27/2023    ALKPHOS 83 01/27/2023    ALT 13 01/27/2023     Coags:   Lab Results   Component Value Date    INR 1.0 12/29/2022     FLP: No results found for: CHOL, HDL, LDLCALC, TRIG, CHOLHDL  DM:   Lab Results   Component Value Date    CREATININE 0.84 01/27/2023     Thyroid:   Lab Results   Component Value Date    TSH 0.622 12/29/2022      Anemia: No results found for: IRON, TIBC, FERRITIN, SATURATEDIRO  No results found for: PNOYAUGF84  No results found for: FOLATE     Cardiac:   Lab Results   Component Value Date    TROPONINI <0.010 01/27/2023    BNP 47.8 01/27/2023     Urinalysis:   Lab Results   Component Value Date    COLORU Yellow 01/11/2021    SPECGRAV 1.020 01/11/2021    NITRITE Negative 01/11/2021    KETONESU 1+ (A) 01/11/2021    UROBILINOGEN 2.0-3.0 (A) 01/11/2021       Trended Lab Data:  Recent Labs   Lab 01/27/23 2014   WBC 12.2*   HGB 12.9*   HCT 40.6*      MCV 97.1*   RDW 13.5      K 4.1   CO2 29   BUN 25.9*   CREATININE 0.84   ALBUMIN 3.9   BILITOT 0.2   AST 18   ALKPHOS 83   ALT 13       Trended Cardiac Data:  Recent Labs   Lab 01/27/23  2101 01/27/23  2132   TROPONINI <0.010  --    BNP  --  47.8       Microbiology Data:  Microbiology  Results (last 7 days)       Procedure Component Value Units Date/Time    Blood culture x two cultures. Draw prior to antibiotics. [[id number]] Collected: 01/27/23 2320    Order Status: Sent Specimen: Blood from Hand, Left Updated: 01/27/23 2323    Blood culture x two cultures. Draw prior to antibiotics. [[id number]] Collected: 01/27/23 2311    Order Status: Sent Specimen: Blood from Hand, Right Updated: 01/27/23 2323             Other Results:  EKG (my interpretation): EKG pending this morning    Radiology:  I have reviewed all pertinent imaging within the past 24 hours.    Assessment & Plan:   Seizure disorder  -Fall/aspiration/seizure precautions in place. Neuro check q4.  -Keppra 500 BID    Alcohol abuse/Polysubstance abuse  -CIWA assessment. Lorazepam 2mg for CIWA >8  -Multivitamin, thiamine, and folic acid daily  -Pending alcohol level and UDS    COPD/Asthma  -Solumederol 60mg q12h with duoneb and budesonide  -Would ideally like to start on Rocephin and Azithro for possible CAP. Allergic to Keflex so will continue Levaquin. Will get resp culture  -On 3L NC. Wean off    Afib  -History of Atrial Fibrillation. Supposed to be on Dig but not currently on Digoxin. Also supposed to be on Eliquis but not taking currently.   -EKG pending    HFrEF  -Had Echo in 2019 with severely decreased left ventricular systolic function with estimated ejection fraction at 20%.  Additional finding of severe global hypokinesis with inferior posterior lateral wall motion abnormalities.    -Not currently on BB//Aldosterone-I/Diuretic. Only on Lisinopril 2.5? Holding with labile BP  -Order ECHO and BNP    CODE STATUS:Full Code  Access: PIV  Antibiotics: Levaquin  Diet: Diet Soft & Bite Sized   DVT Prophylaxis: Lovenox ppx  GI Prophylaxis: Protonix  Fluids: NONe        Saman Vasquez DO  [facility]

## 2023-01-28 NOTE — ED PROVIDER NOTES
Encounter Date: 1/27/2023       History     Chief Complaint   Patient presents with    Seizures     Patient  lives  at  the homeless   shelter.  He  had  a  witnessed   seizure.   When  [facility]  got  there  he  was    drooling   but   arousable  and  he  became  more  oriented.  On   arival   to  bed  9  he  is  awake, alert  and  oriented     Patient is a 62 year old male, lives in homeless shelter, with hx of seizures, COPD, and asthma who presents to the emergency department after having a witnessed seizure prior to arrival.   [facility] states on arrival he was drooling but quickly became more oriented.   On Arrival, he is awake, alert and oriented.  He states he has been out of his seizure medication for a few days.  He is unsure of how long he was seizing and unsure of his activity when it occurred.  He states he does not remember anything however feels fine.   He is wheezing and has productive cough but does admit to daily cigarette smoking.  Upon arrival he was placed on nasal canula.  He denies SOB, headache, generalized weakness, dizziness, vision changes, nausea, vomiting, CP.      The history is provided by the patient. No  was used.   Seizures   This is a recurrent problem. The current episode started just prior to arrival. The problem has been rapidly improving. Number of times: unknown. Associated symptoms include cough. Pertinent negatives include no sleepiness, no confusion, no headaches, no speech difficulty, no visual disturbance, no neck stiffness, no sore throat, no chest pain, no nausea, no vomiting, no diarrhea and no muscle weakness. unknown The episode was Witnessed. The seizures Did not continue in the ED.   Review of patient's allergies indicates:   Allergen Reactions    Keflex [cephalexin]      Past Medical History:   Diagnosis Date    Asthma     COPD (chronic obstructive pulmonary disease)     Seizures      No past surgical history on file.  No family history  on file.  Social History     Tobacco Use    Smoking status: Every Day     Packs/day: 0.50     Types: Cigarettes    Smokeless tobacco: Never   Substance Use Topics    Alcohol use: Yes     Comment: daily (pint of vodka per day)    Drug use: Yes     Types: Marijuana     Comment: daily     Review of Systems   Constitutional:  Negative for chills and fever.   HENT:  Negative for drooling, facial swelling and sore throat.    Eyes:  Negative for photophobia, pain, discharge, redness, itching and visual disturbance.   Respiratory:  Positive for cough and wheezing. Negative for shortness of breath.    Cardiovascular:  Negative for chest pain and palpitations.   Gastrointestinal:  Negative for abdominal pain, diarrhea, nausea and vomiting.   Genitourinary:  Negative for dysuria and flank pain.   Musculoskeletal:  Negative for back pain and neck pain.   Skin:  Negative for rash and wound.   Neurological:  Positive for seizures. Negative for dizziness, speech difficulty, weakness, light-headedness and headaches.   Hematological:  Negative for adenopathy. Does not bruise/bleed easily.   Psychiatric/Behavioral:  Negative for confusion.      Physical Exam     Initial Vitals [01/27/23 1947]   BP Pulse Resp Temp SpO2   110/77 90 20 98.2 °F (36.8 °C) 95 %      MAP       --         Physical Exam    Nursing note and vitals reviewed.  Constitutional: He appears well-developed and well-nourished.   HENT:   Head: Normocephalic and atraumatic.   Nose: Nose normal.   Mouth/Throat: Oropharynx is clear and moist.   Eyes: Conjunctivae and EOM are normal. Pupils are equal, round, and reactive to light.   Neck: Neck supple.   Normal range of motion.  Cardiovascular:  Normal rate, normal heart sounds and intact distal pulses.           Pulmonary/Chest: He has wheezes (diffuse).   Abdominal: Abdomen is soft. Bowel sounds are normal. There is no abdominal tenderness.   Musculoskeletal:         General: Normal range of motion.      Cervical back:  Normal range of motion and neck supple.     Lymphadenopathy:     He has no cervical adenopathy.   Neurological: He is alert and oriented to person, place, and time. He has normal strength. No cranial nerve deficit. GCS score is 15. GCS eye subscore is 4. GCS verbal subscore is 5. GCS motor subscore is 6.   Skin: Skin is warm. Capillary refill takes less than 2 seconds.       ED Course   Procedures  Labs Reviewed   COMPREHENSIVE METABOLIC PANEL - Abnormal; Notable for the following components:       Result Value    Blood Urea Nitrogen 25.9 (*)     All other components within normal limits   CBC WITH DIFFERENTIAL - Abnormal; Notable for the following components:    WBC 12.2 (*)     RBC 4.18 (*)     Hgb 12.9 (*)     Hct 40.6 (*)     MCV 97.1 (*)     MCHC 31.8 (*)     IG# 0.09 (*)     All other components within normal limits   CK - Normal   COVID/FLU A&B PCR - Normal    Narrative:     The Xpert Xpress SARS-CoV-2/FLU/RSV plus is a rapid, multiplexed real-time PCR test intended for the simultaneous qualitative detection and differentiation of SARS-CoV-2, Influenza A, Influenza B, and respiratory syncytial virus (RSV) viral RNA in either nasopharyngeal swab or nasal swab specimens.         CK-MB - Normal   TROPONIN I - Normal   B-TYPE NATRIURETIC PEPTIDE - Normal   BLOOD CULTURE OLG   BLOOD CULTURE OLG   CBC W/ AUTO DIFFERENTIAL    Narrative:     The following orders were created for panel order CBC Auto Differential.  Procedure                               Abnormality         Status                     ---------                               -----------         ------                     CBC with Differential[[id number]]        Abnormal            Final result                 Please view results for these tests on the individual orders.   EXTRA TUBES    Narrative:     The following orders were created for panel order EXTRA TUBES.  Procedure                               Abnormality         Status                      ---------                               -----------         ------                     Light Blue Top Hold[[id number]]                              In process                 Red Top Hold[[id number]]                                     In process                 Gold Top Hold[[id number]]                                    In process                 Pink Top Hold[[id number]]                                    In process                   Please view results for these tests on the individual orders.   LIGHT BLUE TOP HOLD   RED TOP HOLD   GOLD TOP HOLD   PINK TOP HOLD   LACTIC ACID, PLASMA   URINALYSIS, REFLEX TO URINE CULTURE     EKG Readings: (Independently Interpreted)   Initial Reading: No STEMI. Heart Rate: 100.   Sinus Rhythm with occasional PVCs     Imaging Results              X-Ray Chest PA And Lateral (Final result)  Result time 01/27/23 22:30:03      Final result by Tio Deras MD (01/27/23 22:30:03)                   Impression:      No acute findings in the chest      Electronically signed by: Tio Deras MD  Date:    01/27/2023  Time:    22:30               Narrative:    EXAMINATION:  XR CHEST PA AND LATERAL    CLINICAL HISTORY:  Wheezing    COMPARISON:  01/05/2023    FINDINGS:  PA and lateral views of the chest show pulmonary hyperinflation without focal consolidation, pneumothorax or pleural effusion.  Cardiac silhouette and pulmonary vasculature are normal.  No acute osseous findings.                        ED Interpretation by Leo May DO (01/27/23 22:22:34, [facility], Emergency Medicine)    Emphysematous changes, no dense lobar consolidation.                                     Medications   sodium chloride 0.9% bolus 1,000 mL 1,000 mL (1,000 mLs Intravenous New Bag 1/27/23 2232)   levoFLOXacin 750 mg/150 mL IVPB 750 mg (has no administration in time range)   sodium chloride 0.9% bolus 1,000 mL 1,000 mL (0 mLs Intravenous Stopped 1/27/23 2117)   levETIRAcetam  tablet 500 mg (500 mg Oral Given 23)   albuterol-ipratropium 2.5 mg-0.5 mg/3 mL nebulizer solution 3 mL (3 mLs Nebulization Given 23)   levETIRAcetam tablet 500 mg (500 mg Oral Given 23)   methylPREDNISolone sodium succinate injection 125 mg (125 mg Intravenous Given 23)     Medical Decision Making:   Initial Assessment:   Patient is a 62 year old male, lives in homeless shelter, with hx of seizures, COPD, and asthma who presents to the emergency department after having a witnessed seizure prior to arrival.  He has complaints of cough and wheezing   Clinical Tests:   Lab Tests: Ordered and Reviewed  Radiological Study: Ordered and Reviewed  Medical Tests: Ordered and Reviewed  ED Management:  1,000 mg Keppra, 1 L IV fluid,and duo neb given in ED.            Attending Attestation:     Physician Attestation Statement for NP/PA:   I have conducted a face to face encounter with this patient in addition to the NP/PA, due to NP/PA Request    Other NP/PA Attestation Additions:    History of Present Illness: 62-year-old male presents after having a witnessed seizure, reports that he has history of seizures and has been out of his Keppra for some period of time.  He also reports that for the past week or so he is noticed a cough productive of greenish colored sputum along with some wheezing, states he normally uses an inhaler but has been out of this as well.  History is limited due to He is a poor historian due to having short-term memory loss.  Denies having chest pain, fevers, vomiting, diarrhea.   Physical Exam: Dry mucous membranes.  Lungs significantly diminished bilateral with diffuse expiratory wheezing.    Oriented to person, knows he is at a hospital, can name the state but is not sure which town he is in, states current POTUS is Obama. Non focal neuro exam.   Medical Decision Makin yo male presents after having witnessed seizure, apparently has been out of his keppra.  He also reports having cough and wheezing but no chest pain or shortness of breath or fever. States that he has COPD but has also been out of his inhaler. He was given a dose of keppra in the ED along with duoneb and solumedrol. CBC shows leukocytosis of 12.2, possibly stress-induced. Will get CXR to rule out pneumonia.    CXR shows no acute intrathoracic process. Wheezing resolved with duoneb. Still requiring supplemental oxygen. BUN/Creatine ratio 30 suggesting dehydration which I suspect is etiology for his low BP. However, will empirically treat for occult pneumonia with levaquin and admit for further medical evaluation and treatment. I have spoken with the patient and/or caregivers. I have explained the patient's condition, diagnoses and treatment plan based on the information available to me at this time. I have answered the patient's and/or caregiver's questions and addressed any concerns. The patient and/or caregivers have as good an understanding of the patient's diagnosis, condition and treatment plan as can be expected at this point. The patient has been stabilized within the capability of the emergency department. The patient will be transported for further care and management or will be moved to an observation or inpatient service. I have communicated with the staff or medical practitioner taking over this patient's care.            ED Course as of 01/27/23 2313 Fri Jan 27, 2023 2116 I transitioned this patient to Dr. May at this time.   [ER]   2259 Reports duoneb helped. Lung sounds still diminished but wheezing has resolved. Supplemental oxygen turned off and he desaturated to 89%. Will empirically treat for pneumonia and admit for further medical evaluation. [IB]      ED Course User Index  [ER] YAMINI Smith  [IB] Leo May DO                 Clinical Impression:   Final diagnoses:  [R53.1] Weakness  [R56.9] Seizure  [R06.2] Wheezing  [J44.1] COPD with exacerbation  (Primary)  [J96.01] Acute hypoxemic respiratory failure        ED Disposition Condition    Admit Stable                Leo May DO  01/27/23 0675

## 2023-01-29 LAB
ALBUMIN SERPL-MCNC: 3.4 G/DL (ref 3.4–4.8)
ALBUMIN/GLOB SERPL: 1.3 RATIO (ref 1.1–2)
ALP SERPL-CCNC: 61 UNIT/L (ref 40–150)
ALT SERPL-CCNC: 10 UNIT/L (ref 0–55)
AST SERPL-CCNC: 10 UNIT/L (ref 5–34)
BASOPHILS # BLD AUTO: 0.03 X10(3)/MCL (ref 0–0.2)
BASOPHILS NFR BLD AUTO: 0.2 %
BILIRUBIN DIRECT+TOT PNL SERPL-MCNC: 0.2 MG/DL
BUN SERPL-MCNC: 19.5 MG/DL (ref 8.4–25.7)
CALCIUM SERPL-MCNC: 9.4 MG/DL (ref 8.8–10)
CHLORIDE SERPL-SCNC: 104 MMOL/L (ref 98–107)
CO2 SERPL-SCNC: 27 MMOL/L (ref 23–31)
CREAT SERPL-MCNC: 0.73 MG/DL (ref 0.73–1.18)
EOSINOPHIL # BLD AUTO: 0 X10(3)/MCL (ref 0–0.9)
EOSINOPHIL NFR BLD AUTO: 0 %
ERYTHROCYTE [DISTWIDTH] IN BLOOD BY AUTOMATED COUNT: 13.9 % (ref 11.5–17)
GFR SERPLBLD CREATININE-BSD FMLA CKD-EPI: >60 MLS/MIN/1.73/M2
GLOBULIN SER-MCNC: 2.7 GM/DL (ref 2.4–3.5)
GLUCOSE SERPL-MCNC: 142 MG/DL (ref 82–115)
HCT VFR BLD AUTO: 35.7 % (ref 42–52)
HGB BLD-MCNC: 11.4 GM/DL (ref 14–18)
IMM GRANULOCYTES # BLD AUTO: 0.12 X10(3)/MCL (ref 0–0.04)
IMM GRANULOCYTES NFR BLD AUTO: 0.8 %
LYMPHOCYTES # BLD AUTO: 0.85 X10(3)/MCL (ref 0.6–4.6)
LYMPHOCYTES NFR BLD AUTO: 5.7 %
MAGNESIUM SERPL-MCNC: 2.1 MG/DL (ref 1.6–2.6)
MCH RBC QN AUTO: 31 PG
MCHC RBC AUTO-ENTMCNC: 31.9 MG/DL (ref 33–36)
MCV RBC AUTO: 97 FL (ref 80–94)
MONOCYTES # BLD AUTO: 0.36 X10(3)/MCL (ref 0.1–1.3)
MONOCYTES NFR BLD AUTO: 2.4 %
NEUTROPHILS # BLD AUTO: 13.64 X10(3)/MCL (ref 2.1–9.2)
NEUTROPHILS NFR BLD AUTO: 90.9 %
NRBC BLD AUTO-RTO: 0 %
PHOSPHATE SERPL-MCNC: 3.5 MG/DL (ref 2.3–4.7)
PLATELET # BLD AUTO: 263 X10(3)/MCL (ref 130–400)
PMV BLD AUTO: 9.6 FL (ref 7.4–10.4)
POTASSIUM SERPL-SCNC: 4.5 MMOL/L (ref 3.5–5.1)
PROT SERPL-MCNC: 6.1 GM/DL (ref 5.8–7.6)
RBC # BLD AUTO: 3.68 X10(6)/MCL (ref 4.7–6.1)
SODIUM SERPL-SCNC: 140 MMOL/L (ref 136–145)
WBC # SPEC AUTO: 15 X10(3)/MCL (ref 4.5–11.5)

## 2023-01-29 PROCEDURE — 94640 AIRWAY INHALATION TREATMENT: CPT

## 2023-01-29 PROCEDURE — 63600175 PHARM REV CODE 636 W HCPCS

## 2023-01-29 PROCEDURE — 85025 COMPLETE CBC W/AUTO DIFF WBC: CPT | Performed by: INTERNAL MEDICINE

## 2023-01-29 PROCEDURE — 27000221 HC OXYGEN, UP TO 24 HOURS

## 2023-01-29 PROCEDURE — 25000242 PHARM REV CODE 250 ALT 637 W/ HCPCS: Performed by: INTERNAL MEDICINE

## 2023-01-29 PROCEDURE — 63600175 PHARM REV CODE 636 W HCPCS: Performed by: INTERNAL MEDICINE

## 2023-01-29 PROCEDURE — 83735 ASSAY OF MAGNESIUM: CPT | Performed by: INTERNAL MEDICINE

## 2023-01-29 PROCEDURE — 84100 ASSAY OF PHOSPHORUS: CPT | Performed by: INTERNAL MEDICINE

## 2023-01-29 PROCEDURE — 94761 N-INVAS EAR/PLS OXIMETRY MLT: CPT

## 2023-01-29 PROCEDURE — 87070 CULTURE OTHR SPECIMN AEROBIC: CPT | Performed by: INTERNAL MEDICINE

## 2023-01-29 PROCEDURE — 80053 COMPREHEN METABOLIC PANEL: CPT | Performed by: INTERNAL MEDICINE

## 2023-01-29 PROCEDURE — 21400001 HC TELEMETRY ROOM

## 2023-01-29 PROCEDURE — 25000003 PHARM REV CODE 250: Performed by: INTERNAL MEDICINE

## 2023-01-29 RX ORDER — LIDOCAINE 50 MG/G
2 PATCH TOPICAL
Status: DISCONTINUED | OUTPATIENT
Start: 2023-01-29 | End: 2023-02-02 | Stop reason: HOSPADM

## 2023-01-29 RX ADMIN — THIAMINE HCL TAB 100 MG 100 MG: 100 TAB at 08:01

## 2023-01-29 RX ADMIN — ENOXAPARIN SODIUM 40 MG: 40 INJECTION SUBCUTANEOUS at 05:01

## 2023-01-29 RX ADMIN — IPRATROPIUM BROMIDE AND ALBUTEROL SULFATE 3 ML: 2.5; .5 SOLUTION RESPIRATORY (INHALATION) at 08:01

## 2023-01-29 RX ADMIN — LEVETIRACETAM 500 MG: 500 TABLET, FILM COATED ORAL at 08:01

## 2023-01-29 RX ADMIN — MULTIPLE VITAMINS W/ MINERALS TAB 1 TABLET: TAB at 08:01

## 2023-01-29 RX ADMIN — BUDESONIDE 0.5 MG: 0.5 SUSPENSION RESPIRATORY (INHALATION) at 07:01

## 2023-01-29 RX ADMIN — SODIUM CHLORIDE, POTASSIUM CHLORIDE, SODIUM LACTATE AND CALCIUM CHLORIDE 250 ML: 600; 310; 30; 20 INJECTION, SOLUTION INTRAVENOUS at 12:01

## 2023-01-29 RX ADMIN — IPRATROPIUM BROMIDE AND ALBUTEROL SULFATE 3 ML: 2.5; .5 SOLUTION RESPIRATORY (INHALATION) at 01:01

## 2023-01-29 RX ADMIN — CETIRIZINE HYDROCHLORIDE 10 MG: 10 TABLET ORAL at 08:01

## 2023-01-29 RX ADMIN — FOLIC ACID 1 MG: 1 TABLET ORAL at 08:01

## 2023-01-29 RX ADMIN — BUDESONIDE 0.5 MG: 0.5 SUSPENSION RESPIRATORY (INHALATION) at 08:01

## 2023-01-29 RX ADMIN — METHYLPREDNISOLONE SODIUM SUCCINATE 60 MG: 125 INJECTION, POWDER, FOR SOLUTION INTRAMUSCULAR; INTRAVENOUS at 08:01

## 2023-01-29 RX ADMIN — LEVOFLOXACIN 750 MG: 750 INJECTION, SOLUTION INTRAVENOUS at 02:01

## 2023-01-29 RX ADMIN — IPRATROPIUM BROMIDE AND ALBUTEROL SULFATE 3 ML: 2.5; .5 SOLUTION RESPIRATORY (INHALATION) at 07:01

## 2023-01-29 RX ADMIN — LEVETIRACETAM 500 MG: 500 TABLET, FILM COATED ORAL at 09:01

## 2023-01-29 RX ADMIN — SODIUM CHLORIDE, POTASSIUM CHLORIDE, SODIUM LACTATE AND CALCIUM CHLORIDE 500 ML: 600; 310; 30; 20 INJECTION, SOLUTION INTRAVENOUS at 09:01

## 2023-01-29 RX ADMIN — METHYLPREDNISOLONE SODIUM SUCCINATE 60 MG: 125 INJECTION, POWDER, FOR SOLUTION INTRAMUSCULAR; INTRAVENOUS at 09:01

## 2023-01-29 RX ADMIN — LIDOCAINE 2 PATCH: 50 PATCH TOPICAL at 08:01

## 2023-01-29 NOTE — PLAN OF CARE
Problem: Adult Inpatient Plan of Care  Goal: Plan of Care Review  Outcome: Ongoing, Progressing  Goal: Patient-Specific Goal (Individualized)  Outcome: Ongoing, Progressing  Goal: Absence of Hospital-Acquired Illness or Injury  Outcome: Ongoing, Progressing  Goal: Optimal Comfort and Wellbeing  Outcome: Ongoing, Progressing  Goal: Readiness for Transition of Care  Outcome: Ongoing, Progressing     Problem: Fall Injury Risk  Goal: Absence of Fall and Fall-Related Injury  Outcome: Ongoing, Progressing     Problem: Violence Risk or Actual  Goal: Anger and Impulse Control  Outcome: Ongoing, Progressing

## 2023-01-29 NOTE — NURSING
Dr. Terry notified of pt BP 85/57 in left arm and /42 in right arm after 250ml LR bolus. Advised this nurse to elevate pt legs and continue to monitor.

## 2023-01-29 NOTE — PROGRESS NOTES
"[facility] Medicine Wards      Resident Team: [facility] Medicine List 1  Attending Physician: Reilly Collins MD  Resident: Saman Vasquez DO  Intern: Kemar/NIKI     Date of Admit: 1/27/2023    Chief Complaint     Seizures (Patient  lives  at  the homeless   shelter.  He  had  a  witnessed   seizure.   When  [facility]  got  there  he  was    drooling   but   arousable  and  he  became  more  oriented.  On   arival   to  bed  9  he  is  awake, alert  and  oriented)       Subjective:      History of Present Illness:  This is a 62-year-old  male with past medical history a fractured skull (1989), atrial fibrillation, heart failure with reduced ejection fraction (EF 25% 2019), temporary trach for resp failure, seizure disorder, and polysubstance abuse who presents to the emergency department via EMS for presumed seizure at primary residence which is [facility]homeless shelter.  Patient does not recall having seizure but does admit to multiple hospitalizations in the past with most recent hospitalization January 5, 2023. Additioanlly, he states he was diagnosed with a seizure disorder approximately a year ago. However, through review of chart appears that patient has had several hospitalizations dating back to 2019. He states that he is compliant with his medications but then tells me that he has been out of his dilatin for 2 months now. However, per his chart he is on Keppra.  At time of evaluation patient complaining of minor headache.  Also endorses having shortness of breath and cough per the of green sputum which apparently has been ongoing for past 2 months.  Otherwise denies lightheadedness, dizziness, myalgia, arthralgia, chest pain.  He is a current tobacco user. Began smoking at the age of 22 (2-3 packs). Recently cut down to half a pack/day.   He does admit to alcohol use. States he was not a drinker rather a "swimmer" indicating massive consumption up to a liter/day. " Most recent drink was 3 days ago which was half a pint.  States he did every drug under the sun and is apparently has been sober for 3 months. Also endorsed remote IV drug use.     Hospital Course/Significant events:      24 Hour Interval History:  No acute events overnight. VSS. Afebrile Tmax 36.9c. No seizures overnight. Leukocytosis this morning but is on steroids. H/H stable. Electrolytes within normal limits. Awake, alert, and oriented. No acute complaints aside from muscle strain (left neck).     Allergies:  Review of patient's allergies indicates:   Allergen Reactions    Keflex [cephalexin]        Home Medications:  Prior to Admission medications    Medication Sig Start Date End Date Taking? Authorizing Provider   albuterol (PROVENTIL/VENTOLIN HFA) 90 mcg/actuation inhaler Inhale 1-2 puffs into the lungs every 6 (six) hours as needed for Wheezing or Shortness of Breath. Rescue 1/5/23 1/5/24  Delbert Fitzgerald MD   aspirin (ECOTRIN) 81 MG EC tablet Take 1 tablet (81 mg total) by mouth once daily. 10/15/19 10/14/20  Warner Ibarra MD   cetirizine (ZYRTEC) 10 MG tablet Take 1 tablet (10 mg total) by mouth once daily. 1/5/23 1/5/24  Delbert Fitzgerald MD   folic acid (FOLVITE) 1 MG tablet Take 1 tablet (1 mg total) by mouth once daily. 1/11/21 2/11/21  Maykel Rosales MD   ibuprofen (ADVIL,MOTRIN) 600 MG tablet Take 1 tablet (600 mg total) by mouth every 6 (six) hours as needed for Pain. 1/5/23   Delbert Fitzgerald MD   levETIRAcetam (KEPPRA) 1000 MG tablet Take 1 tablet (1,000 mg total) by mouth 2 (two) times daily. 1/27/23 2/26/23  YAMINI Smith   lisinopril (PRINIVIL,ZESTRIL) 2.5 MG tablet Take 1 tablet (2.5 mg total) by mouth once daily. 10/15/19 10/14/20  Warner Ibarra MD   multivitamin (THERAGRAN) per tablet Take 1 tablet by mouth once daily. 7/7/19   Kristyn Mckeon MD       Review of Systems:  Review of Systems   Constitutional:  Negative for chills and fever.   Eyes:   "Negative for blurred vision and double vision.   Respiratory:  Positive for cough and sputum production. Negative for shortness of breath and wheezing.    Cardiovascular:  Negative for chest pain and palpitations.   Gastrointestinal:  Negative for abdominal pain, nausea and vomiting.   Genitourinary:  Negative for dysuria.   Musculoskeletal:  Negative for joint pain and myalgias.   Neurological:  Negative for dizziness, tingling, sensory change, weakness and headaches.       Objective:   Last 24 Hour Vital Signs:  BP  Min: 76/55  Max: 118/72  Temp  Av.2 °F (36.8 °C)  Min: 98.1 °F (36.7 °C)  Max: 98.4 °F (36.9 °C)  Pulse  Av.2  Min: 67  Max: 85  Resp  Av.5  Min: 17  Max: 20  SpO2  Av %  Min: 93 %  Max: 99 %  Height  Av' 9" (175.3 cm)  Min: 5' 9" (175.3 cm)  Max: 5' 9" (175.3 cm)  Weight  Av.9 kg (147 lb 8 oz)  Min: 66.9 kg (147 lb 8 oz)  Max: 66.9 kg (147 lb 8 oz)  Body mass index is 21.78 kg/m².  I/O last 3 completed shifts:  In:  [IV Piggyback:]  Out: 3325 [Urine:3325]    Physical Examination:  Physical Exam  Vitals and nursing note reviewed.   Constitutional:       General: He is not in acute distress.     Appearance: He is normal weight. He is not ill-appearing or toxic-appearing.      Interventions: Nasal cannula in place.   HENT:      Head: Normocephalic and atraumatic.      Mouth/Throat:      Mouth: Mucous membranes are moist.      Pharynx: Oropharynx is clear.   Eyes:      General: No scleral icterus.     Extraocular Movements: Extraocular movements intact.      Conjunctiva/sclera: Conjunctivae normal.      Pupils: Pupils are equal, round, and reactive to light.   Neck:      Comments: Left clavicular mass mildly tender to palpation  Cardiovascular:      Rate and Rhythm: Normal rate and regular rhythm.      Pulses: Normal pulses.      Heart sounds: Normal heart sounds. No murmur heard.    No gallop.   Pulmonary:      Effort: Pulmonary effort is normal. No respiratory " distress.      Breath sounds: No stridor. Wheezing present. No rhonchi or rales.   Abdominal:      Palpations: Abdomen is soft.   Musculoskeletal:         General: No swelling.      Cervical back: Normal range of motion.      Right lower leg: No edema.      Left lower leg: No edema.   Skin:     General: Skin is warm and dry.   Neurological:      Mental Status: He is alert and oriented to person, place, and time.        Laboratory:  Most Recent Data:  CBC:   Lab Results   Component Value Date    WBC 15.0 (H) 01/29/2023    HGB 11.4 (L) 01/29/2023    HCT 35.7 (L) 01/29/2023     01/29/2023    MCV 97.0 (H) 01/29/2023    RDW 13.9 01/29/2023     WBC Differential:   Recent Labs   Lab 01/27/23 2014 01/28/23  0505 01/29/23  0301   WBC 12.2* 9.0 15.0*   HGB 12.9* 11.4* 11.4*   HCT 40.6* 37.1* 35.7*    263 263   MCV 97.1* 98.7* 97.0*       BMP:   Lab Results   Component Value Date     01/29/2023    K 4.5 01/29/2023     12/29/2022    CO2 27 01/29/2023    BUN 19.5 01/29/2023    CREATININE 0.73 01/29/2023    GLU 82 12/29/2022    CALCIUM 9.4 01/29/2023    MG 2.10 01/29/2023    PHOS 3.5 01/29/2023     LFTs:   Lab Results   Component Value Date    PROT 6.4 12/29/2022    ALBUMIN 3.4 01/29/2023    BILITOT 0.2 01/29/2023    AST 10 01/29/2023    ALKPHOS 61 01/29/2023    ALT 10 01/29/2023     Coags:   Lab Results   Component Value Date    INR 1.0 12/29/2022     FLP: No results found for: CHOL, HDL, LDLCALC, TRIG, CHOLHDL  DM:   Lab Results   Component Value Date    HGBA1C 4.9 01/28/2023    CREATININE 0.73 01/29/2023     Thyroid:   Lab Results   Component Value Date    TSH 2.440 01/27/2023      Anemia: No results found for: IRON, TIBC, FERRITIN, SATURATEDIRO  No results found for: [id number]  No results found for: FOLATE     Cardiac:   Lab Results   Component Value Date    TROPONINI <0.010 01/27/2023    BNP 63.9 01/28/2023     Urinalysis:   Lab Results   Component Value Date    COLORU Yellow 01/11/2021    PHUA  5.5 01/28/2023    SPECGRAV 1.020 01/11/2021    NITRITE Negative 01/11/2021    KETONESU 1+ (A) 01/11/2021    UROBILINOGEN Normal 01/28/2023    WBCUA 0-5 01/28/2023       Trended Lab Data:  Recent Labs   Lab 01/27/23 2014 01/28/23  0505 01/29/23  0301 01/29/23  0302   WBC 12.2* 9.0 15.0*  --    HGB 12.9* 11.4* 11.4*  --    HCT 40.6* 37.1* 35.7*  --     263 263  --    MCV 97.1* 98.7* 97.0*  --    RDW 13.5 13.5 13.9  --     139  --  140   K 4.1 4.4  --  4.5   CO2 29 24  --  27   BUN 25.9* 17.4  --  19.5   CREATININE 0.84 0.82  --  0.73   ALBUMIN 3.9 3.6  --  3.4   BILITOT 0.2 0.3  --  0.2   AST 18 14  --  10   ALKPHOS 83 68  --  61   ALT 13 12  --  10         Trended Cardiac Data:  Recent Labs   Lab 01/27/23  2101 01/27/23  2132 01/28/23  0009   TROPONINI <0.010  --   --    BNP  --  47.8 63.9         Microbiology Data:  Microbiology Results (last 7 days)       Procedure Component Value Units Date/Time    Respiratory Culture [[id number]]     Order Status: Sent Specimen: Sputum, Expectorated     Blood culture x two cultures. Draw prior to antibiotics. [[id number]] Collected: 01/27/23 2320    Order Status: Resulted Specimen: Blood from Hand, Left Updated: 01/27/23 2323    Blood culture x two cultures. Draw prior to antibiotics. [[id number]] Collected: 01/27/23 2311    Order Status: Resulted Specimen: Blood from Hand, Right Updated: 01/27/23 2323             Other Results:  EKG (my interpretation): EKG pending this morning    Radiology:  I have reviewed all pertinent imaging within the past 24 hours.    Assessment & Plan:   Seizure disorder  -Fall/aspiration/seizure precautions in place. Neuro check q4.  -Keppra 500 BID    Alcohol abuse/Polysubstance abuse  -CIWA assessment. Lorazepam 2mg for CIWA >8  -Multivitamin, thiamine, and folic acid daily    COPD/Asthma  -Solumederol 60mg q12h with duoneb and budesonide  -Conitnue Levaquin for 4 additional days  -On 2L NC. Wean off    Afib  -History of Atrial  Fibrillation. Supposed to be on Dig but not currently on Digoxin. Also supposed to be on Eliquis but not taking currently.   -EKG NSR. Holter at discharge    HFrEF  -Had Echo in 2019 with severely decreased left ventricular systolic function with estimated ejection fraction at 20%.  Additional finding of severe global hypokinesis with inferior posterior lateral wall motion abnormalities.    -Not currently on BB//Aldosterone-I/Diuretic. Only on Lisinopril 2.5? Holding with labile BP  -ECHo Monday    CODE STATUS:Full Code  Access: PIV  Antibiotics: Levaquin  Diet: Diet Soft & Bite Sized   DVT Prophylaxis: Lovenox ppx  GI Prophylaxis: Protonix  Fluids: NONe    Cardiac work up pending for Monday. Lidocain patch for muscle strain. Holter monitor needed.       Saman Vasquez DO  [facility] Internal Medicine [id number]

## 2023-01-29 NOTE — NURSING
Dr. Terry notified of pt current BP 78/44 HR 82. Notified him of pre LR bolus BP 90/60 and post LR bolus /66.

## 2023-01-30 LAB
ALBUMIN SERPL-MCNC: 3.3 G/DL (ref 3.4–4.8)
ALBUMIN/GLOB SERPL: 1.2 RATIO (ref 1.1–2)
ALP SERPL-CCNC: 58 UNIT/L (ref 40–150)
ALT SERPL-CCNC: 12 UNIT/L (ref 0–55)
AST SERPL-CCNC: 12 UNIT/L (ref 5–34)
AV INDEX (PROSTH): 0.4
AV MEAN GRADIENT: 10 MMHG
AV PEAK GRADIENT: 16 MMHG
AV VALVE AREA: 1.23 CM2
AV VELOCITY RATIO: 0.36
BASOPHILS # BLD AUTO: 0.01 X10(3)/MCL (ref 0–0.2)
BASOPHILS NFR BLD AUTO: 0.1 %
BILIRUBIN DIRECT+TOT PNL SERPL-MCNC: 0.3 MG/DL
BSA FOR ECHO PROCEDURE: 1.8 M2
BUN SERPL-MCNC: 16.8 MG/DL (ref 8.4–25.7)
CALCIUM SERPL-MCNC: 9.3 MG/DL (ref 8.8–10)
CHLORIDE SERPL-SCNC: 103 MMOL/L (ref 98–107)
CO2 SERPL-SCNC: 31 MMOL/L (ref 23–31)
CREAT SERPL-MCNC: 0.84 MG/DL (ref 0.73–1.18)
CV ECHO LV RWT: 0.17 CM
DOP CALC AO PEAK VEL: 2.02 M/S
DOP CALC AO VTI: 41 CM
DOP CALC LVOT AREA: 3 CM2
DOP CALC LVOT DIAMETER: 1.97 CM
DOP CALC LVOT PEAK VEL: 0.72 M/S
DOP CALC LVOT STROKE VOLUME: 50.27 CM3
DOP CALC MV VTI: 25.7 CM
DOP CALCLVOT PEAK VEL VTI: 16.5 CM
E WAVE DECELERATION TIME: 236.12 MSEC
E/A RATIO: 0.62
ECHO LV POSTERIOR WALL: 0.63 CM (ref 0.6–1.1)
EJECTION FRACTION: 35 %
EOSINOPHIL # BLD AUTO: 0 X10(3)/MCL (ref 0–0.9)
EOSINOPHIL NFR BLD AUTO: 0 %
ERYTHROCYTE [DISTWIDTH] IN BLOOD BY AUTOMATED COUNT: 14 % (ref 11.5–17)
FRACTIONAL SHORTENING: -151 % (ref 28–44)
GFR SERPLBLD CREATININE-BSD FMLA CKD-EPI: >60 MLS/MIN/1.73/M2
GLOBULIN SER-MCNC: 2.7 GM/DL (ref 2.4–3.5)
GLUCOSE SERPL-MCNC: 124 MG/DL (ref 82–115)
HCT VFR BLD AUTO: 37.9 % (ref 42–52)
HGB BLD-MCNC: 12 GM/DL (ref 14–18)
HR MV ECHO: 72 BPM
IMM GRANULOCYTES # BLD AUTO: 0.11 X10(3)/MCL (ref 0–0.04)
IMM GRANULOCYTES NFR BLD AUTO: 0.8 %
INTERVENTRICULAR SEPTUM: 1.01 CM (ref 0.6–1.1)
LEFT ATRIUM SIZE: 3.69 CM
LEFT INTERNAL DIMENSION IN SYSTOLE: 18.1 CM (ref 2.1–4)
LEFT VENTRICLE DIASTOLIC VOLUME INDEX: 150.64 ML/M2
LEFT VENTRICLE DIASTOLIC VOLUME: 272.65 ML
LEFT VENTRICLE MASS INDEX: 147 G/M2
LEFT VENTRICLE SYSTOLIC VOLUME INDEX: 100 ML/M2
LEFT VENTRICLE SYSTOLIC VOLUME: 181.01 ML
LEFT VENTRICULAR INTERNAL DIMENSION IN DIASTOLE: 7.21 CM (ref 3.5–6)
LEFT VENTRICULAR MASS: 265.47 G
LV LATERAL E/E' RATIO: 10.67 M/S
LVOT MG: 1.19 MMHG
LVOT MV: 0.52 CM/S
LYMPHOCYTES # BLD AUTO: 0.9 X10(3)/MCL (ref 0.6–4.6)
LYMPHOCYTES NFR BLD AUTO: 6.9 %
MAGNESIUM SERPL-MCNC: 1.9 MG/DL (ref 1.6–2.6)
MCH RBC QN AUTO: 30.6 PG
MCHC RBC AUTO-ENTMCNC: 31.7 MG/DL (ref 33–36)
MCV RBC AUTO: 96.7 FL (ref 80–94)
MONOCYTES # BLD AUTO: 0.23 X10(3)/MCL (ref 0.1–1.3)
MONOCYTES NFR BLD AUTO: 1.8 %
MV MEAN GRADIENT: 2 MMHG
MV PEAK A VEL: 1.04 M/S
MV PEAK E VEL: 0.64 M/S
MV PEAK GRADIENT: 3 MMHG
MV STENOSIS PRESSURE HALF TIME: 68.47 MS
MV VALVE AREA BY CONTINUITY EQUATION: 1.96 CM2
MV VALVE AREA P 1/2 METHOD: 3.21 CM2
NEUTROPHILS # BLD AUTO: 11.87 X10(3)/MCL (ref 2.1–9.2)
NEUTROPHILS NFR BLD AUTO: 90.4 %
NRBC BLD AUTO-RTO: 0 %
PHOSPHATE SERPL-MCNC: 4.2 MG/DL (ref 2.3–4.7)
PISA MRMAX VEL: 5.17 M/S
PISA TR MAX VEL: 1.64 M/S
PLATELET # BLD AUTO: 261 X10(3)/MCL (ref 130–400)
PMV BLD AUTO: 9.3 FL (ref 7.4–10.4)
POTASSIUM SERPL-SCNC: 4.3 MMOL/L (ref 3.5–5.1)
PROT SERPL-MCNC: 6 GM/DL (ref 5.8–7.6)
RA WIDTH: 4.8 CM
RBC # BLD AUTO: 3.92 X10(6)/MCL (ref 4.7–6.1)
SODIUM SERPL-SCNC: 140 MMOL/L (ref 136–145)
TDI LATERAL: 0.06 M/S
TR MAX PG: 11 MMHG
TRICUSPID ANNULAR PLANE SYSTOLIC EXCURSION: 1.81 CM
WBC # SPEC AUTO: 13.1 X10(3)/MCL (ref 4.5–11.5)

## 2023-01-30 PROCEDURE — 63600175 PHARM REV CODE 636 W HCPCS: Performed by: INTERNAL MEDICINE

## 2023-01-30 PROCEDURE — 80053 COMPREHEN METABOLIC PANEL: CPT | Performed by: INTERNAL MEDICINE

## 2023-01-30 PROCEDURE — 96376 TX/PRO/DX INJ SAME DRUG ADON: CPT

## 2023-01-30 PROCEDURE — 94640 AIRWAY INHALATION TREATMENT: CPT | Mod: XB

## 2023-01-30 PROCEDURE — 84100 ASSAY OF PHOSPHORUS: CPT | Performed by: INTERNAL MEDICINE

## 2023-01-30 PROCEDURE — G0378 HOSPITAL OBSERVATION PER HR: HCPCS

## 2023-01-30 PROCEDURE — 27000221 HC OXYGEN, UP TO 24 HOURS

## 2023-01-30 PROCEDURE — 25000003 PHARM REV CODE 250: Performed by: INTERNAL MEDICINE

## 2023-01-30 PROCEDURE — 96372 THER/PROPH/DIAG INJ SC/IM: CPT | Performed by: INTERNAL MEDICINE

## 2023-01-30 PROCEDURE — 25000242 PHARM REV CODE 250 ALT 637 W/ HCPCS: Performed by: INTERNAL MEDICINE

## 2023-01-30 PROCEDURE — 93005 ELECTROCARDIOGRAM TRACING: CPT

## 2023-01-30 PROCEDURE — 94761 N-INVAS EAR/PLS OXIMETRY MLT: CPT

## 2023-01-30 PROCEDURE — 83735 ASSAY OF MAGNESIUM: CPT | Performed by: INTERNAL MEDICINE

## 2023-01-30 PROCEDURE — 85025 COMPLETE CBC W/AUTO DIFF WBC: CPT | Performed by: INTERNAL MEDICINE

## 2023-01-30 RX ADMIN — LEVOFLOXACIN 750 MG: 750 INJECTION, SOLUTION INTRAVENOUS at 12:01

## 2023-01-30 RX ADMIN — LIDOCAINE 2 PATCH: 50 PATCH TOPICAL at 07:01

## 2023-01-30 RX ADMIN — IPRATROPIUM BROMIDE AND ALBUTEROL SULFATE 3 ML: 2.5; .5 SOLUTION RESPIRATORY (INHALATION) at 07:01

## 2023-01-30 RX ADMIN — METHYLPREDNISOLONE SODIUM SUCCINATE 60 MG: 125 INJECTION, POWDER, FOR SOLUTION INTRAMUSCULAR; INTRAVENOUS at 09:01

## 2023-01-30 RX ADMIN — BUDESONIDE 0.5 MG: 0.5 SUSPENSION RESPIRATORY (INHALATION) at 07:01

## 2023-01-30 RX ADMIN — ENOXAPARIN SODIUM 40 MG: 40 INJECTION SUBCUTANEOUS at 05:01

## 2023-01-30 RX ADMIN — LEVETIRACETAM 500 MG: 500 TABLET, FILM COATED ORAL at 08:01

## 2023-01-30 RX ADMIN — METHYLPREDNISOLONE SODIUM SUCCINATE 60 MG: 125 INJECTION, POWDER, FOR SOLUTION INTRAMUSCULAR; INTRAVENOUS at 08:01

## 2023-01-30 RX ADMIN — LEVETIRACETAM 500 MG: 500 TABLET, FILM COATED ORAL at 09:01

## 2023-01-30 RX ADMIN — MULTIPLE VITAMINS W/ MINERALS TAB 1 TABLET: TAB at 08:01

## 2023-01-30 RX ADMIN — FOLIC ACID 1 MG: 1 TABLET ORAL at 08:01

## 2023-01-30 RX ADMIN — CETIRIZINE HYDROCHLORIDE 10 MG: 10 TABLET ORAL at 08:01

## 2023-01-30 RX ADMIN — THIAMINE HCL TAB 100 MG 100 MG: 100 TAB at 08:01

## 2023-01-30 RX ADMIN — IPRATROPIUM BROMIDE AND ALBUTEROL SULFATE 3 ML: 2.5; .5 SOLUTION RESPIRATORY (INHALATION) at 01:01

## 2023-01-30 NOTE — PLAN OF CARE
01/30/23 1254   Discharge Assessment   Assessment Type Discharge Planning Assessment   Confirmed/corrected address, phone number and insurance Yes   Confirmed Demographics Correct on Facesheet   Source of Information patient   When was your last doctors appointment?   ([facility])   Reason For Admission Wheezing, seizure, weakness, COPD exacerbation   People in Home   (Lives at a Shelter ([facility] [phone])   Facility Arrived From: [facility] [phone]   Do you expect to return to your current living situation? Yes   Do you have help at home or someone to help you manage your care at home? Yes   Who are your caregiver(s) and their phone number(s)? [facility] [phone]   Prior to hospitilization cognitive status: Alert/Oriented   Current cognitive status: Alert/Oriented   Equipment Currently Used at Home none   Patient currently being followed by outpatient case management? No   Do you currently have service(s) that help you manage your care at home? No   Do you take prescription medications? Yes   Do you have prescription coverage? Yes   Coverage MEDICAID - [employer] ([facility])   Do you have any problems affording any of your prescribed medications? No   Is the patient taking medications as prescribed? no   If no, which medications is patient not taking? Donis   Who is going to help you get home at discharge? Does not havre family in this area   How do you get to doctors appointments? agency   Are you on dialysis? No   Do you take coumadin? No   Discharge Plan A Shelter   DME Needed Upon Discharge  none   Discharge Plan discussed with: Patient   Discharge Barriers Identified Homeless   Physical Activity   On average, how many days per week do you engage in moderate to strenuous exercise (like a brisk walk)? 0 days   On average, how many minutes do you engage in exercise at this level? 0 min   Financial Resource  Strain   How hard is it for you to pay for the very basics like food, housing, medical care, and heating? Somewhat   Housing Stability   In the last 12 months, was there a time when you were not able to pay the mortgage or rent on time? Y   In the last 12 months, how many places have you lived? 2   In the last 12 months, was there a time when you did not have a steady place to sleep or slept in a shelter (including now)? Y   Transportation Needs   In the past 12 months, has lack of transportation kept you from medical appointments or from getting medications? yes   In the past 12 months, has lack of transportation kept you from meetings, work, or from getting things needed for daily living? Yes   Food Insecurity   Within the past 12 months, you worried that your food would run out before you got the money to buy more. Sometimes   Within the past 12 months, the food you bought just didn't last and you didn't have money to get more. Sometimes   Stress   Do you feel stress - tense, restless, nervous, or anxious, or unable to sleep at night because your mind is troubled all the time - these days? Not at all   Social Connections   In a typical week, how many times do you talk on the phone with family, friends, or neighbors? Once a week   How often do you get together with friends or relatives? Once   How often do you attend Anabaptist or Islam services? Never   Do you belong to any clubs or organizations such as Anabaptist groups, unions, fraternal or athletic groups, or school groups? No   How often do you attend meetings of the clubs or organizations you belong to? Never   Are you , , , , never , or living with a partner? Never marrie   Alcohol Use   Q1: How often do you have a drink containing alcohol? Never   Q2: How many drinks containing alcohol do you have on a typical day when you are drinking? None   Q3: How often do you have six or more drinks on one occasion? Never     Pt has  been living in a homeless shelter for about a month ([facility] [phone]) He has a brother(Yamil) [phone] who lives in [place] but they do not speak to one another. [place] referral submitted to [employer] via [facility] for homelessness, food insecurities, and possibly getting a cell phone.

## 2023-01-30 NOTE — PLAN OF CARE
Problem: Adult Inpatient Plan of Care  Goal: Plan of Care Review  Outcome: Ongoing, Progressing  Goal: Patient-Specific Goal (Individualized)  Outcome: Ongoing, Progressing  Goal: Absence of Hospital-Acquired Illness or Injury  Outcome: Ongoing, Progressing  Goal: Optimal Comfort and Wellbeing  Outcome: Ongoing, Progressing  Goal: Readiness for Transition of Care  Outcome: Ongoing, Progressing     Problem: Fall Injury Risk  Goal: Absence of Fall and Fall-Related Injury  Outcome: Ongoing, Progressing     Problem: Violence Risk or Actual  Goal: Anger and Impulse Control  Outcome: Ongoing, Progressing      Prescription sent for xanax

## 2023-01-30 NOTE — PLAN OF CARE
Problem: Fall Injury Risk  Goal: Absence of Fall and Fall-Related Injury  Outcome: Ongoing, Progressing  Intervention: Promote Injury-Free Environment  Flowsheets (Taken 1/29/2023 1803)  Safety Promotion/Fall Prevention:   assistive device/personal item within reach   Fall Risk reviewed with patient/family   bed alarm set   medications reviewed   supervised activity   toileting scheduled   instructed to call staff for mobility   side rails raised x 2

## 2023-01-30 NOTE — PROGRESS NOTES
"[facility] Progress Note     Resident Team: [facility] Medicine List       Subjective:      Brief HPI:  This is a 62-year-old  male with past medical history a fractured skull (1989), atrial fibrillation, HFrEF (EF 25% 2019), temporary trach for resp failure, seizure disorder, and polysubstance abuse who presents to the emergency department via EMS for presumed seizure at primary residence which is [facility].  Patient does not recall having seizure but does admit to multiple hospitalizations in the past with most recent hospitalization January 5, 2023. Additioanlly, he states he was diagnosed with a seizure disorder approximately a year ago. However, through review of chart appears that patient has had several hospitalizations dating back to 2019. He states that he is compliant with his medications but then tells me that he has been out of his dilatin for 2 months now. However, per his chart he is on Keppra.  At time of evaluation patient complaining of minor headache.  Also endorses having shortness of breath and cough per the of green sputum which apparently has been ongoing for past 2 months.  Otherwise denies lightheadedness, dizziness, myalgia, arthralgia, chest pain.  He is a current tobacco user. Began smoking at the age of 22 (2-3 packs). Recently cut down to half a pack/day.   He does admit to alcohol use. States he was not a drinker rather a "swimmer" indicating massive consumption up to a liter/day. Most recent drink was 3 days ago which was half a pint.  States he did every drug under the sun and is apparently has been sober for 3 months. Also endorsed remote IV drug use.     Interval History:   No acute events overnight. VSS. Afebrile Tmax 36.9c. No seizures overnight. Leukocytosis this morning improved from yesterday on Solu-medrol. H/H stable. Electrolytes within normal limits. Awake, alert, and oriented. No acute complaints at this time.     Review of " Systems:  Review of Systems   Constitutional:  Negative for chills and fever.   Eyes:  Negative for blurred vision and double vision.   Respiratory:  Positive for cough. Negative for shortness of breath and wheezing.    Cardiovascular:  Negative for chest pain and palpitations.   Gastrointestinal:  Negative for abdominal pain, nausea and vomiting.   Genitourinary:  Negative for dysuria.   Musculoskeletal:  Negative for joint pain and myalgias.   Neurological:  Negative for dizziness, tingling, sensory change, weakness and headaches.      Objective:     Last 24 Hour Vital Signs:  BP  Min: 78/44  Max: 114/66  Temp  Av.4 °F (36.9 °C)  Min: 97.9 °F (36.6 °C)  Max: 98.9 °F (37.2 °C)  Pulse  Av.1  Min: 65  Max: 90  Resp  Av.2  Min: 18  Max: 20  SpO2  Av.7 %  Min: 94 %  Max: 99 %  I/O last 3 completed shifts:  In: 620 [P.O.:120; IV Piggyback:500]  Out: 1700 [Urine:1700]    Physical Examination:  Physical Exam  Vitals and nursing note reviewed.   Constitutional:       General: He is not in acute distress.     Appearance: He is normal weight. He is not ill-appearing or toxic-appearing.      Interventions: Nasal cannula in place.   HENT:      Head: Normocephalic and atraumatic.      Mouth/Throat:      Mouth: Mucous membranes are moist.      Pharynx: Oropharynx is clear.   Eyes:      General: No scleral icterus.     Extraocular Movements: Extraocular movements intact.      Conjunctiva/sclera: Conjunctivae normal.      Pupils: Pupils are equal, round, and reactive to light.   Neck:      Comments: Left clavicular mass mildly tender to palpation  Cardiovascular:      Rate and Rhythm: Normal rate and regular rhythm.      Pulses: Normal pulses.      Heart sounds: Normal heart sounds. No murmur heard.    No gallop.   Pulmonary:      Effort: Pulmonary effort is normal. No respiratory distress.      Breath sounds: No stridor. Wheezing present. No rhonchi or rales.   Abdominal:      Palpations: Abdomen is soft.    Musculoskeletal:         General: No swelling.      Cervical back: Normal range of motion.      Right lower leg: No edema.      Left lower leg: No edema.   Skin:     General: Skin is warm and dry.   Neurological:      Mental Status: He is alert and oriented to person, place, and time.     Laboratory:  Most Recent Data:  CBC:   Lab Results   Component Value Date    WBC 13.1 (H) 01/30/2023    HGB 12.0 (L) 01/30/2023    HCT 37.9 (L) 01/30/2023     01/30/2023    MCV 96.7 (H) 01/30/2023    RDW 14.0 01/30/2023     WBC Differential:   Recent Labs   Lab 01/27/23  2014 01/28/23  0505 01/29/23  0301 01/30/23  0513   WBC 12.2* 9.0 15.0* 13.1*   HGB 12.9* 11.4* 11.4* 12.0*   HCT 40.6* 37.1* 35.7* 37.9*    263 263 261   MCV 97.1* 98.7* 97.0* 96.7*     BMP:   Lab Results   Component Value Date     01/30/2023    K 4.3 01/30/2023     12/29/2022    CO2 31 01/30/2023    BUN 16.8 01/30/2023    CREATININE 0.84 01/30/2023    GLU 82 12/29/2022    CALCIUM 9.3 01/30/2023    MG 1.90 01/30/2023    PHOS 4.2 01/30/2023     LFTs:   Lab Results   Component Value Date    PROT 6.4 12/29/2022    ALBUMIN 3.3 (L) 01/30/2023    BILITOT 0.3 01/30/2023    AST 12 01/30/2023    ALKPHOS 58 01/30/2023    ALT 12 01/30/2023     Coags:   Lab Results   Component Value Date    INR 1.0 12/29/2022   DM:   Lab Results   Component Value Date    HGBA1C 4.9 01/28/2023    CREATININE 0.84 01/30/2023     Thyroid:   Lab Results   Component Value Date    TSH 2.440 01/27/2023     Cardiac:   Lab Results   Component Value Date    TROPONINI <0.010 01/27/2023    BNP 63.9 01/28/2023         Microbiology Data:  Microbiology Results (last 7 days)       Procedure Component Value Units Date/Time    Respiratory Culture [[id number]] Collected: 01/29/23 1215    Order Status: Completed Specimen: Sputum, Expectorated Updated: 01/30/23 0725     Respiratory Culture Normal respiratory negin     GRAM STAIN Quality 1+      Moderate Gram positive cocci      Few Gram  Positive Rods      Few Gram Negative Rods    Blood culture x two cultures. Draw prior to antibiotics. [[id number]]  (Normal) Collected: 01/27/23 2311    Order Status: Completed Specimen: Blood from Hand, Right Updated: 01/29/23 0800     CULTURE, BLOOD ([facility]) No Growth At 24 Hours    Blood culture x two cultures. Draw prior to antibiotics. [[id number]]  (Normal) Collected: 01/27/23 2320    Order Status: Completed Specimen: Blood from Hand, Left Updated: 01/29/23 0800     CULTURE, BLOOD ([facility]) No Growth At 24 Hours             Radiology:  Imaging Results              X-Ray Chest PA And Lateral (Final result)  Result time 01/27/23 22:30:03      Final result by Tio Deras MD (01/27/23 22:30:03)                   Impression:      No acute findings in the chest      Electronically signed by: Tio Deras MD  Date:    01/27/2023  Time:    22:30               Narrative:    EXAMINATION:  XR CHEST PA AND LATERAL    CLINICAL HISTORY:  Wheezing    COMPARISON:  01/05/2023    FINDINGS:  PA and lateral views of the chest show pulmonary hyperinflation without focal consolidation, pneumothorax or pleural effusion.  Cardiac silhouette and pulmonary vasculature are normal.  No acute osseous findings.                        ED Interpretation by Leo May DO (01/27/23 22:22:34, [facility], Emergency Medicine)    Emphysematous changes, no dense lobar consolidation.                                    Current Medications:     Infusions:       Scheduled:   albuterol-ipratropium  3 mL Nebulization Q6H WAKE    budesonide  0.5 mg Nebulization Q12H    cetirizine  10 mg Oral Daily    enoxaparin  40 mg Subcutaneous Daily    folic acid  1 mg Oral Daily    levETIRAcetam  500 mg Oral BID    levoFLOXacin  750 mg Intravenous Q24H    LIDOcaine  2 patch Transdermal Q24H    methylPREDNISolone sodium succinate injection  60 mg Intravenous Q12H    multivitamin  1 tablet Oral Daily    thiamine  100 mg Oral Daily         PRN:  hydrALAZINE, lorazepam, melatonin, sodium chloride 0.9%    Antibiotics and Day Number of Therapy:  Levofloxacin 3    Assessment & Plan:     Seizure disorder  Fall/aspiration/seizure precautions in place. Neuro check q4.  Keppra 500 BID    Alcohol abuse/Polysubstance abuse  CIWA assessment. Lorazepam 2mg for CIWA >8  Multivitamin, thiamine, and folic acid daily    COPD/Asthma  Solumederol 60mg q12h with duoneb q6hrs while awake and budesonide 0.5 mg  BID  Weaned off 2 L NC; currently sat at 94% on room air  Currently receiving Levaquin; ordered ECG; will change abx regiment if Qtc prolonged    A. fib  History of A. Fib. Previously on Digoxin and Eliquis but currently not taking  EKG NSR. Holter at discharge    HFrEF  Had Echo in 2019 with severely decreased left ventricular systolic function with estimated ejection fraction at 20%.  Additional finding of severe global hypokinesis with inferior posterior lateral wall motion abnormalities  Echo scheduled for today    CODE STATUS:Full Code  Access: PIV  Antibiotics: Levaquin  Diet: Diet Soft & Bite Sized   DVT Prophylaxis: Lovenox ppx  GI Prophylaxis: Protonix  Fluids: None     Disposition: Cardiac work up pending for today. Lidocaine patch for muscle strain. Holter monitor needed.       Miguel Magdalneo MD     [facility] Resident HO-1  01/30/2023

## 2023-01-30 NOTE — PROGRESS NOTES
"Inpatient Nutrition Evaluation    Admit Date: 1/27/2023   Total duration of encounter: 3 days    Nutrition Recommendation/Prescription     Continue Soft & Bite size for ease of chewing  Boost (provides 240 kcal, 10 g protein per serving) TID  Continue vitamin therapy  Monitor weights weekly     Nutrition Assessment     Chart Review    Reason Seen: continuous nutrition monitoring and malnutrition screening tool (MST)    Malnutrition Screening Tool Results   Have you recently lost weight without trying?: Unsure  Have you been eating poorly because of a decreased appetite?: No   MST Score: 2     Diagnosis:  Seizure disorder, alcohol abuse/polysubstance abuse, COPD, Asthma, Afib, HFrEF     Relevant Medical History: Fractured skull, Afib, HF, Temporary trach for respiratory failure, seizure disorder, polysubstance abuse    Nutrition-Related Medications: Folic acid, Methylprednisolone, Thiamine, MVI    Nutrition-Related Labs:  1/30/23 -- H/H 12/37.9 L, Glu 124 H, K 4.3, BUN 16.8, Cr 0.84    Diet Order: Diet Soft & Bite Sized  Oral Supplement Order: none  Appetite/Oral Intake: good/% of meals  Factors Affecting Nutritional Intake:  missing teeth, denies difficulty chewing  Food/Advent/Cultural Preferences: none reported  Food Allergies: none reported       Wound(s):   skin intact    Comments    1/30/23 -- Pt reports good appetite, denies difficulty eating despite missing teeth; unsure of UBW, reports always being on the small side, does report decrease in pants size within the last year; most recent wt within the last month stable; will order Boost ONS to supplement meals; h/o ETOH -- continues current vitamin therapy    Anthropometrics    Height: 5' 9" (175.3 cm) Height Method: Stated  Last Weight: 66.7 kg (147 lb) (01/30/23 0755) Weight Method: Bed Scale  BMI (Calculated): 21.7  BMI Classification: normal (BMI 18.5-24.9)        Ideal Body Weight (IBW), Male: 160 lb     % Ideal Body Weight, Male (lb): 91.88 %   "                        Usual Weight Provided By: unable to obtain usual weight    Wt Readings from Last 3 Encounters:   01/30/23 0755 66.7 kg (147 lb)   01/28/23 2130 66.9 kg (147 lb 8 oz)   01/28/23 0006 69 kg (152 lb 1.9 oz)   01/05/23 1732 72.6 kg (160 lb)   12/30/22 2258 63.5 kg (139 lb 15.9 oz)   12/27/22   65.8 kg    Weight Change(s) Since Admission:  Admit Weight: 69 kg (152 lb 1.9 oz) (01/28/23 0006)  Pt unsure of UBW    Patient Education    Not applicable.    Monitoring & Evaluation     Dietitian will monitor energy intake, weight change, electrolyte/renal panel, and gastrointestinal profile.  Nutrition Risk/Follow-Up: low (follow-up in 5-7 days)  Patients assigned 'low nutrition risk' status do not qualify for a full nutritional assessment but will be monitored and re-evaluated in a 5-7 day time period. Please consult if re-evaluation needed sooner.

## 2023-01-31 LAB
ALBUMIN SERPL-MCNC: 3.4 G/DL (ref 3.4–4.8)
ALBUMIN/GLOB SERPL: 1.3 RATIO (ref 1.1–2)
ALP SERPL-CCNC: 55 UNIT/L (ref 40–150)
ALT SERPL-CCNC: 13 UNIT/L (ref 0–55)
AST SERPL-CCNC: 11 UNIT/L (ref 5–34)
BACTERIA SPEC CULT: NORMAL
BASOPHILS # BLD AUTO: 0.01 X10(3)/MCL (ref 0–0.2)
BASOPHILS NFR BLD AUTO: 0.1 %
BILIRUBIN DIRECT+TOT PNL SERPL-MCNC: 0.4 MG/DL
BUN SERPL-MCNC: 17.6 MG/DL (ref 8.4–25.7)
CALCIUM SERPL-MCNC: 9.5 MG/DL (ref 8.8–10)
CHLORIDE SERPL-SCNC: 101 MMOL/L (ref 98–107)
CO2 SERPL-SCNC: 30 MMOL/L (ref 23–31)
CREAT SERPL-MCNC: 0.83 MG/DL (ref 0.73–1.18)
EOSINOPHIL # BLD AUTO: 0 X10(3)/MCL (ref 0–0.9)
EOSINOPHIL NFR BLD AUTO: 0 %
ERYTHROCYTE [DISTWIDTH] IN BLOOD BY AUTOMATED COUNT: 13.7 % (ref 11.5–17)
GFR SERPLBLD CREATININE-BSD FMLA CKD-EPI: >60 MLS/MIN/1.73/M2
GLOBULIN SER-MCNC: 2.7 GM/DL (ref 2.4–3.5)
GLUCOSE SERPL-MCNC: 120 MG/DL (ref 82–115)
GRAM STN SPEC: NORMAL
HCT VFR BLD AUTO: 39 % (ref 42–52)
HGB BLD-MCNC: 12.3 GM/DL (ref 14–18)
IMM GRANULOCYTES # BLD AUTO: 0.07 X10(3)/MCL (ref 0–0.04)
IMM GRANULOCYTES NFR BLD AUTO: 0.6 %
LYMPHOCYTES # BLD AUTO: 0.87 X10(3)/MCL (ref 0.6–4.6)
LYMPHOCYTES NFR BLD AUTO: 7.1 %
MAGNESIUM SERPL-MCNC: 2.1 MG/DL (ref 1.6–2.6)
MCH RBC QN AUTO: 30.1 PG
MCHC RBC AUTO-ENTMCNC: 31.5 MG/DL (ref 33–36)
MCV RBC AUTO: 95.6 FL (ref 80–94)
MONOCYTES # BLD AUTO: 0.31 X10(3)/MCL (ref 0.1–1.3)
MONOCYTES NFR BLD AUTO: 2.5 %
NEUTROPHILS # BLD AUTO: 10.93 X10(3)/MCL (ref 2.1–9.2)
NEUTROPHILS NFR BLD AUTO: 89.7 %
NRBC BLD AUTO-RTO: 0 %
PATH REV: NORMAL
PHOSPHATE SERPL-MCNC: 4.5 MG/DL (ref 2.3–4.7)
PLATELET # BLD AUTO: 274 X10(3)/MCL (ref 130–400)
PMV BLD AUTO: 9.6 FL (ref 7.4–10.4)
POTASSIUM SERPL-SCNC: 4.2 MMOL/L (ref 3.5–5.1)
PROT SERPL-MCNC: 6.1 GM/DL (ref 5.8–7.6)
RBC # BLD AUTO: 4.08 X10(6)/MCL (ref 4.7–6.1)
SODIUM SERPL-SCNC: 139 MMOL/L (ref 136–145)
WBC # SPEC AUTO: 12.2 X10(3)/MCL (ref 4.5–11.5)

## 2023-01-31 PROCEDURE — 80053 COMPREHEN METABOLIC PANEL: CPT | Performed by: INTERNAL MEDICINE

## 2023-01-31 PROCEDURE — 83735 ASSAY OF MAGNESIUM: CPT | Performed by: INTERNAL MEDICINE

## 2023-01-31 PROCEDURE — 27000221 HC OXYGEN, UP TO 24 HOURS

## 2023-01-31 PROCEDURE — 25000003 PHARM REV CODE 250: Performed by: INTERNAL MEDICINE

## 2023-01-31 PROCEDURE — 96374 THER/PROPH/DIAG INJ IV PUSH: CPT | Mod: 59

## 2023-01-31 PROCEDURE — 63600175 PHARM REV CODE 636 W HCPCS: Performed by: INTERNAL MEDICINE

## 2023-01-31 PROCEDURE — 84100 ASSAY OF PHOSPHORUS: CPT | Performed by: INTERNAL MEDICINE

## 2023-01-31 PROCEDURE — 85025 COMPLETE CBC W/AUTO DIFF WBC: CPT | Performed by: INTERNAL MEDICINE

## 2023-01-31 PROCEDURE — 25000242 PHARM REV CODE 250 ALT 637 W/ HCPCS: Performed by: INTERNAL MEDICINE

## 2023-01-31 PROCEDURE — 96372 THER/PROPH/DIAG INJ SC/IM: CPT | Mod: 59 | Performed by: INTERNAL MEDICINE

## 2023-01-31 PROCEDURE — 94640 AIRWAY INHALATION TREATMENT: CPT

## 2023-01-31 PROCEDURE — G0378 HOSPITAL OBSERVATION PER HR: HCPCS

## 2023-01-31 PROCEDURE — 96365 THER/PROPH/DIAG IV INF INIT: CPT

## 2023-01-31 PROCEDURE — 96376 TX/PRO/DX INJ SAME DRUG ADON: CPT

## 2023-01-31 PROCEDURE — 94761 N-INVAS EAR/PLS OXIMETRY MLT: CPT

## 2023-01-31 RX ORDER — LEVOFLOXACIN 750 MG/1
750 TABLET ORAL DAILY
Status: DISCONTINUED | OUTPATIENT
Start: 2023-02-01 | End: 2023-02-02 | Stop reason: HOSPADM

## 2023-01-31 RX ADMIN — BUDESONIDE 0.5 MG: 0.5 SUSPENSION RESPIRATORY (INHALATION) at 07:01

## 2023-01-31 RX ADMIN — THIAMINE HCL TAB 100 MG 100 MG: 100 TAB at 09:01

## 2023-01-31 RX ADMIN — MELATONIN TAB 3 MG 6 MG: 3 TAB at 09:01

## 2023-01-31 RX ADMIN — LIDOCAINE 2 PATCH: 50 PATCH TOPICAL at 07:01

## 2023-01-31 RX ADMIN — MULTIPLE VITAMINS W/ MINERALS TAB 1 TABLET: TAB at 09:01

## 2023-01-31 RX ADMIN — IPRATROPIUM BROMIDE AND ALBUTEROL SULFATE 3 ML: 2.5; .5 SOLUTION RESPIRATORY (INHALATION) at 01:01

## 2023-01-31 RX ADMIN — LEVOFLOXACIN 750 MG: 750 INJECTION, SOLUTION INTRAVENOUS at 12:01

## 2023-01-31 RX ADMIN — IPRATROPIUM BROMIDE AND ALBUTEROL SULFATE 3 ML: 2.5; .5 SOLUTION RESPIRATORY (INHALATION) at 07:01

## 2023-01-31 RX ADMIN — METHYLPREDNISOLONE SODIUM SUCCINATE 60 MG: 125 INJECTION, POWDER, FOR SOLUTION INTRAMUSCULAR; INTRAVENOUS at 09:01

## 2023-01-31 RX ADMIN — LEVETIRACETAM 500 MG: 500 TABLET, FILM COATED ORAL at 09:01

## 2023-01-31 RX ADMIN — ENOXAPARIN SODIUM 40 MG: 40 INJECTION SUBCUTANEOUS at 04:01

## 2023-01-31 RX ADMIN — FOLIC ACID 1 MG: 1 TABLET ORAL at 09:01

## 2023-01-31 RX ADMIN — CETIRIZINE HYDROCHLORIDE 10 MG: 10 TABLET ORAL at 09:01

## 2023-01-31 NOTE — PLAN OF CARE
Problem: Adult Inpatient Plan of Care  Goal: Plan of Care Review  Outcome: Ongoing, Progressing  Goal: Patient-Specific Goal (Individualized)  Outcome: Ongoing, Progressing  Goal: Absence of Hospital-Acquired Illness or Injury  Outcome: Ongoing, Progressing  Goal: Optimal Comfort and Wellbeing  Outcome: Ongoing, Progressing  Goal: Readiness for Transition of Care  Outcome: Ongoing, Progressing     Problem: Fall Injury Risk  Goal: Absence of Fall and Fall-Related Injury  Outcome: Ongoing, Progressing     Problem: Violence Risk or Actual  Goal: Anger and Impulse Control  Outcome: Ongoing, Progressing     Problem: Seizure, Active Management  Goal: Absence of Seizure/Seizure-Related Injury  Outcome: Ongoing, Progressing     Problem: Alcohol Withdrawal  Goal: Alcohol Withdrawal Symptom Control  Outcome: Ongoing, Progressing     Problem: Acute Neurologic Deterioration (Alcohol Withdrawal)  Goal: Optimal Neurologic Function  Outcome: Ongoing, Progressing     Problem: Substance Misuse (Alcohol Withdrawal)  Goal: Readiness for Change Identified  Outcome: Ongoing, Progressing

## 2023-01-31 NOTE — PROGRESS NOTES
"[facility] Progress Note     Resident Team: [facility] Medicine List       Subjective:      Brief HPI:  This is a 62-year-old  male with past medical history a fractured skull (1989), atrial fibrillation, HFrEF (EF 25% 2019), temporary trach for resp failure, seizure disorder, and polysubstance abuse who presents to the emergency department via EMS for presumed seizure at primary residence which is [facility].  Patient does not recall having seizure but does admit to multiple hospitalizations in the past with most recent hospitalization January 5, 2023. Additioanlly, he states he was diagnosed with a seizure disorder approximately a year ago. However, through review of chart appears that patient has had several hospitalizations dating back to 2019. He states that he is compliant with his medications but then tells me that he has been out of his dilatin for 2 months now. However, per his chart he is on Keppra.  At time of evaluation patient complaining of minor headache.  Also endorses having shortness of breath and cough per the of green sputum which apparently has been ongoing for past 2 months.  Otherwise denies lightheadedness, dizziness, myalgia, arthralgia, chest pain.  He is a current tobacco user. Began smoking at the age of 22 (2-3 packs). Recently cut down to half a pack/day.   He does admit to alcohol use. States he was not a drinker rather a "swimmer" indicating massive consumption up to a liter/day. Most recent drink was 3 days ago which was half a pint.  States he did every drug under the sun and is apparently has been sober for 3 months. Also endorsed remote IV drug use.     Interval History:   No acute events overnight. VSS. Afebrile. No seizures overnight. Leukocytosis this morning improved from yesterday on Solu-medrol. H/H stable. Electrolytes within normal limits. Awake, alert, and oriented. No acute complaints at this time.     Review of Systems:  Review of " Systems   Constitutional:  Negative for chills and fever.   Eyes:  Negative for blurred vision and double vision.   Respiratory:  Positive for cough. Negative for shortness of breath and wheezing.    Cardiovascular:  Negative for chest pain and palpitations.   Gastrointestinal:  Negative for abdominal pain, nausea and vomiting.   Genitourinary:  Negative for dysuria.   Musculoskeletal:  Negative for joint pain and myalgias.   Neurological:  Negative for dizziness, tingling, sensory change, weakness and headaches.      Objective:     Last 24 Hour Vital Signs:  BP  Min: 76/44  Max: 113/66  Temp  Av.3 °F (36.8 °C)  Min: 98 °F (36.7 °C)  Max: 98.4 °F (36.9 °C)  Pulse  Av.6  Min: 69  Max: 91  Resp  Av.3  Min: 18  Max: 19  SpO2  Av.5 %  Min: 90 %  Max: 96 %  I/O last 3 completed shifts:  In: 1390 [P.O.:1240; IV Piggyback:150]  Out: 3200 [Urine:3200]    Physical Examination:  Physical Exam  Vitals and nursing note reviewed.   Constitutional:       General: He is not in acute distress.     Appearance: He is normal weight. He is not ill-appearing or toxic-appearing.      Interventions: Nasal cannula in place.   HENT:      Head: Normocephalic and atraumatic.      Mouth/Throat:      Mouth: Mucous membranes are moist.      Pharynx: Oropharynx is clear.   Eyes:      General: No scleral icterus.     Extraocular Movements: Extraocular movements intact.      Conjunctiva/sclera: Conjunctivae normal.      Pupils: Pupils are equal, round, and reactive to light.   Neck:      Comments: Left clavicular mass mildly tender to palpation  Cardiovascular:      Rate and Rhythm: Normal rate and regular rhythm.      Pulses: Normal pulses.      Heart sounds: Normal heart sounds. No murmur heard.    No gallop.   Pulmonary:      Effort: Pulmonary effort is normal. No respiratory distress.      Breath sounds: No stridor. No wheezing. No rhonchi or rales.   Abdominal:      Palpations: Abdomen is soft.   Musculoskeletal:          General: No swelling.      Cervical back: Normal range of motion.      Right lower leg: No edema.      Left lower leg: No edema.   Skin:     General: Skin is warm and dry.   Neurological:      Mental Status: He is alert and oriented to person, place, and time.     Laboratory:  Most Recent Data:  CBC:   Lab Results   Component Value Date    WBC 12.2 (H) 01/31/2023    HGB 12.3 (L) 01/31/2023    HCT 39.0 (L) 01/31/2023     01/31/2023    MCV 95.6 (H) 01/31/2023    RDW 13.7 01/31/2023     WBC Differential:   Recent Labs   Lab 01/27/23  2014 01/28/23  0505 01/29/23  0301 01/30/23  0513 01/31/23  0523   WBC 12.2* 9.0 15.0* 13.1* 12.2*   HGB 12.9* 11.4* 11.4* 12.0* 12.3*   HCT 40.6* 37.1* 35.7* 37.9* 39.0*    263 263 261 274   MCV 97.1* 98.7* 97.0* 96.7* 95.6*       BMP:   Lab Results   Component Value Date     01/31/2023    K 4.2 01/31/2023     12/29/2022    CO2 30 01/31/2023    BUN 17.6 01/31/2023    CREATININE 0.83 01/31/2023    GLU 82 12/29/2022    CALCIUM 9.5 01/31/2023    MG 2.10 01/31/2023    PHOS 4.5 01/31/2023     LFTs:   Lab Results   Component Value Date    PROT 6.4 12/29/2022    ALBUMIN 3.4 01/31/2023    BILITOT 0.4 01/31/2023    AST 11 01/31/2023    ALKPHOS 55 01/31/2023    ALT 13 01/31/2023     Coags:   Lab Results   Component Value Date    INR 1.0 12/29/2022   DM:   Lab Results   Component Value Date    HGBA1C 4.9 01/28/2023    CREATININE 0.83 01/31/2023     Thyroid:   Lab Results   Component Value Date    TSH 2.440 01/27/2023     Cardiac:   Lab Results   Component Value Date    TROPONINI <0.010 01/27/2023    BNP 63.9 01/28/2023       Microbiology Data:  Microbiology Results (last 7 days)       Procedure Component Value Units Date/Time    Respiratory Culture [[id number]] Collected: 01/29/23 1215    Order Status: Completed Specimen: Sputum, Expectorated Updated: 01/31/23 0817     Respiratory Culture Normal respiratory negin     GRAM STAIN Quality 1+      Moderate Gram positive cocci       Few Gram Positive Rods      Few Gram Negative Rods    Blood culture x two cultures. Draw prior to antibiotics. [[id number]]  (Normal) Collected: 01/27/23 2311    Order Status: Completed Specimen: Blood from Hand, Right Updated: 01/31/23 0800     CULTURE, BLOOD ([facility]) No Growth At 72 Hours    Blood culture x two cultures. Draw prior to antibiotics. [[id number]]  (Normal) Collected: 01/27/23 2320    Order Status: Completed Specimen: Blood from Hand, Left Updated: 01/31/23 0800     CULTURE, BLOOD ([facility]) No Growth At 72 Hours             Radiology:  Imaging Results              X-Ray Chest PA And Lateral (Final result)  Result time 01/27/23 22:30:03      Final result by Tio Deras MD (01/27/23 22:30:03)                   Impression:      No acute findings in the chest      Electronically signed by: Tio Deras MD  Date:    01/27/2023  Time:    22:30               Narrative:    EXAMINATION:  XR CHEST PA AND LATERAL    CLINICAL HISTORY:  Wheezing    COMPARISON:  01/05/2023    FINDINGS:  PA and lateral views of the chest show pulmonary hyperinflation without focal consolidation, pneumothorax or pleural effusion.  Cardiac silhouette and pulmonary vasculature are normal.  No acute osseous findings.                        ED Interpretation by Leo Mya DO (01/27/23 22:22:34, [facility] Dept, Emergency Medicine)    Emphysematous changes, no dense lobar consolidation.                                    Current Medications:     Infusions:       Scheduled:   albuterol-ipratropium  3 mL Nebulization Q6H WAKE    budesonide  0.5 mg Nebulization Q12H    cetirizine  10 mg Oral Daily    enoxaparin  40 mg Subcutaneous Daily    folic acid  1 mg Oral Daily    levETIRAcetam  500 mg Oral BID    levoFLOXacin  750 mg Intravenous Q24H    LIDOcaine  2 patch Transdermal Q24H    methylPREDNISolone sodium succinate injection  60 mg Intravenous Q12H    multivitamin  1 tablet Oral Daily    thiamine  100 mg Oral Daily         PRN:  hydrALAZINE, lorazepam, melatonin, sodium chloride 0.9%    Antibiotics and Day Number of Therapy:  Levofloxacin 4    Assessment & Plan:     Seizure disorder  Fall/aspiration/seizure precautions in place. Neuro check q4.  Keppra 500 BID    Alcohol abuse/Polysubstance abuse  CIWA assessment. Lorazepam 2mg for CIWA >8  Multivitamin, thiamine, and folic acid daily    COPD/Asthma  Solumederol 60mg q12h with duoneb q6hrs while awake and budesonide 0.5 mg  BID  currently sat at 94% on room air  Currently receiving Levaquin; Qtc 472 from 506; will cont.will Levaquin    A. fib  History of A. Fib. Previously on Digoxin and Eliquis but currently not taking  EKG NSR. Holter at discharge    HFrEF  Had Echo in 2019 with severely decreased left ventricular systolic function with estimated ejection fraction at 20%.  Additional finding of severe global hypokinesis with inferior posterior lateral wall motion abnormalities  Echo today revealed EF 35%; will plan for inpatient lexiscan for tomorrow due to patient's living conditions; will be placed NPO at midnight for lexiscan tomorrow  Recommend metoprolol succinate 12.5 BID instead of acei outpatient for increase in preload     CODE STATUS:Full Code  Access: PIV  Antibiotics: Levaquin  Diet: Diet Soft & Bite Sized; NPO at midnight  DVT Prophylaxis: Lovenox ppx  GI Prophylaxis: Protonix  Fluids: None     Disposition: Lexiscan for tomorrow. Lidocaine patch for muscle strain. Holter monitor needed.       Miguel Magdaleno MD     [facility] Family Medicine Resident HO-1  01/31/2023

## 2023-01-31 NOTE — PLAN OF CARE
Problem: Adult Inpatient Plan of Care  Goal: Plan of Care Review  Outcome: Ongoing, Progressing  Goal: Patient-Specific Goal (Individualized)  Outcome: Ongoing, Progressing  Goal: Absence of Hospital-Acquired Illness or Injury  Outcome: Ongoing, Progressing  Goal: Optimal Comfort and Wellbeing  Outcome: Ongoing, Progressing  Goal: Readiness for Transition of Care  Outcome: Ongoing, Progressing     Problem: Fall Injury Risk  Goal: Absence of Fall and Fall-Related Injury  Outcome: Ongoing, Progressing     Problem: Seizure, Active Management  Goal: Absence of Seizure/Seizure-Related Injury  Outcome: Ongoing, Progressing     Problem: Alcohol Withdrawal  Goal: Alcohol Withdrawal Symptom Control  Outcome: Ongoing, Progressing     Problem: Acute Neurologic Deterioration (Alcohol Withdrawal)  Goal: Optimal Neurologic Function  Outcome: Ongoing, Progressing     Problem: Substance Misuse (Alcohol Withdrawal)  Goal: Readiness for Change Identified  Outcome: Ongoing, Progressing

## 2023-01-31 NOTE — CONSULTS
[facility]  Cardiology Inpatient Consultation    1/31/2023  5:39 PM    Attending Cardiologist: Dr. Robertson  Cardiology Fellow: Josep Mathis MD  Chief Compliant/Reason for Consult: HFrEF    HPI:   Pierre Pierson is a 62 y.o.year old male w/ PMH HFrEF, mild aortic stenosis, seizure disorder, ETOH use, polysubstance use, IVC filter here after a seizure. Patient had seizure activity at his homeless shelter and was sent here. He has been to the ED multiple times in the last year with the same presentation. He was diagnosed with low EF years ago and has not been on GDMT due to persistently low BP. He runs out of medications at his home and has not been on any cardiac medications. He has not had ischemic workup. He currently has no chest pain, SOB, palpitations, leg swelling, PND. He is able to walk over a mile without symptoms at baseline. He states that he does meth every now and then. He used to do meth a lot more but is unable to find it. Smokes marijuana as well. States his homeless shelter provides all his medications so he would not miss them. He also could possibly get transportation to clinic appointments if scheduled and if his shelter knows.     Past Medical History:  Past Medical History:   Diagnosis Date    Asthma     COPD (chronic obstructive pulmonary disease)     Seizures        Past Surgical History:  History reviewed. No pertinent surgical history.    Family History:  History reviewed. No pertinent family history.    Social History:  Social History     Socioeconomic History    Marital status: Single   Tobacco Use    Smoking status: Every Day     Packs/day: 0.50     Types: Cigarettes    Smokeless tobacco: Never   Substance and Sexual Activity    Alcohol use: Yes     Comment: daily (pint of vodka per day)    Drug use: Yes     Types: Marijuana     Comment: daily    Sexual activity: Not Currently     Social Determinants of Health     Financial Resource Strain: Medium Risk    Difficulty of Paying Living  Expenses: Somewhat hard   Food Insecurity: Food Insecurity Present    Worried About Running Out of Food in the Last Year: Sometimes true    Ran Out of Food in the Last Year: Sometimes true   Transportation Needs: Unmet Transportation Needs    Lack of Transportation (Medical): Yes    Lack of Transportation (Non-Medical): Yes   Physical Activity: Inactive    Days of Exercise per Week: 0 days    Minutes of Exercise per Session: 0 min   Stress: No Stress Concern Present    Feeling of Stress : Not at all   Social Connections: Socially Isolated    Frequency of Communication with Friends and Family: Once a week    Frequency of Social Gatherings with Friends and Family: Once a week    Attends Druze Services: Never    Active Member of Clubs or Organizations: No    Attends Club or Organization Meetings: Never    Marital Status: Never    Housing Stability: High Risk    Unable to Pay for Housing in the Last Year: Yes    Number of Places Lived in the Last Year: 2    Unstable Housing in the Last Year: Yes       Allergies:   Review of patient's allergies indicates:   Allergen Reactions    Keflex [cephalexin]        Hospital Medications:  Prior to Admission medications    Medication Sig Start Date End Date Taking? Authorizing Provider   albuterol (PROVENTIL/VENTOLIN HFA) 90 mcg/actuation inhaler Inhale 1-2 puffs into the lungs every 6 (six) hours as needed for Wheezing or Shortness of Breath. Rescue 1/5/23 1/5/24  Delbert Fitzgerald MD   aspirin (ECOTRIN) 81 MG EC tablet Take 1 tablet (81 mg total) by mouth once daily. 10/15/19 10/14/20  Warner Ibarra MD   cetirizine (ZYRTEC) 10 MG tablet Take 1 tablet (10 mg total) by mouth once daily. 1/5/23 1/5/24  Delbert Fitzgerald MD   folic acid (FOLVITE) 1 MG tablet Take 1 tablet (1 mg total) by mouth once daily. 1/11/21 2/11/21  Maykel Rosales MD   ibuprofen (ADVIL,MOTRIN) 600 MG tablet Take 1 tablet (600 mg total) by mouth every 6 (six) hours as needed for  "Pain. 1/5/23   Delbert Fitzgerald MD   levETIRAcetam (KEPPRA) 1000 MG tablet Take 1 tablet (1,000 mg total) by mouth 2 (two) times daily. 1/27/23 2/26/23  YAMINI Smith   lisinopril (PRINIVIL,ZESTRIL) 2.5 MG tablet Take 1 tablet (2.5 mg total) by mouth once daily. 10/15/19 10/14/20  Warner Ibarra MD   multivitamin (THERAGRAN) per tablet Take 1 tablet by mouth once daily. 7/7/19   Kristyn Mckeon MD       Review of Systems:  Up to 10 point review of systems is negative unless noted in HPI or overall note    PHYSICAL EXAM:  BP (!) 85/55   Pulse 81   Temp 99 °F (37.2 °C) (Oral)   Resp 19   Ht 5' 9" (1.753 m)   Wt 66.7 kg (147 lb)   SpO2 (!) 92%   BMI 21.71 kg/m²   General: Alert and oriented. No acute distress. Speaking in full sentences.   HEENT: Normocephalic. Atraumatic  Neck: Supple. No JVD  Heart: RRR. S1 and S2 heard. No murmurs. Pulses palpable in radials + DP/PT. No pitting edema BLE.  Lungs: CTABL. Nonlabored respirations. No supplemental O2  GI: No tenderness or distention  : No barboza  Skin: No venous stasis changes in BLE.   MSK: No deformities  Neuro: Alert    CARDIOVASCULAR STUDIES:  Pertinent cardiovascular studies including labs, imaging, cath reports, echo reports, CT, cMRI, etc were independently reviewed in this patients care and reviewed below as needed.     ASSESSMENT:    Pierre Pierson is a 62 y.o.year old male w/ PMH HFrEF, mild aortic stenosis, seizure disorder, ETOH use, polysubstance use, IVC filter here after a seizure. Patient ran out of medications. TTE with EF 35% and mild mR. We are consulted for low EF and medication/workup recommendations. Etiology of his low EF remains unknown.     PLAN/RECOMMENDATIONS:    - Given persistently low blood pressures, patient likely will not tolerate a full panel of GDMT. For now, recommend starting metoprolol succinate 12.5mg daily. Can uptitrate and add medications outpatient.  - Can obtain ischemic workup in the future. Will " need to followup in clinic and abstain from drug use prior to any invasive workup that results in intervention. If stenting is required, patient will need to establish ability to obtain DAPT consistently as in-stent thrombosis can result in major MI and death.   - Goal BP <130/80 and goal LDL <70  - Followup with [facility] cardiology in 4-6 weeks after discharge    Josep Mathis MD  [facility] Cardiology Fellow

## 2023-02-01 LAB
ALBUMIN SERPL-MCNC: 3.4 G/DL (ref 3.4–4.8)
ALBUMIN/GLOB SERPL: 1.4 RATIO (ref 1.1–2)
ALP SERPL-CCNC: 60 UNIT/L (ref 40–150)
ALT SERPL-CCNC: 13 UNIT/L (ref 0–55)
AST SERPL-CCNC: 10 UNIT/L (ref 5–34)
BASOPHILS # BLD AUTO: 0.01 X10(3)/MCL (ref 0–0.2)
BASOPHILS NFR BLD AUTO: 0.1 %
BILIRUBIN DIRECT+TOT PNL SERPL-MCNC: 0.4 MG/DL
BUN SERPL-MCNC: 20.5 MG/DL (ref 8.4–25.7)
CALCIUM SERPL-MCNC: 9.3 MG/DL (ref 8.8–10)
CHLORIDE SERPL-SCNC: 102 MMOL/L (ref 98–107)
CO2 SERPL-SCNC: 29 MMOL/L (ref 23–31)
CREAT SERPL-MCNC: 0.83 MG/DL (ref 0.73–1.18)
CV STRESS BASE HR: 75 BPM
DIASTOLIC BLOOD PRESSURE: 100 MMHG
EOSINOPHIL # BLD AUTO: 0 X10(3)/MCL (ref 0–0.9)
EOSINOPHIL NFR BLD AUTO: 0 %
ERYTHROCYTE [DISTWIDTH] IN BLOOD BY AUTOMATED COUNT: 13.7 % (ref 11.5–17)
GFR SERPLBLD CREATININE-BSD FMLA CKD-EPI: >60 MLS/MIN/1.73/M2
GLOBULIN SER-MCNC: 2.5 GM/DL (ref 2.4–3.5)
GLUCOSE SERPL-MCNC: 125 MG/DL (ref 82–115)
HCT VFR BLD AUTO: 39.5 % (ref 42–52)
HGB BLD-MCNC: 12.4 GM/DL (ref 14–18)
IMM GRANULOCYTES # BLD AUTO: 0.08 X10(3)/MCL (ref 0–0.04)
IMM GRANULOCYTES NFR BLD AUTO: 0.7 %
LYMPHOCYTES # BLD AUTO: 0.93 X10(3)/MCL (ref 0.6–4.6)
LYMPHOCYTES NFR BLD AUTO: 7.7 %
MAGNESIUM SERPL-MCNC: 2.1 MG/DL (ref 1.6–2.6)
MCH RBC QN AUTO: 30.3 PG
MCHC RBC AUTO-ENTMCNC: 31.4 MG/DL (ref 33–36)
MCV RBC AUTO: 96.6 FL (ref 80–94)
MONOCYTES # BLD AUTO: 0.27 X10(3)/MCL (ref 0.1–1.3)
MONOCYTES NFR BLD AUTO: 2.2 %
NEUTROPHILS # BLD AUTO: 10.84 X10(3)/MCL (ref 2.1–9.2)
NEUTROPHILS NFR BLD AUTO: 89.3 %
NRBC BLD AUTO-RTO: 0 %
NUC REST DIASTOLIC VOLUME INDEX: 175
NUC REST EJECTION FRACTION: 50
NUC REST SYSTOLIC VOLUME INDEX: 87
NUC STRESS DIASTOLIC VOLUME INDEX: 160
NUC STRESS EJECTION FRACTION: 41 %
NUC STRESS SYSTOLIC VOLUME INDEX: 95
OHS CV CPX 85 PERCENT MAX PREDICTED HEART RATE MALE: 136
OHS CV CPX ESTIMATED METS: 2
OHS CV CPX MAX PREDICTED HEART RATE: 160
OHS CV CPX PATIENT IS FEMALE: 0
OHS CV CPX PATIENT IS MALE: 1
OHS CV CPX PEAK DIASTOLIC BLOOD PRESSURE: 89 MMHG
OHS CV CPX PEAK HEAR RATE: 113 BPM
OHS CV CPX PEAK RATE PRESSURE PRODUCT: NORMAL
OHS CV CPX PEAK SYSTOLIC BLOOD PRESSURE: 211 MMHG
OHS CV CPX PERCENT MAX PREDICTED HEART RATE ACHIEVED: 71
OHS CV CPX RATE PRESSURE PRODUCT PRESENTING: NORMAL
PHOSPHATE SERPL-MCNC: 4.4 MG/DL (ref 2.3–4.7)
PLATELET # BLD AUTO: 272 X10(3)/MCL (ref 130–400)
PMV BLD AUTO: 9.6 FL (ref 7.4–10.4)
POTASSIUM SERPL-SCNC: 4.8 MMOL/L (ref 3.5–5.1)
PROT SERPL-MCNC: 5.9 GM/DL (ref 5.8–7.6)
RBC # BLD AUTO: 4.09 X10(6)/MCL (ref 4.7–6.1)
SODIUM SERPL-SCNC: 140 MMOL/L (ref 136–145)
STRESS ECHO POST EXERCISE DUR MIN: 2 MINUTES
STRESS ECHO POST EXERCISE DUR SEC: 27 SECONDS
SYSTOLIC BLOOD PRESSURE: 140 MMHG
WBC # SPEC AUTO: 12.1 X10(3)/MCL (ref 4.5–11.5)

## 2023-02-01 PROCEDURE — 63600175 PHARM REV CODE 636 W HCPCS: Performed by: INTERNAL MEDICINE

## 2023-02-01 PROCEDURE — 25000003 PHARM REV CODE 250: Performed by: INTERNAL MEDICINE

## 2023-02-01 PROCEDURE — 94761 N-INVAS EAR/PLS OXIMETRY MLT: CPT

## 2023-02-01 PROCEDURE — 85025 COMPLETE CBC W/AUTO DIFF WBC: CPT | Performed by: INTERNAL MEDICINE

## 2023-02-01 PROCEDURE — 96372 THER/PROPH/DIAG INJ SC/IM: CPT | Mod: 59 | Performed by: INTERNAL MEDICINE

## 2023-02-01 PROCEDURE — 25000242 PHARM REV CODE 250 ALT 637 W/ HCPCS: Performed by: INTERNAL MEDICINE

## 2023-02-01 PROCEDURE — 84100 ASSAY OF PHOSPHORUS: CPT | Performed by: INTERNAL MEDICINE

## 2023-02-01 PROCEDURE — 83735 ASSAY OF MAGNESIUM: CPT | Performed by: INTERNAL MEDICINE

## 2023-02-01 PROCEDURE — G0378 HOSPITAL OBSERVATION PER HR: HCPCS

## 2023-02-01 PROCEDURE — 94640 AIRWAY INHALATION TREATMENT: CPT | Mod: XB

## 2023-02-01 PROCEDURE — 80053 COMPREHEN METABOLIC PANEL: CPT | Performed by: INTERNAL MEDICINE

## 2023-02-01 RX ORDER — LEVETIRACETAM 500 MG/1
500 TABLET ORAL 2 TIMES DAILY
Qty: 180 TABLET | Refills: 0 | Status: SHIPPED | OUTPATIENT
Start: 2023-02-01 | End: 2023-02-27 | Stop reason: SDUPTHER

## 2023-02-01 RX ORDER — LEVOFLOXACIN 750 MG/1
750 TABLET ORAL DAILY
Qty: 5 TABLET | Refills: 0 | Status: SHIPPED | OUTPATIENT
Start: 2023-02-02 | End: 2023-02-07

## 2023-02-01 RX ORDER — KETOROLAC TROMETHAMINE 30 MG/ML
15 INJECTION, SOLUTION INTRAMUSCULAR; INTRAVENOUS ONCE
Status: DISCONTINUED | OUTPATIENT
Start: 2023-02-01 | End: 2023-02-01

## 2023-02-01 RX ORDER — REGADENOSON 0.08 MG/ML
0.4 INJECTION, SOLUTION INTRAVENOUS ONCE
Status: COMPLETED | OUTPATIENT
Start: 2023-02-01 | End: 2023-02-01

## 2023-02-01 RX ORDER — METOPROLOL SUCCINATE 25 MG/1
12.5 TABLET, EXTENDED RELEASE ORAL DAILY
Qty: 45 TABLET | Refills: 0 | Status: SHIPPED | OUTPATIENT
Start: 2023-02-01 | End: 2023-05-02

## 2023-02-01 RX ADMIN — BUDESONIDE 0.5 MG: 0.5 SUSPENSION RESPIRATORY (INHALATION) at 07:02

## 2023-02-01 RX ADMIN — CETIRIZINE HYDROCHLORIDE 10 MG: 10 TABLET ORAL at 08:02

## 2023-02-01 RX ADMIN — MULTIPLE VITAMINS W/ MINERALS TAB 1 TABLET: TAB at 08:02

## 2023-02-01 RX ADMIN — THIAMINE HCL TAB 100 MG 100 MG: 100 TAB at 08:02

## 2023-02-01 RX ADMIN — LEVETIRACETAM 500 MG: 500 TABLET, FILM COATED ORAL at 08:02

## 2023-02-01 RX ADMIN — LEVETIRACETAM 500 MG: 500 TABLET, FILM COATED ORAL at 09:02

## 2023-02-01 RX ADMIN — FOLIC ACID 1 MG: 1 TABLET ORAL at 08:02

## 2023-02-01 RX ADMIN — REGADENOSON 0.4 MG: 0.08 INJECTION, SOLUTION INTRAVENOUS at 10:02

## 2023-02-01 RX ADMIN — LEVOFLOXACIN 750 MG: 750 TABLET, FILM COATED ORAL at 11:02

## 2023-02-01 RX ADMIN — LIDOCAINE 2 PATCH: 50 PATCH TOPICAL at 07:02

## 2023-02-01 RX ADMIN — BUDESONIDE 0.5 MG: 0.5 SUSPENSION RESPIRATORY (INHALATION) at 09:02

## 2023-02-01 RX ADMIN — ENOXAPARIN SODIUM 40 MG: 40 INJECTION SUBCUTANEOUS at 05:02

## 2023-02-01 RX ADMIN — IPRATROPIUM BROMIDE AND ALBUTEROL SULFATE 3 ML: 2.5; .5 SOLUTION RESPIRATORY (INHALATION) at 09:02

## 2023-02-01 RX ADMIN — IPRATROPIUM BROMIDE AND ALBUTEROL SULFATE 3 ML: 2.5; .5 SOLUTION RESPIRATORY (INHALATION) at 07:02

## 2023-02-01 RX ADMIN — MELATONIN TAB 3 MG 6 MG: 3 TAB at 10:02

## 2023-02-01 RX ADMIN — IPRATROPIUM BROMIDE AND ALBUTEROL SULFATE 3 ML: 2.5; .5 SOLUTION RESPIRATORY (INHALATION) at 01:02

## 2023-02-01 NOTE — PROGRESS NOTES
"[facility] Progress Note     Resident Team: [facility] Medicine List     Subjective:      Brief HPI:  This is a 62-year-old  male with past medical history a fractured skull (1989), atrial fibrillation, HFrEF (EF 25% 2019), temporary trach for resp failure, seizure disorder, and polysubstance abuse who presents to the emergency department via EMS for presumed seizure at primary residence which is [facility].  Patient does not recall having seizure but does admit to multiple hospitalizations in the past with most recent hospitalization January 5, 2023. Additioanlly, he states he was diagnosed with a seizure disorder approximately a year ago. However, through review of chart appears that patient has had several hospitalizations dating back to 2019. He states that he is compliant with his medications but then tells me that he has been out of his dilatin for 2 months now. However, per his chart he is on Keppra.  At time of evaluation patient complaining of minor headache.  Also endorses having shortness of breath and cough per the of green sputum which apparently has been ongoing for past 2 months.  Otherwise denies lightheadedness, dizziness, myalgia, arthralgia, chest pain.  He is a current tobacco user. Began smoking at the age of 22 (2-3 packs). Recently cut down to half a pack/day.   He does admit to alcohol use. States he was not a drinker rather a "swimmer" indicating massive consumption up to a liter/day. Most recent drink was 3 days ago which was half a pint.  States he did every drug under the sun and is apparently has been sober for 3 months. Also endorsed remote IV drug use.      Interval History:   No acute events overnight. VSS. Afebrile. No seizures overnight. Leukocytosis this morning improved from yesterday on Solu-medrol. H/H stable. Electrolytes within normal limits. Awake, alert, and oriented. No acute complaints at this time.     Review of " "Systems:  ROS  Constitutional:  Negative for chills and fever.   Eyes:  Negative for blurred vision and double vision.   Respiratory:  Positive for cough. Negative for shortness of breath and wheezing.    Cardiovascular:  Negative for chest pain and palpitations.   Gastrointestinal:  Negative for abdominal pain, nausea and vomiting.   Genitourinary:  Negative for dysuria.   Musculoskeletal:  Negative for joint pain and myalgias.   Neurological:  Negative for dizziness, tingling, sensory change, weakness and headaches.   Objective:     Last 24 Hour Vital Signs:  BP  Min: 82/51  Max: 140/100  Temp  Av.7 °F (36.5 °C)  Min: 97.4 °F (36.3 °C)  Max: 98.2 °F (36.8 °C)  Pulse  Av.5  Min: 66  Max: 114  Resp  Av.1  Min: 18  Max: 20  SpO2  Av.3 %  Min: 88 %  Max: 96 %  Height  Av' 9" (175.3 cm)  Min: 5' 9" (175.3 cm)  Max: 5' 9" (175.3 cm)  Weight  Av.7 kg (147 lb 0.8 oz)  Min: 66.7 kg (147 lb 0.8 oz)  Max: 66.7 kg (147 lb 0.8 oz)  I/O last 3 completed shifts:  In: 1632 [P.O.:1482; IV Piggyback:150]  Out: 3250 [Urine:3250]    Physical Examination:  Physical Exam  Vitals and nursing note reviewed.   Constitutional:       General: He is not in acute distress.     Appearance: He is normal weight. He is not ill-appearing or toxic-appearing.      Interventions: Nasal cannula in place.   HENT:      Head: Normocephalic and atraumatic.      Mouth/Throat:      Mouth: Mucous membranes are moist.      Pharynx: Oropharynx is clear.   Eyes:      General: No scleral icterus.     Extraocular Movements: Extraocular movements intact.      Conjunctiva/sclera: Conjunctivae normal.      Pupils: Pupils are equal, round, and reactive to light.   Neck:      Comments: Left clavicular mass mildly tender to palpation  Cardiovascular:      Rate and Rhythm: Normal rate and regular rhythm.      Pulses: Normal pulses.      Heart sounds: Normal heart sounds. No murmur heard.    No gallop.   Pulmonary:      Effort: Pulmonary " effort is normal. No respiratory distress.      Breath sounds: No stridor. No wheezing. No rhonchi or rales.   Abdominal:      Palpations: Abdomen is soft.   Musculoskeletal:         General: No swelling.      Cervical back: Normal range of motion.      Right lower leg: No edema.      Left lower leg: No edema.   Skin:     General: Skin is warm and dry.   Neurological:      Mental Status: He is alert and oriented to person, place, and time.     Laboratory:  Most Recent Data:  CBC:   Lab Results   Component Value Date    WBC 12.1 (H) 02/01/2023    HGB 12.4 (L) 02/01/2023    HCT 39.5 (L) 02/01/2023     02/01/2023    MCV 96.6 (H) 02/01/2023    RDW 13.7 02/01/2023     WBC Differential:   Recent Labs   Lab 01/28/23  0505 01/29/23  0301 01/30/23  0513 01/31/23  0523 02/01/23  0430   WBC 9.0 15.0* 13.1* 12.2* 12.1*   HGB 11.4* 11.4* 12.0* 12.3* 12.4*   HCT 37.1* 35.7* 37.9* 39.0* 39.5*    263 261 274 272   MCV 98.7* 97.0* 96.7* 95.6* 96.6*     BMP:   Lab Results   Component Value Date     02/01/2023    K 4.8 02/01/2023     12/29/2022    CO2 29 02/01/2023    BUN 20.5 02/01/2023    CREATININE 0.83 02/01/2023    GLU 82 12/29/2022    CALCIUM 9.3 02/01/2023    MG 2.10 02/01/2023    PHOS 4.4 02/01/2023     LFTs:   Lab Results   Component Value Date    PROT 6.4 12/29/2022    ALBUMIN 3.4 02/01/2023    BILITOT 0.4 02/01/2023    AST 10 02/01/2023    ALKPHOS 60 02/01/2023    ALT 13 02/01/2023     Microbiology Data:  Microbiology Results (last 7 days)       Procedure Component Value Units Date/Time    Blood culture x two cultures. Draw prior to antibiotics. [[id number]]  (Normal) Collected: 01/27/23 2311    Order Status: Completed Specimen: Blood from Hand, Right Updated: 02/01/23 0800     CULTURE, BLOOD ([facility]) No Growth At 96 Hours    Blood culture x two cultures. Draw prior to antibiotics. [[id number]]  (Normal) Collected: 01/27/23 2320    Order Status: Completed Specimen: Blood from Hand, Left Updated:  02/01/23 0800     CULTURE, BLOOD ([facility]) No Growth At 96 Hours    Respiratory Culture [[id number]] Collected: 01/29/23 1215    Order Status: Completed Specimen: Sputum, Expectorated Updated: 01/31/23 0817     Respiratory Culture Normal respiratory negin     GRAM STAIN Quality 1+      Moderate Gram positive cocci      Few Gram Positive Rods      Few Gram Negative Rods             Radiology:  Imaging Results              X-Ray Chest PA And Lateral (Final result)  Result time 01/27/23 22:30:03      Final result by Tio Deras MD (01/27/23 22:30:03)                   Impression:      No acute findings in the chest      Electronically signed by: Tio Deras MD  Date:    01/27/2023  Time:    22:30               Narrative:    EXAMINATION:  XR CHEST PA AND LATERAL    CLINICAL HISTORY:  Wheezing    COMPARISON:  01/05/2023    FINDINGS:  PA and lateral views of the chest show pulmonary hyperinflation without focal consolidation, pneumothorax or pleural effusion.  Cardiac silhouette and pulmonary vasculature are normal.  No acute osseous findings.                        ED Interpretation by Leo May DO (01/27/23 22:22:34, [facility], Emergency Medicine)    Emphysematous changes, no dense lobar consolidation.                                    Current Medications:     Infusions:       Scheduled:   albuterol-ipratropium  3 mL Nebulization Q6H WAKE    budesonide  0.5 mg Nebulization Q12H    cetirizine  10 mg Oral Daily    enoxaparin  40 mg Subcutaneous Daily    folic acid  1 mg Oral Daily    levETIRAcetam  500 mg Oral BID    levoFLOXacin  750 mg Oral Daily    LIDOcaine  2 patch Transdermal Q24H    multivitamin  1 tablet Oral Daily    thiamine  100 mg Oral Daily        PRN:  hydrALAZINE, lorazepam, melatonin, sodium chloride 0.9%    Assessment & Plan:     Seizure disorder  Fall/aspiration/seizure precautions in place. Neuro check q4.  Keppra 500 BID     Alcohol abuse/Polysubstance abuse  [facility]  assessment. Lorazepam 2mg for CIWA >8  Multivitamin, thiamine, and folic acid daily     COPD/Asthma  Solumederol 60mg q12h with duoneb q6hrs while awake and budesonide 0.5 mg  BID  currently sat at 94% on room air  Currently receiving Levaquin; Qtc 472 from 506; will cont.will Levaquin     A. fib  History of A. Fib. Previously on Digoxin and Eliquis but currently not taking  EKG NSR. holter not recommended at discharge.     HFrEF  Had Echo in 2019 with severely decreased left ventricular systolic function with estimated ejection fraction at 20%.  Additional finding of severe global hypokinesis with inferior posterior lateral wall motion abnormalities  Echo today revealed EF 35%; Lexiscan revealed EF 50%  Cards recommended metoprolol succinate 12.5 qd instead of acei outpatient for increase in preload      CODE STATUS:Full Code  Access: PIV  Antibiotics: Levaquin  Diet: Diet Soft & Bite Sized; NPO at midnight  DVT Prophylaxis: Lovenox ppx  GI Prophylaxis: Protonix  Fluids: None     Disposition: discharge tomorrow after meds to beds; patient will be discharged on Levoquin x5 days, Keppra 500 mg BID, metoprolol succinate 12.5 mg qd         Miguel Magdaleno MD     [facility] Resident HO-1  02/01/2023

## 2023-02-02 VITALS
BODY MASS INDEX: 21.78 KG/M2 | DIASTOLIC BLOOD PRESSURE: 64 MMHG | RESPIRATION RATE: 18 BRPM | WEIGHT: 147.06 LBS | TEMPERATURE: 98 F | HEIGHT: 69 IN | HEART RATE: 81 BPM | OXYGEN SATURATION: 97 % | SYSTOLIC BLOOD PRESSURE: 96 MMHG

## 2023-02-02 LAB
ALBUMIN SERPL-MCNC: 3.1 G/DL (ref 3.4–4.8)
ALBUMIN/GLOB SERPL: 1.3 RATIO (ref 1.1–2)
ALP SERPL-CCNC: 59 UNIT/L (ref 40–150)
ALT SERPL-CCNC: 11 UNIT/L (ref 0–55)
AST SERPL-CCNC: 9 UNIT/L (ref 5–34)
BACTERIA BLD CULT: NORMAL
BACTERIA BLD CULT: NORMAL
BASOPHILS # BLD AUTO: 0.01 X10(3)/MCL (ref 0–0.2)
BASOPHILS NFR BLD AUTO: 0.1 %
BILIRUBIN DIRECT+TOT PNL SERPL-MCNC: 0.4 MG/DL
BUN SERPL-MCNC: 23.7 MG/DL (ref 8.4–25.7)
CALCIUM SERPL-MCNC: 9.1 MG/DL (ref 8.8–10)
CHLORIDE SERPL-SCNC: 102 MMOL/L (ref 98–107)
CO2 SERPL-SCNC: 31 MMOL/L (ref 23–31)
CREAT SERPL-MCNC: 0.99 MG/DL (ref 0.73–1.18)
EOSINOPHIL # BLD AUTO: 0.02 X10(3)/MCL (ref 0–0.9)
EOSINOPHIL NFR BLD AUTO: 0.2 %
ERYTHROCYTE [DISTWIDTH] IN BLOOD BY AUTOMATED COUNT: 13.8 % (ref 11.5–17)
GFR SERPLBLD CREATININE-BSD FMLA CKD-EPI: >60 MLS/MIN/1.73/M2
GLOBULIN SER-MCNC: 2.3 GM/DL (ref 2.4–3.5)
GLUCOSE SERPL-MCNC: 84 MG/DL (ref 82–115)
HCT VFR BLD AUTO: 36.4 % (ref 42–52)
HGB BLD-MCNC: 11.5 GM/DL (ref 14–18)
IMM GRANULOCYTES # BLD AUTO: 0.06 X10(3)/MCL (ref 0–0.04)
IMM GRANULOCYTES NFR BLD AUTO: 0.7 %
LYMPHOCYTES # BLD AUTO: 1.96 X10(3)/MCL (ref 0.6–4.6)
LYMPHOCYTES NFR BLD AUTO: 22.8 %
MAGNESIUM SERPL-MCNC: 2 MG/DL (ref 1.6–2.6)
MCH RBC QN AUTO: 30.4 PG
MCHC RBC AUTO-ENTMCNC: 31.6 MG/DL (ref 33–36)
MCV RBC AUTO: 96.3 FL (ref 80–94)
MONOCYTES # BLD AUTO: 0.64 X10(3)/MCL (ref 0.1–1.3)
MONOCYTES NFR BLD AUTO: 7.5 %
NEUTROPHILS # BLD AUTO: 5.89 X10(3)/MCL (ref 2.1–9.2)
NEUTROPHILS NFR BLD AUTO: 68.7 %
NRBC BLD AUTO-RTO: 0 %
PHOSPHATE SERPL-MCNC: 4.2 MG/DL (ref 2.3–4.7)
PLATELET # BLD AUTO: 244 X10(3)/MCL (ref 130–400)
PMV BLD AUTO: 9.6 FL (ref 7.4–10.4)
POTASSIUM SERPL-SCNC: 4.2 MMOL/L (ref 3.5–5.1)
PROT SERPL-MCNC: 5.4 GM/DL (ref 5.8–7.6)
RBC # BLD AUTO: 3.78 X10(6)/MCL (ref 4.7–6.1)
SODIUM SERPL-SCNC: 141 MMOL/L (ref 136–145)
WBC # SPEC AUTO: 8.6 X10(3)/MCL (ref 4.5–11.5)

## 2023-02-02 PROCEDURE — 25000003 PHARM REV CODE 250: Performed by: INTERNAL MEDICINE

## 2023-02-02 PROCEDURE — 85025 COMPLETE CBC W/AUTO DIFF WBC: CPT | Performed by: INTERNAL MEDICINE

## 2023-02-02 PROCEDURE — 94761 N-INVAS EAR/PLS OXIMETRY MLT: CPT

## 2023-02-02 PROCEDURE — 80053 COMPREHEN METABOLIC PANEL: CPT | Performed by: INTERNAL MEDICINE

## 2023-02-02 PROCEDURE — 25000242 PHARM REV CODE 250 ALT 637 W/ HCPCS: Performed by: INTERNAL MEDICINE

## 2023-02-02 PROCEDURE — 83735 ASSAY OF MAGNESIUM: CPT | Performed by: INTERNAL MEDICINE

## 2023-02-02 PROCEDURE — G0378 HOSPITAL OBSERVATION PER HR: HCPCS

## 2023-02-02 PROCEDURE — 84100 ASSAY OF PHOSPHORUS: CPT | Performed by: INTERNAL MEDICINE

## 2023-02-02 RX ADMIN — THIAMINE HCL TAB 100 MG 100 MG: 100 TAB at 09:02

## 2023-02-02 RX ADMIN — METOPROLOL SUCCINATE 12.5 MG: 25 TABLET, EXTENDED RELEASE ORAL at 06:02

## 2023-02-02 RX ADMIN — LEVETIRACETAM 500 MG: 500 TABLET, FILM COATED ORAL at 09:02

## 2023-02-02 RX ADMIN — CETIRIZINE HYDROCHLORIDE 10 MG: 10 TABLET ORAL at 09:02

## 2023-02-02 RX ADMIN — FOLIC ACID 1 MG: 1 TABLET ORAL at 09:02

## 2023-02-02 RX ADMIN — MULTIPLE VITAMINS W/ MINERALS TAB 1 TABLET: TAB at 09:02

## 2023-02-02 RX ADMIN — LIDOCAINE 2 PATCH: 50 PATCH TOPICAL at 09:02

## 2023-02-02 RX ADMIN — BUDESONIDE 0.5 MG: 0.5 SUSPENSION RESPIRATORY (INHALATION) at 07:02

## 2023-02-02 RX ADMIN — IPRATROPIUM BROMIDE AND ALBUTEROL SULFATE 3 ML: 2.5; .5 SOLUTION RESPIRATORY (INHALATION) at 07:02

## 2023-02-02 NOTE — NURSING
"Tele monitor read v-fib. This nurse visualized through his open blinds and noted pt in bed "shaking" and inhaling and exhaling rapidly. This nurse knocked on the door and entered as patient continued his movements. This nurse observed the behavior as strange and not distress-like. This nurse asked the patient if he was masturbating, and the patient smiled and stated yesss, I just finished as he let out a long exhale and relaxed. Upon viewing the monitor, pt no longer in v-fib and stable at this time. Will make oncoming nurse aware of pt behavior and continue to monitor.  "

## 2023-02-02 NOTE — DISCHARGE SUMMARY
[facility] Discharge Summary    Admitting Physician: Reilly Collins MD  Attending Physician: Jyoti Tierney MD  Date of Admit: 1/27/2023  Date of Discharge: 2/2/2023    Condition: Good  Outcome: Patient tolerated treatment/procedure well without complication and is now ready for discharge.  DISPOSITION:  Homeless shelter     Discharge Diagnoses     Patient Active Problem List   Diagnosis    Acute exacerbation of chronic obstructive pulmonary disease (COPD)    Asthma    Seizure disorder    Tobacco abuse    PAF (paroxysmal atrial fibrillation)    Hypotension    Hypotension due to drugs    ETOH abuse    Chronic combined systolic and diastolic heart failure    Syncope and collapse    Anemia       Principal Problem:  Seizure disorder    Consultants and Procedures     Consultants:  IP CONSULT TO HOSPITAL MEDICINE  IP CONSULT TO CARDIOLOGY    Brief Admission History      This is a 62-year-old  male with past medical history a fractured skull (1989), atrial fibrillation, HFrEF (EF 25% 2019), temporary trach for resp failure, seizure disorder, and polysubstance abuse who presents to the emergency department via EMS for presumed seizure at primary residence which is [facility].  Patient does not recall having seizure but does admit to multiple hospitalizations in the past with most recent hospitalization January 5, 2023. Additioanlly, he states he was diagnosed with a seizure disorder approximately a year ago. However, through review of chart appears that patient has had several hospitalizations dating back to 2019. He states that he is compliant with his medications but then tells me that he has been out of his dilatin for 2 months now. However, per his chart he is on Keppra.  At time of evaluation patient complaining of minor headache.  Also endorses having shortness of breath and cough per the of green sputum which apparently has been ongoing for past 2  "months.  Otherwise denies lightheadedness, dizziness, myalgia, arthralgia, chest pain.  He is a current tobacco user. Began smoking at the age of 22 (2-3 packs). Recently cut down to half a pack/day.   He does admit to alcohol use. States he was not a drinker rather a "swimmer" indicating massive consumption up to a liter/day. Most recent drink was 3 days ago which was half a pint.  States he did every drug under the sun and is apparently has been sober for 3 months. Also endorsed remote IV drug use.     Hospital Course with Pertinent Findings     On the floor, patient was treated with Keppra 500 mg BID for his seizure disorder. His COPD exacerbation was treated with Solumederol 60mg q12h with duoneb q6hrs while awake and budesonide 0.5 mg  BID. He was sating well on room air. He received IV Levaquin. He has a history of HFrEF with echo in 2019 that showed EF of 20%. Repeat Echo showed EF 35%; Lexiscan revealed EF 50%. Cardio recommended metoprolol succinated 12.5 mg to increase preload. Patient did not have any seizures during his hospital course and tolerated treatment. Patient is stable for discharge.     Discharge physical exam:  Vitals  BP: (!) 85/61  Temp: 97.5 °F (36.4 °C)  Temp src: Oral  Pulse: 96  Resp: 18  SpO2: (!) 94 %  Height: 5' 9" (175.3 cm)  Weight: 66.7 kg (147 lb 0.8 oz)    General:  Well developed, well nourished, no acute respiratory distress  Head: Normocephalic, atraumatic  Eyes: PERRL, EOMI, anicteric sclera  Throat: No posterior pharyngeal erythema or exudate, no tonsillar exudate  Neck: supple, normal ROM, no JVD  CVS:  RRR, S1 and S2 normal, no murmurs, no added heart sounds, rubs, gallops, regular peripheral pulses, and no peripheral edema  Resp:  Lungs clear to auscultation bilaterally, no wheezes, rales, or rhonci  GI:  Abdomen soft, non-tender, non-distended, normoactive bowel sounds  MSK:  Full range of motion, no obvious deformities   Skin:  No rashes, ulcers, erythema  Neuro:  Alert " and oriented x3, No focal neuro deficits, CNII-XII grossly intact  Psych:  Appropriate mood and affect     TIME SPENT ON DISCHARGE: 60 minutes    Discharge Medications        Medication List        START taking these medications      levoFLOXacin 750 MG tablet  Commonly known as: LEVAQUIN  Take 1 tablet (750 mg total) by mouth once daily. for 5 days     metoprolol succinate 25 MG 24 hr tablet  Commonly known as: TOPROL-XL  Take 0.5 tablets (12.5 mg total) by mouth once daily.            CHANGE how you take these medications      levETIRAcetam 500 MG Tab  Commonly known as: KEPPRA  Take 1 tablet (500 mg total) by mouth 2 (two) times daily.  What changed:   medication strength  how much to take            CONTINUE taking these medications      albuterol 90 mcg/actuation inhaler  Commonly known as: PROVENTIL/VENTOLIN HFA  Inhale 1-2 puffs into the lungs every 6 (six) hours as needed for Wheezing or Shortness of Breath. Rescue            STOP taking these medications      aspirin 81 MG EC tablet  Commonly known as: ECOTRIN     cetirizine 10 MG tablet  Commonly known as: ZYRTEC     folic acid 1 MG tablet  Commonly known as: FOLVITE     ibuprofen 600 MG tablet  Commonly known as: ADVIL,MOTRIN     lisinopriL 2.5 MG tablet  Commonly known as: PRINIVIL,ZESTRIL     multivitamin per tablet  Commonly known as: THERAGRAN               Where to Get Your Medications        These medications were sent to [address] [address]      Phone: [phone]   levETIRAcetam 500 MG Tab  levoFLOXacin 750 MG tablet  metoprolol succinate 25 MG 24 hr tablet         Discharge Information:   Pierre Pierson is being discharged Home or Self Care.    Discharge Procedure Orders   Ambulatory referral/consult to Cardiology   Standing Status: Future   Referral Priority: Routine Referral Type: Consultation   Referral Reason: Specialty Services Required    Requested Specialty: Cardiology   Number of Visits Requested: 1        Follow-Up Appointments:   Follow-up Information       [facility] .    Specialty: Emergency Medicine  Why: As needed, If symptoms worsen  Contact information:  [address] [place] [id number] [phone]             [facility] Follow up in 1 month(s).    Specialty: Cardiology  Contact information:  [address] [place] [id number] [phone]             [facility]. Go in 2 week(s).    Specialty: Family Medicine  Why: post-ivan clinic  Contact information:  [address] [place] [id number] [phone]                           To address at follow-up:  -The following labs are to be drawn at the Post Ivan visit: none  -The following imaging studies are to be ordered at the post ivan visit: none    The above information was discussed with the patient in clear terms. Patient was able to repeat the instructions to me in their own words. All questions answered. ED precautions provided.    Miguel Magdaleno MD     [facility] Family Medicine Resident HO-1  02/02/2023

## 2023-02-05 ENCOUNTER — HOSPITAL ENCOUNTER (EMERGENCY)
Facility: HOSPITAL | Age: 63
Discharge: HOME | End: 2023-02-05
Attending: FAMILY MEDICINE
Payer: MEDICAID

## 2023-02-05 VITALS
DIASTOLIC BLOOD PRESSURE: 74 MMHG | HEART RATE: 76 BPM | OXYGEN SATURATION: 96 % | HEIGHT: 69 IN | TEMPERATURE: 100 F | BODY MASS INDEX: 20.9 KG/M2 | WEIGHT: 141.13 LBS | SYSTOLIC BLOOD PRESSURE: 114 MMHG | RESPIRATION RATE: 18 BRPM

## 2023-02-05 DIAGNOSIS — Z87.898 HISTORY OF SEIZURE: Primary | ICD-10-CM

## 2023-02-05 DIAGNOSIS — G40.909 SEIZURE DISORDER: ICD-10-CM

## 2023-02-05 DIAGNOSIS — R57.1 HYPOVOLEMIC SHOCK: ICD-10-CM

## 2023-02-05 DIAGNOSIS — R56.9 SEIZURE: ICD-10-CM

## 2023-02-05 LAB
ALBUMIN SERPL-MCNC: 3.6 G/DL (ref 3.4–4.8)
ALBUMIN/GLOB SERPL: 1.3 RATIO (ref 1.1–2)
ALP SERPL-CCNC: 72 UNIT/L (ref 40–150)
ALT SERPL-CCNC: 19 UNIT/L (ref 0–55)
APPEARANCE UR: CLEAR
AST SERPL-CCNC: 18 UNIT/L (ref 5–34)
BACTERIA #/AREA URNS AUTO: ABNORMAL /HPF
BASOPHILS # BLD AUTO: 0.03 X10(3)/MCL (ref 0–0.2)
BASOPHILS NFR BLD AUTO: 0.1 %
BILIRUB UR QL STRIP.AUTO: NEGATIVE MG/DL
BILIRUBIN DIRECT+TOT PNL SERPL-MCNC: 1.3 MG/DL
BUN SERPL-MCNC: 16.5 MG/DL (ref 8.4–25.7)
CALCIUM SERPL-MCNC: 9 MG/DL (ref 8.8–10)
CHLORIDE SERPL-SCNC: 101 MMOL/L (ref 98–107)
CO2 SERPL-SCNC: 30 MMOL/L (ref 23–31)
COLOR UR AUTO: ABNORMAL
CREAT SERPL-MCNC: 0.68 MG/DL (ref 0.73–1.18)
EOSINOPHIL # BLD AUTO: 0.15 X10(3)/MCL (ref 0–0.9)
EOSINOPHIL NFR BLD AUTO: 0.7 %
ERYTHROCYTE [DISTWIDTH] IN BLOOD BY AUTOMATED COUNT: 13.7 % (ref 11.5–17)
FLUAV AG UPPER RESP QL IA.RAPID: NOT DETECTED
FLUBV AG UPPER RESP QL IA.RAPID: NOT DETECTED
GFR SERPLBLD CREATININE-BSD FMLA CKD-EPI: >60 MLS/MIN/1.73/M2
GLOBULIN SER-MCNC: 2.8 GM/DL (ref 2.4–3.5)
GLUCOSE SERPL-MCNC: 98 MG/DL (ref 82–115)
GLUCOSE UR QL STRIP.AUTO: NORMAL MG/DL
HCT VFR BLD AUTO: 40.1 % (ref 42–52)
HGB BLD-MCNC: 12.8 GM/DL (ref 14–18)
HYALINE CASTS #/AREA URNS LPF: ABNORMAL /LPF
IMM GRANULOCYTES # BLD AUTO: 0.17 X10(3)/MCL (ref 0–0.04)
IMM GRANULOCYTES NFR BLD AUTO: 0.7 %
KETONES UR QL STRIP.AUTO: NEGATIVE MG/DL
LACTATE SERPL-SCNC: 1.1 MMOL/L (ref 0.5–2.2)
LEUKOCYTE ESTERASE UR QL STRIP.AUTO: NEGATIVE UNIT/L
LYMPHOCYTES # BLD AUTO: 0.77 X10(3)/MCL (ref 0.6–4.6)
LYMPHOCYTES NFR BLD AUTO: 3.4 %
MAGNESIUM SERPL-MCNC: 2.2 MG/DL (ref 1.6–2.6)
MCH RBC QN AUTO: 30.8 PG
MCHC RBC AUTO-ENTMCNC: 31.9 MG/DL (ref 33–36)
MCV RBC AUTO: 96.4 FL (ref 80–94)
MONOCYTES # BLD AUTO: 1.05 X10(3)/MCL (ref 0.1–1.3)
MONOCYTES NFR BLD AUTO: 4.6 %
NEUTROPHILS # BLD AUTO: 20.71 X10(3)/MCL (ref 2.1–9.2)
NEUTROPHILS NFR BLD AUTO: 90.5 %
NITRITE UR QL STRIP.AUTO: NEGATIVE
NRBC BLD AUTO-RTO: 0 %
PH UR STRIP.AUTO: 7 [PH]
PLATELET # BLD AUTO: 255 X10(3)/MCL (ref 130–400)
PLATELET # BLD EST: ADEQUATE 10*3/UL
PMV BLD AUTO: 9.6 FL (ref 7.4–10.4)
POTASSIUM SERPL-SCNC: 4.1 MMOL/L (ref 3.5–5.1)
PROT SERPL-MCNC: 6.4 GM/DL (ref 5.8–7.6)
PROT UR QL STRIP.AUTO: NEGATIVE MG/DL
RBC # BLD AUTO: 4.16 X10(6)/MCL (ref 4.7–6.1)
RBC #/AREA URNS AUTO: ABNORMAL /HPF
RBC MORPH BLD: NORMAL
RBC UR QL AUTO: NEGATIVE UNIT/L
SARS-COV-2 RNA RESP QL NAA+PROBE: NOT DETECTED
SODIUM SERPL-SCNC: 137 MMOL/L (ref 136–145)
SP GR UR STRIP.AUTO: 1.02
SQUAMOUS #/AREA URNS LPF: ABNORMAL /HPF
TROPONIN I SERPL-MCNC: <0.01 NG/ML (ref 0–0.04)
UROBILINOGEN UR STRIP-ACNC: NORMAL MG/DL
WBC # SPEC AUTO: 22.9 X10(3)/MCL (ref 4.5–11.5)
WBC #/AREA URNS AUTO: ABNORMAL /HPF

## 2023-02-05 PROCEDURE — 25000003 PHARM REV CODE 250: Performed by: FAMILY MEDICINE

## 2023-02-05 PROCEDURE — 83735 ASSAY OF MAGNESIUM: CPT | Performed by: FAMILY MEDICINE

## 2023-02-05 PROCEDURE — 80053 COMPREHEN METABOLIC PANEL: CPT | Performed by: FAMILY MEDICINE

## 2023-02-05 PROCEDURE — 99285 EMERGENCY DEPT VISIT HI MDM: CPT | Mod: 25

## 2023-02-05 PROCEDURE — 85025 COMPLETE CBC W/AUTO DIFF WBC: CPT | Performed by: FAMILY MEDICINE

## 2023-02-05 PROCEDURE — 83605 ASSAY OF LACTIC ACID: CPT | Performed by: FAMILY MEDICINE

## 2023-02-05 PROCEDURE — 81001 URINALYSIS AUTO W/SCOPE: CPT | Performed by: FAMILY MEDICINE

## 2023-02-05 PROCEDURE — 96375 TX/PRO/DX INJ NEW DRUG ADDON: CPT

## 2023-02-05 PROCEDURE — 93005 ELECTROCARDIOGRAM TRACING: CPT

## 2023-02-05 PROCEDURE — 96365 THER/PROPH/DIAG IV INF INIT: CPT

## 2023-02-05 PROCEDURE — 63600175 PHARM REV CODE 636 W HCPCS: Performed by: FAMILY MEDICINE

## 2023-02-05 PROCEDURE — 96361 HYDRATE IV INFUSION ADD-ON: CPT

## 2023-02-05 PROCEDURE — 87040 BLOOD CULTURE FOR BACTERIA: CPT | Performed by: FAMILY MEDICINE

## 2023-02-05 PROCEDURE — 0240U COVID/FLU A&B PCR: CPT | Performed by: FAMILY MEDICINE

## 2023-02-05 PROCEDURE — 84484 ASSAY OF TROPONIN QUANT: CPT | Performed by: FAMILY MEDICINE

## 2023-02-05 RX ORDER — SODIUM CHLORIDE 9 MG/ML
1000 INJECTION, SOLUTION INTRAVENOUS
Status: COMPLETED | OUTPATIENT
Start: 2023-02-05 | End: 2023-02-05

## 2023-02-05 RX ADMIN — SODIUM CHLORIDE 1000 ML: 9 INJECTION, SOLUTION INTRAVENOUS at 06:02

## 2023-02-05 RX ADMIN — SODIUM CHLORIDE 1000 ML: 9 INJECTION, SOLUTION INTRAVENOUS at 07:02

## 2023-02-05 RX ADMIN — SODIUM CHLORIDE 1000 ML: 9 INJECTION, SOLUTION INTRAVENOUS at 08:02

## 2023-02-05 RX ADMIN — SODIUM CHLORIDE 1920 ML: 9 INJECTION, SOLUTION INTRAVENOUS at 07:02

## 2023-02-05 RX ADMIN — PIPERACILLIN AND TAZOBACTAM 4.5 G: 4; .5 INJECTION, POWDER, LYOPHILIZED, FOR SOLUTION INTRAVENOUS; PARENTERAL at 08:02

## 2023-02-05 RX ADMIN — VANCOMYCIN HYDROCHLORIDE 1750 MG: 1 INJECTION, POWDER, LYOPHILIZED, FOR SOLUTION INTRAVENOUS at 08:02

## 2023-02-05 NOTE — ED PROVIDER NOTES
Encounter Date: 2/5/2023       History     Chief Complaint   Patient presents with    Seizures     Pt brought in by aasi after having a seizure witnessed. He takes Keppra, The homeless shelter dispenses his medication. GCS 14.      Patient presents with seizures via EMS.  Patient reports he lives in a homeless shelter and bystanders witnessed event.  Current GCS 15.  Patient denies chest pain, shortness of breath, headache, fever, chills, weakness, dizziness.    Reviewed medical documentation-patient recently discharge from [facility] for COPD exacerbation and seizures    The history is provided by the patient. No  was used.   Review of patient's allergies indicates:   Allergen Reactions    Keflex [cephalexin]      Past Medical History:   Diagnosis Date    Asthma     COPD (chronic obstructive pulmonary disease)     Seizures      History reviewed. No pertinent surgical history.  History reviewed. No pertinent family history.  Social History     Tobacco Use    Smoking status: Every Day     Packs/day: 0.50     Types: Cigarettes    Smokeless tobacco: Never   Substance Use Topics    Alcohol use: Yes     Alcohol/week: 2.0 standard drinks     Types: 2 Cans of beer per week     Comment: daily (pint of vodka per day)    Drug use: Yes     Types: Marijuana     Comment: daily     Review of Systems   Constitutional:  Negative for chills and fever.   HENT:  Negative for sore throat.    Respiratory:  Negative for cough, shortness of breath, wheezing and stridor.    Cardiovascular:  Negative for chest pain, palpitations and leg swelling.   Gastrointestinal:  Negative for nausea.   Genitourinary:  Negative for dysuria.   Musculoskeletal:  Negative for back pain.   Skin:  Negative for rash.   Neurological:  Positive for seizures. Negative for dizziness, weakness, light-headedness and headaches.     Physical Exam     Initial Vitals [02/05/23 0538]   BP Pulse Resp Temp SpO2   90/69 91 16 99.5 °F (37.5 °C) (!) 94 %      MAP        --         Physical Exam    Nursing note and vitals reviewed.  Constitutional: He appears well-developed and well-nourished. No distress.   Patient resting comfortably in hospital bed.  Patient is not ill or septic appearing   HENT:   Head: Normocephalic and atraumatic.   Eyes: Conjunctivae are normal.   Cardiovascular:  Normal heart sounds and intact distal pulses.           Pulmonary/Chest: Breath sounds normal. No respiratory distress. He has no wheezes.   Abdominal: Abdomen is soft. Bowel sounds are normal. There is no abdominal tenderness. There is no rebound and no guarding.   Musculoskeletal:         General: Normal range of motion.     Neurological: He is alert and oriented to person, place, and time. He has normal strength. No cranial nerve deficit or sensory deficit. Gait normal. GCS score is 15. GCS eye subscore is 4. GCS verbal subscore is 5. GCS motor subscore is 6.   Skin: Skin is warm and dry. Capillary refill takes less than 2 seconds.   Psychiatric: He has a normal mood and affect. His behavior is normal. Judgment and thought content normal.       ED Course   Critical Care    Date/Time: 2/5/2023 6:40 AM  Performed by: Kimmy Zapata MD  Authorized by: Kimmy Zapata MD   Total critical care time (exclusive of procedural time) : 0 minutes  Critical care was necessary to treat or prevent imminent or life-threatening deterioration of the following conditions: sepsis, dehydration and metabolic crisis.  Critical care was time spent personally by me on the following activities: development of treatment plan with patient or surrogate, evaluation of patient's response to treatment, examination of patient, obtaining history from patient or surrogate, ordering and performing treatments and interventions, ordering and review of laboratory studies, ordering and review of radiographic studies, pulse oximetry, re-evaluation of patient's condition and review of old charts.      ED US Echo    Date/Time: 2/5/2023 8:47  AM  Performed by: Ovidio Robles MD  Authorized by: Ovidio Robles MD     Indication:  Hypotension  Identified Structures:     The pericardial sac, myocardium, and 4 chambers were identified with the following echocardiographic windows:  Parasternal long axis, IVC and Apical 4-chamber  Findings:     Pericardial Effusion:  Absent    Left Ventricle Ejection Fraction:  Moderately reduced (35-55%)  IVC:     Diameter:  < 2cm    Collapsibility:  < 50%    Gross S[place] (RV:LV):  Normal    Impression:  Diminished LV function    Charge?:  No (educational)  Labs Reviewed   COMPREHENSIVE METABOLIC PANEL - Abnormal; Notable for the following components:       Result Value    Creatinine 0.68 (*)     All other components within normal limits   CBC WITH DIFFERENTIAL - Abnormal; Notable for the following components:    WBC 22.9 (*)     RBC 4.16 (*)     Hgb 12.8 (*)     Hct 40.1 (*)     MCV 96.4 (*)     MCHC 31.9 (*)     Neut # 20.71 (*)     IG# 0.17 (*)     All other components within normal limits   URINALYSIS - Abnormal; Notable for the following components:    Squamous Epithelial Cells, UA Trace (*)     Hyaline Casts, UA 0-2 (*)     All other components within normal limits   MAGNESIUM - Normal   TROPONIN I - Normal   BLOOD SMEAR MICROSCOPIC EXAM (OLG) - Normal   LACTIC ACID, PLASMA - Normal   COVID/FLU A&B PCR - Normal    Narrative:     The Xpert Xpress SARS-CoV-2/FLU/RSV plus is a rapid, multiplexed real-time PCR test intended for the simultaneous qualitative detection and differentiation of SARS-CoV-2, Influenza A, Influenza B, and respiratory syncytial virus (RSV) viral RNA in either nasopharyngeal swab or nasal swab specimens.         BLOOD CULTURE [facility]   BLOOD CULTURE [facility]   CBC W/ AUTO DIFFERENTIAL    Narrative:     The following orders were created for panel order CBC auto differential.  Procedure                               Abnormality         Status                     ---------                                -----------         ------                     CBC with Differential[[id number]]        Abnormal            Final result                 Please view results for these tests on the individual orders.   EXTRA TUBES    Narrative:     The following orders were created for panel order EXTRA TUBES.  Procedure                               Abnormality         Status                     ---------                               -----------         ------                     Light Blue Top Hold[[id number]]                              In process                 Red Top Hold[[id number]]                                     In process                 Lavender Top Hold[[id number]]                                In process                 Gold Top Hold[[id number]]                                    In process                   Please view results for these tests on the individual orders.   LIGHT BLUE TOP HOLD   RED TOP HOLD   LAVENDER TOP HOLD   GOLD TOP HOLD   EXTRA TUBES    Narrative:     The following orders were created for panel order EXTRA TUBES.  Procedure                               Abnormality         Status                     ---------                               -----------         ------                     Red Top Hold[[id number]]                                     In process                   Please view results for these tests on the individual orders.   RED TOP HOLD          Imaging Results              X-Ray Chest 1 View (Final result)  Result time 02/05/23 08:04:53      Final result by Jarret Bhakta MD (02/05/23 08:04:53)                   Impression:      No significant interval change.      Electronically signed by: Jarret Bhakta  Date:    02/05/2023  Time:    08:04               Narrative:    EXAMINATION:  XR CHEST 1 VIEW    CLINICAL HISTORY:  Unspecified convulsions    COMPARISON:  29 January 2023    FINDINGS:  Portable frontal view of the chest was obtained. The heart is not significantly  enlarged.  There is aortic atherosclerosis.  There are some chronic appearing interstitial opacities similar to prior.  No new focal consolidation or pneumothorax.                                    X-Rays:   Independently Interpreted Readings:   Chest X-Ray: Normal heart size. COPD changes, No consolidations or infiltrates   Medications   sodium chloride 0.9% bolus 1,000 mL 1,000 mL (0 mLs Intravenous Stopped 2/5/23 0703)   sodium chloride 0.9% bolus 1,000 mL 1,000 mL (0 mLs Intravenous Stopped 2/5/23 0823)   sodium chloride 0.9% bolus 1,920 mL 1,920 mL (0 mLs Intravenous Stopped 2/5/23 0840)   piperacillin-tazobactam (ZOSYN) 4.5 g in sodium chloride 0.9 % 100 mL IVPB (MB+) (0 g Intravenous Stopped 2/5/23 0834)   vancomycin (VANCOCIN) 1,750 mg in sodium chloride 0.9% 500 mL IVPB (1,750 mg Intravenous New Bag 2/5/23 0804)   0.9%  NaCl infusion (1,000 mLs Intravenous New Bag 2/5/23 0834)                 ED Course as of 02/05/23 1029   Sun Feb 05, 2023   0710 Reviewed labs.  Re-examined patient.  Leukocytosis could be secondary to seizures versus steroids induced.  No signs of infection found at this time lactic acid will cultures ordered 30 cc/kilos ordered antibiotics ordered. [AK]   0715 Patient care transitioned to Dr. Ann. [AK]      ED Course User Index  [AK] Kimmy Zapata MD          10:28 AM    Pt have remain stable during ED observation, normotensive after adequate fluid resuscitation. Pt AAOx3, no new seizure activity, no focus of infection. Will discharge home with F/U as outpatient. Adequate instructions provided       Clinical Impression:   Final diagnoses:  [R56.9] Seizure  [Z87.898] History of seizure (Primary)  [G40.909] Seizure disorder  [R57.1] Hypovolemic shock        ED Disposition Condition    Discharge Stable          ED Prescriptions    None       Follow-up Information       Follow up With Specialties Details Why Contact Info    [facility]  In 2 days  [address]  [facility] [address]  [phone]      [facility] Emergency Medicine  As needed, If symptoms worsen [address] [facility] [id number] [phone]    [facility]  In 1 week  [address] [address]  [phone]      [facility] Emergency Medicine  If symptoms worsen [address] [facility] [id number] [phone]             Kimmy Zapata MD  02/05/23 0812       Ovidio Robles MD  02/05/23 1028       Ovidio Robles MD  02/05/23 1024

## 2023-02-10 LAB
BACTERIA BLD CULT: NORMAL
BACTERIA BLD CULT: NORMAL

## 2023-02-27 ENCOUNTER — HOSPITAL ENCOUNTER (EMERGENCY)
Facility: HOSPITAL | Age: 63
Discharge: HOME | End: 2023-02-27
Attending: EMERGENCY MEDICINE
Payer: MEDICAID

## 2023-02-27 VITALS
SYSTOLIC BLOOD PRESSURE: 100 MMHG | HEIGHT: 69 IN | HEART RATE: 66 BPM | RESPIRATION RATE: 17 BRPM | BODY MASS INDEX: 21.78 KG/M2 | OXYGEN SATURATION: 96 % | TEMPERATURE: 99 F | DIASTOLIC BLOOD PRESSURE: 72 MMHG | WEIGHT: 147.06 LBS

## 2023-02-27 DIAGNOSIS — G40.919 BREAKTHROUGH SEIZURE: Primary | ICD-10-CM

## 2023-02-27 LAB
ANION GAP SERPL CALC-SCNC: 7 MEQ/L
APPEARANCE UR: CLEAR
BACTERIA #/AREA URNS AUTO: ABNORMAL /HPF
BASOPHILS # BLD AUTO: 0.04 X10(3)/MCL (ref 0–0.2)
BASOPHILS NFR BLD AUTO: 0.6 %
BILIRUB UR QL STRIP.AUTO: NEGATIVE MG/DL
BUN SERPL-MCNC: 20.8 MG/DL (ref 8.4–25.7)
CALCIUM SERPL-MCNC: 9.6 MG/DL (ref 8.8–10)
CHLORIDE SERPL-SCNC: 100 MMOL/L (ref 98–107)
CO2 SERPL-SCNC: 33 MMOL/L (ref 23–31)
COLOR UR AUTO: ABNORMAL
CREAT SERPL-MCNC: 0.81 MG/DL (ref 0.73–1.18)
CREAT/UREA NIT SERPL: 26
EOSINOPHIL # BLD AUTO: 0.2 X10(3)/MCL (ref 0–0.9)
EOSINOPHIL NFR BLD AUTO: 2.9 %
ERYTHROCYTE [DISTWIDTH] IN BLOOD BY AUTOMATED COUNT: 13.2 % (ref 11.5–17)
GFR SERPLBLD CREATININE-BSD FMLA CKD-EPI: >60 MLS/MIN/1.73/M2
GLUCOSE SERPL-MCNC: 100 MG/DL (ref 82–115)
GLUCOSE UR QL STRIP.AUTO: NORMAL MG/DL
HCT VFR BLD AUTO: 41.5 % (ref 42–52)
HGB BLD-MCNC: 12.9 G/DL (ref 14–18)
HYALINE CASTS #/AREA URNS LPF: ABNORMAL /LPF
IMM GRANULOCYTES # BLD AUTO: 0.03 X10(3)/MCL (ref 0–0.04)
IMM GRANULOCYTES NFR BLD AUTO: 0.4 %
KETONES UR QL STRIP.AUTO: NEGATIVE MG/DL
LEUKOCYTE ESTERASE UR QL STRIP.AUTO: NEGATIVE UNIT/L
LYMPHOCYTES # BLD AUTO: 1.5 X10(3)/MCL (ref 0.6–4.6)
LYMPHOCYTES NFR BLD AUTO: 22.1 %
MAGNESIUM SERPL-MCNC: 1.9 MG/DL (ref 1.6–2.6)
MCH RBC QN AUTO: 30.1 PG
MCHC RBC AUTO-ENTMCNC: 31.1 G/DL (ref 33–36)
MCV RBC AUTO: 97 FL (ref 80–94)
MONOCYTES # BLD AUTO: 0.62 X10(3)/MCL (ref 0.1–1.3)
MONOCYTES NFR BLD AUTO: 9.1 %
MUCOUS THREADS URNS QL MICRO: ABNORMAL /LPF
NEUTROPHILS # BLD AUTO: 4.41 X10(3)/MCL (ref 2.1–9.2)
NEUTROPHILS NFR BLD AUTO: 64.9 %
NITRITE UR QL STRIP.AUTO: NEGATIVE
NRBC BLD AUTO-RTO: 0 %
PH UR STRIP.AUTO: 5.5 [PH]
PLATELET # BLD AUTO: 291 X10(3)/MCL (ref 130–400)
PMV BLD AUTO: 9.8 FL (ref 7.4–10.4)
POCT GLUCOSE: 108 MG/DL (ref 70–110)
POTASSIUM SERPL-SCNC: 4 MMOL/L (ref 3.5–5.1)
PROT UR QL STRIP.AUTO: NEGATIVE MG/DL
RBC # BLD AUTO: 4.28 X10(6)/MCL (ref 4.7–6.1)
RBC #/AREA URNS AUTO: ABNORMAL /HPF
RBC UR QL AUTO: NEGATIVE UNIT/L
SODIUM SERPL-SCNC: 140 MMOL/L (ref 136–145)
SP GR UR STRIP.AUTO: 1.02
SQUAMOUS #/AREA URNS LPF: ABNORMAL /HPF
UROBILINOGEN UR STRIP-ACNC: NORMAL MG/DL
WBC # SPEC AUTO: 6.8 X10(3)/MCL (ref 4.5–11.5)
WBC #/AREA URNS AUTO: ABNORMAL /HPF

## 2023-02-27 PROCEDURE — 83735 ASSAY OF MAGNESIUM: CPT | Performed by: EMERGENCY MEDICINE

## 2023-02-27 PROCEDURE — 80048 BASIC METABOLIC PNL TOTAL CA: CPT | Performed by: EMERGENCY MEDICINE

## 2023-02-27 PROCEDURE — 81001 URINALYSIS AUTO W/SCOPE: CPT | Performed by: EMERGENCY MEDICINE

## 2023-02-27 PROCEDURE — 99283 EMERGENCY DEPT VISIT LOW MDM: CPT

## 2023-02-27 PROCEDURE — 82962 GLUCOSE BLOOD TEST: CPT

## 2023-02-27 PROCEDURE — 85025 COMPLETE CBC W/AUTO DIFF WBC: CPT | Performed by: EMERGENCY MEDICINE

## 2023-02-27 RX ORDER — LEVETIRACETAM 500 MG/1
750 TABLET ORAL 2 TIMES DAILY
Qty: 90 TABLET | Refills: 0 | Status: SHIPPED | OUTPATIENT
Start: 2023-02-27 | End: 2023-03-20 | Stop reason: DRUGHIGH

## 2023-02-27 NOTE — ED PROVIDER NOTES
Encounter Date: 2/27/2023       History     Chief Complaint   Patient presents with    Seizures     Arrives via EMS with reported possible seizure at the shelter. Alert and oriented on arrival; no complaint. Reports hx of sz, has not taken meds in 2 days     Mr. Pierre Pierson is a 63 yo male who presents with chief complaint seizure. Onset was just prior to arrival when patient states that he was lying in his caught and reportedly had a witnessed seizure.  He states that he does not know when these are going to happened, does not get any or prior to having a seizure.  States otherwise he has been in his normal state of health and is not voicing any complaints at this time.  He states that he takes his medication whenever it is made available to him at the shelter that he lives at, stating they may not always give him his medication at the same time but he is taking it twice a day as directed.  Denies any recent drug use states he is not drank any alcohol in over a month now.    The history is provided by the patient.   Review of patient's allergies indicates:   Allergen Reactions    Keflex [cephalexin]      Past Medical History:   Diagnosis Date    Asthma     COPD (chronic obstructive pulmonary disease)     Seizures      History reviewed. No pertinent surgical history.  History reviewed. No pertinent family history.  Social History     Tobacco Use    Smoking status: Every Day     Packs/day: 0.50     Types: Cigarettes    Smokeless tobacco: Never   Substance Use Topics    Alcohol use: Yes     Alcohol/week: 2.0 standard drinks     Types: 2 Cans of beer per week     Comment: daily (pint of vodka per day)    Drug use: Yes     Types: Marijuana     Comment: daily     Review of Systems    Physical Exam     Initial Vitals [02/27/23 1423]   BP Pulse Resp Temp SpO2   90/71 65 16 98.6 °F (37 °C) 95 %      MAP       --         Physical Exam    Nursing note and vitals reviewed.  Constitutional: He appears well-developed and  well-nourished. No distress.   HENT:   Head: Normocephalic and atraumatic.   Nose: Nose normal.   Mouth/Throat: Oropharynx is clear and moist and mucous membranes are normal.   Eyes: Conjunctivae and EOM are normal. Pupils are equal, round, and reactive to light.   Neck: Neck supple.   Cardiovascular:  Normal rate, regular rhythm, normal heart sounds, intact distal pulses and normal pulses.           Pulmonary/Chest: Effort normal and breath sounds normal. No respiratory distress.   Abdominal: Abdomen is soft. There is no abdominal tenderness. There is no rebound and no guarding.   Musculoskeletal:         General: Normal range of motion.      Cervical back: Neck supple.     Neurological: He is alert and oriented to person, place, and time. GCS score is 15.   CN II-XII intact. Moves all extremities. No gross sensory or motor deficits.   Skin: Skin is warm, dry and intact. Capillary refill takes less than 2 seconds.   Psychiatric: He has a normal mood and affect. His speech is normal and behavior is normal. Judgment and thought content normal. Cognition and memory are normal.       ED Course   Procedures  Labs Reviewed   BASIC METABOLIC PANEL - Abnormal; Notable for the following components:       Result Value    Carbon Dioxide 33 (*)     All other components within normal limits   URINALYSIS, REFLEX TO URINE CULTURE - Abnormal; Notable for the following components:    Mucous, UA Trace (*)     All other components within normal limits   CBC WITH DIFFERENTIAL - Abnormal; Notable for the following components:    RBC 4.28 (*)     Hgb 12.9 (*)     Hct 41.5 (*)     MCV 97.0 (*)     MCHC 31.1 (*)     All other components within normal limits   MAGNESIUM - Normal   CBC W/ AUTO DIFFERENTIAL    Narrative:     The following orders were created for panel order CBC auto differential.  Procedure                               Abnormality         Status                     ---------                               -----------          ------                     CBC with Differential[[id number]]        Abnormal            Final result                 Please view results for these tests on the individual orders.   EXTRA TUBES    Narrative:     The following orders were created for panel order EXTRA TUBES.  Procedure                               Abnormality         Status                     ---------                               -----------         ------                     Light Blue Top Hold[[id number]]                              In process                 Gold Top Hold[[id number]]                                    In process                   Please view results for these tests on the individual orders.   LIGHT BLUE TOP HOLD   GOLD TOP HOLD   POCT GLUCOSE          Imaging Results    None          Medications - No data to display  Medical Decision Making:   History:   Old Medical Records: I decided to obtain old medical records.  Initial Assessment:   62-year-old male known history of seizures presents for evaluation of seizure.  Patient staying at homeless shelter and states that the staff at the shelter are responsible for distributing his medication, which he states he has been compliant with.  He is not voicing any complaints and he has no focal neurologic deficits on exam.  Labs are grossly unremarkable.  Review of medical record shows that he is had multiple visits in the past couple of months for his seizures, and I discussed increasing his Keppra which he is agreeable to doing.  I will increase his Keppra to 750 mg b.i.d..  I have spoken with the patient and/or caregivers. I have explained the patient's condition, diagnoses and treatment plan based on the information available to me at this time. I have answered the patient's and/or caregiver's questions and addressed any concerns. The patient and/or caregivers have as good an understanding of the patient's diagnosis, condition and treatment plan as can be expected at this point. The  vital signs have been stable. The patient's condition is stable and appropriate for discharge from the emergency department.     The patient will pursue further outpatient evaluation with the primary care physician or other designated or consulting physician as outlined in the discharge instructions. The patient and/or caregivers are agreeable to this plan of care and follow-up instructions have been explained in detail. The patient and/or caregivers have received these instructions in written format and have expressed an understanding of the discharge instructions. The patient and/or caregivers are aware that any significant change in condition or worsening of symptoms should prompt an immediate return to this or the closest emergency department or a call to 911.  Clinical Tests:   Lab Tests: Ordered and Reviewed                        Clinical Impression:   Final diagnoses:  [G40.919] Breakthrough seizure (Primary)        ED Disposition Condition    Discharge Stable          ED Prescriptions       Medication Sig Dispense Start Date End Date Auth. Provider    levETIRAcetam (KEPPRA) 500 MG Tab Take 1.5 tablets (750 mg total) by mouth 2 (two) times daily. 90 tablet 2/27/2023 3/29/2023 Leo May DO          Follow-up Information    None          Leo May DO  03/01/23 0203

## 2023-03-20 ENCOUNTER — HOSPITAL ENCOUNTER (EMERGENCY)
Facility: HOSPITAL | Age: 63
Discharge: HOME | End: 2023-03-21
Attending: EMERGENCY MEDICINE
Payer: MEDICAID

## 2023-03-20 VITALS
TEMPERATURE: 98 F | HEART RATE: 84 BPM | DIASTOLIC BLOOD PRESSURE: 84 MMHG | RESPIRATION RATE: 15 BRPM | OXYGEN SATURATION: 93 % | SYSTOLIC BLOOD PRESSURE: 126 MMHG

## 2023-03-20 DIAGNOSIS — G40.919 BREAKTHROUGH SEIZURE: Primary | ICD-10-CM

## 2023-03-20 DIAGNOSIS — G40.909 SEIZURE DISORDER: Chronic | ICD-10-CM

## 2023-03-20 LAB
ALBUMIN SERPL-MCNC: 3.6 G/DL (ref 3.4–4.8)
ALBUMIN/GLOB SERPL: 0.9 RATIO (ref 1.1–2)
ALP SERPL-CCNC: 65 UNIT/L (ref 40–150)
ALT SERPL-CCNC: 11 UNIT/L (ref 0–55)
AST SERPL-CCNC: 27 UNIT/L (ref 5–34)
BASOPHILS # BLD AUTO: 0.03 X10(3)/MCL (ref 0–0.2)
BASOPHILS NFR BLD AUTO: 0.4 %
BILIRUBIN DIRECT+TOT PNL SERPL-MCNC: 0.3 MG/DL
BUN SERPL-MCNC: 20.6 MG/DL (ref 8.4–25.7)
CALCIUM SERPL-MCNC: 9.4 MG/DL (ref 8.8–10)
CHLORIDE SERPL-SCNC: 99 MMOL/L (ref 98–107)
CO2 SERPL-SCNC: 28 MMOL/L (ref 23–31)
CREAT SERPL-MCNC: 1 MG/DL (ref 0.73–1.18)
EOSINOPHIL # BLD AUTO: 0.2 X10(3)/MCL (ref 0–0.9)
EOSINOPHIL NFR BLD AUTO: 2.6 %
ERYTHROCYTE [DISTWIDTH] IN BLOOD BY AUTOMATED COUNT: 12.6 % (ref 11.5–17)
GFR SERPLBLD CREATININE-BSD FMLA CKD-EPI: >60 MLS/MIN/1.73/M2
GLOBULIN SER-MCNC: 4.1 GM/DL (ref 2.4–3.5)
GLUCOSE SERPL-MCNC: 95 MG/DL (ref 82–115)
HCT VFR BLD AUTO: 39.3 % (ref 42–52)
HGB BLD-MCNC: 12.2 G/DL (ref 14–18)
IMM GRANULOCYTES # BLD AUTO: 0.02 X10(3)/MCL (ref 0–0.04)
IMM GRANULOCYTES NFR BLD AUTO: 0.3 %
LYMPHOCYTES # BLD AUTO: 1.58 X10(3)/MCL (ref 0.6–4.6)
LYMPHOCYTES NFR BLD AUTO: 20.3 %
MCH RBC QN AUTO: 29.6 PG
MCHC RBC AUTO-ENTMCNC: 31 G/DL (ref 33–36)
MCV RBC AUTO: 95.4 FL (ref 80–94)
MONOCYTES # BLD AUTO: 0.67 X10(3)/MCL (ref 0.1–1.3)
MONOCYTES NFR BLD AUTO: 8.6 %
NEUTROPHILS # BLD AUTO: 5.29 X10(3)/MCL (ref 2.1–9.2)
NEUTROPHILS NFR BLD AUTO: 67.8 %
NRBC BLD AUTO-RTO: 0 %
PLATELET # BLD AUTO: 243 X10(3)/MCL (ref 130–400)
PMV BLD AUTO: 9.4 FL (ref 7.4–10.4)
POTASSIUM SERPL-SCNC: 5.4 MMOL/L (ref 3.5–5.1)
PROT SERPL-MCNC: 7.7 GM/DL (ref 5.8–7.6)
RBC # BLD AUTO: 4.12 X10(6)/MCL (ref 4.7–6.1)
SODIUM SERPL-SCNC: 138 MMOL/L (ref 136–145)
WBC # SPEC AUTO: 7.8 X10(3)/MCL (ref 4.5–11.5)

## 2023-03-20 PROCEDURE — 99284 EMERGENCY DEPT VISIT MOD MDM: CPT

## 2023-03-20 PROCEDURE — 85025 COMPLETE CBC W/AUTO DIFF WBC: CPT | Performed by: EMERGENCY MEDICINE

## 2023-03-20 PROCEDURE — 80053 COMPREHEN METABOLIC PANEL: CPT | Performed by: EMERGENCY MEDICINE

## 2023-03-20 RX ORDER — SODIUM CHLORIDE 9 MG/ML
1000 INJECTION, SOLUTION INTRAVENOUS
Status: COMPLETED | OUTPATIENT
Start: 2023-03-21 | End: 2023-03-21

## 2023-03-20 RX ORDER — LEVETIRACETAM 1000 MG/1
1000 TABLET ORAL 2 TIMES DAILY
Qty: 60 TABLET | Refills: 2 | Status: ON HOLD | OUTPATIENT
Start: 2023-03-20 | End: 2023-06-05 | Stop reason: SDUPTHER

## 2023-03-21 PROCEDURE — 99284 EMERGENCY DEPT VISIT MOD MDM: CPT

## 2023-03-21 PROCEDURE — 25000003 PHARM REV CODE 250: Performed by: EMERGENCY MEDICINE

## 2023-03-21 RX ADMIN — SODIUM CHLORIDE 1000 ML: 9 INJECTION, SOLUTION INTRAVENOUS at 12:03

## 2023-03-21 NOTE — ED PROVIDER NOTES
Encounter Date: 3/20/2023       History     Chief Complaint   Patient presents with    Seizures     62-year-old male with a history of seizures, asthma, COPD, brought from the [facility] to [facility] emergency room due to a breakthrough seizure. He states he is taking his medication as prescribed Keppra 750mg BID), given it at [facility] and was sleeping in his bed when they loaded him up and brought him to the ER.  He does not believe that he had a seizure.  He states he fells fine, eating well, no pain or physical complaints and wants to return to the [facility]. He denies any recent alcohol use      Review of patient's allergies indicates:   Allergen Reactions    Keflex [cephalexin]      Past Medical History:   Diagnosis Date    Asthma     COPD (chronic obstructive pulmonary disease)     Seizures      No past surgical history on file.  No family history on file.  Social History     Tobacco Use    Smoking status: Every Day     Packs/day: 0.50     Types: Cigarettes    Smokeless tobacco: Never   Substance Use Topics    Alcohol use: Yes     Alcohol/week: 2.0 standard drinks     Types: 2 Cans of beer per week     Comment: daily (pint of vodka per day)    Drug use: Yes     Types: Marijuana     Comment: daily     Review of Systems   Neurological:  Positive for seizures.   All other systems reviewed and are negative.    Physical Exam     Initial Vitals [03/20/23 2217]   BP Pulse Resp Temp SpO2   121/76 93 18 98.4 °F (36.9 °C) 97 %      MAP       --         Physical Exam    Nursing note and vitals reviewed.  Constitutional: Vital signs are normal. He appears well-developed and well-nourished.   HENT:   Head: Normocephalic and atraumatic.   Mouth/Throat: Oropharynx is clear and moist.   Eyes: EOM are normal. Pupils are equal, round, and reactive to light.   Neck: Neck supple.   Cardiovascular:  Normal rate, regular rhythm and normal heart sounds.           Pulmonary/Chest: Breath sounds normal. No  respiratory distress.   Abdominal: Abdomen is soft. There is no abdominal tenderness.   Musculoskeletal:         General: No edema.      Cervical back: Neck supple. No edema or erythema.     Neurological: He is alert and oriented to person, place, and time. GCS score is 15. GCS eye subscore is 4. GCS verbal subscore is 5. GCS motor subscore is 6.   Skin: Skin is warm and dry. Capillary refill takes less than 2 seconds.   Psychiatric: He has a normal mood and affect. Thought content normal.       ED Course   Procedures  Labs Reviewed   CBC WITH DIFFERENTIAL - Abnormal; Notable for the following components:       Result Value    RBC 4.12 (*)     Hgb 12.2 (*)     Hct 39.3 (*)     MCV 95.4 (*)     MCHC 31.0 (*)     All other components within normal limits   COMPREHENSIVE METABOLIC PANEL - Abnormal; Notable for the following components:    Potassium Level 5.4 (*)     Protein Total 7.7 (*)     Globulin 4.1 (*)     Albumin/Globulin Ratio 0.9 (*)     All other components within normal limits          Imaging Results    None          Medications   0.9%  NaCl infusion (1,000 mLs Intravenous New Bag 3/21/23 0000)     Medical Decision Making:   Initial Assessment:   62-year-old male with a history of seizures, asthma, COPD, brought from the [facility] to [facility] emergency room due to a breakthrough seizure. He states he is taking his medication as prescribed Keppra 750mg BID), given it at [facility] and was sleeping in his bed when they loaded him up and brought him to the ER.  He does not believe that he had a seizure.  He states he fells fine, eating well, no pain or physical complaints and wants to return to the [facility]. He denies any recent alcohol use    Differential Diagnosis:   Differential diagnosis includes but is not limited to break throught seizure, noncompliance, alcohol abuse  Clinical Tests:   Lab Tests: Reviewed       <> Summary of Lab:   Borderline elevated potassium at 5.4 most  likely hemolysis from drawing the blood through an IV, but he was given a L of saline which should treat this issue  ED Management:    Mr. Pierson was seen and evaluated in the emergency room with history, physical exam, lab work.  His exam is benign, lab work is benign.  I will increase his Keppra to 1000 mg twice daily.    I also have requested a follow-up appointment for him at [facility]. [facility] back.  Denies any alcohol use and does not appear to be intoxicated.  His neuro exam normal and he is stable for discharge.  All questions were answered.                        Clinical Impression:   Final diagnoses:  [G40.919] Breakthrough seizure (Primary)  [G40.909] Seizure disorder (Chronic)        ED Disposition Condition    Discharge Stable          ED Prescriptions       Medication Sig Dispense Start Date End Date Auth. Provider    levETIRAcetam (KEPPRA) 1000 MG tablet Take 1 tablet (1,000 mg total) by mouth 2 (two) times daily. 60 tablet 3/20/2023 3/19/2024 Mary Chavez MD          Follow-up Information       Follow up With Specialties Details Why Contact Info    [facility]  Schedule an appointment as soon as possible for a visit   [address]  [facility] [address]  [phone]               Mary Chavez MD  03/21/23 3189

## 2023-06-04 ENCOUNTER — HOSPITAL ENCOUNTER (INPATIENT)
Facility: HOSPITAL | Age: 63
LOS: 1 days | Discharge: HOME | DRG: 101 | End: 2023-06-05
Attending: EMERGENCY MEDICINE | Admitting: INTERNAL MEDICINE
Payer: MEDICAID

## 2023-06-04 DIAGNOSIS — R07.9 CHEST PAIN: ICD-10-CM

## 2023-06-04 DIAGNOSIS — R09.02 HYPOXIA: ICD-10-CM

## 2023-06-04 DIAGNOSIS — J69.0 ASPIRATION PNEUMONIA, UNSPECIFIED ASPIRATION PNEUMONIA TYPE, UNSPECIFIED LATERALITY, UNSPECIFIED PART OF LUNG: ICD-10-CM

## 2023-06-04 DIAGNOSIS — R56.9 SEIZURE: Primary | ICD-10-CM

## 2023-06-04 DIAGNOSIS — E87.20 METABOLIC ACIDOSIS: ICD-10-CM

## 2023-06-04 LAB
A-ADO2 BLOOD GAS (OHS): 490 MMHG
ALBUMIN SERPL-MCNC: 4 G/DL (ref 3.4–4.8)
ALBUMIN/GLOB SERPL: 1.4 RATIO (ref 1.1–2)
ALLENS TEST BLOOD GAS (OHS): YES
ALP SERPL-CCNC: 55 UNIT/L (ref 40–150)
ALT SERPL-CCNC: 8 UNIT/L (ref 0–55)
AMPHET UR QL SCN: NEGATIVE
APPEARANCE UR: CLEAR
AST SERPL-CCNC: 14 UNIT/L (ref 5–34)
BACTERIA #/AREA URNS AUTO: ABNORMAL /HPF
BARBITURATE SCN PRESENT UR: NEGATIVE
BASE EXCESS BLD CALC-SCNC: -14.7 MMOL/L (ref -2–2)
BASOPHILS # BLD AUTO: 0.03 X10(3)/MCL
BASOPHILS NFR BLD AUTO: 0.4 %
BENZODIAZ UR QL SCN: NEGATIVE
BILIRUB UR QL STRIP.AUTO: NEGATIVE MG/DL
BILIRUBIN DIRECT+TOT PNL SERPL-MCNC: 0.5 MG/DL
BLOOD GAS SAMPLE TYPE (OHS): ABNORMAL
BUN SERPL-MCNC: 13.9 MG/DL (ref 8.4–25.7)
CALCIUM SERPL-MCNC: 9.2 MG/DL (ref 8.8–10)
CANNABINOIDS UR QL SCN: NEGATIVE
CHLORIDE SERPL-SCNC: 106 MMOL/L (ref 98–107)
CHOLEST SERPL-MCNC: 145 MG/DL
CHOLEST/HDLC SERPL: 4 {RATIO} (ref 0–5)
CK SERPL-CCNC: 141 U/L (ref 30–200)
CO2 BLDA-SCNC: 12.1 MMOL/L (ref 22–26)
CO2 SERPL-SCNC: 26 MMOL/L (ref 23–31)
COCAINE UR QL SCN: NEGATIVE
COLOR UR: COLORLESS
CREAT SERPL-MCNC: 0.81 MG/DL (ref 0.73–1.18)
DRAWN BY BLOOD GAS (OHS): ABNORMAL
EOSINOPHIL # BLD AUTO: 0.03 X10(3)/MCL (ref 0–0.9)
EOSINOPHIL NFR BLD AUTO: 0.4 %
ERYTHROCYTE [DISTWIDTH] IN BLOOD BY AUTOMATED COUNT: 14.2 % (ref 11.5–17)
ETHANOL SERPL-MCNC: <10 MG/DL
FENTANYL UR QL SCN: NEGATIVE
FIO2 BLOOD GAS (OHS): 100 %
FLUAV AG UPPER RESP QL IA.RAPID: NOT DETECTED
FLUBV AG UPPER RESP QL IA.RAPID: NOT DETECTED
GFR SERPLBLD CREATININE-BSD FMLA CKD-EPI: >60 MLS/MIN/1.73/M2
GLOBULIN SER-MCNC: 2.8 GM/DL (ref 2.4–3.5)
GLUCOSE SERPL-MCNC: 87 MG/DL (ref 82–115)
GLUCOSE UR QL STRIP.AUTO: NORMAL MG/DL
HCO3 BLDA-SCNC: 11.3 MMOL/L (ref 22–26)
HCT VFR BLD AUTO: 41.5 % (ref 42–52)
HDLC SERPL-MCNC: 37 MG/DL (ref 35–60)
HGB BLD-MCNC: 13 G/DL (ref 14–18)
HYALINE CASTS #/AREA URNS LPF: ABNORMAL /LPF
IMM GRANULOCYTES # BLD AUTO: 0.03 X10(3)/MCL (ref 0–0.04)
IMM GRANULOCYTES NFR BLD AUTO: 0.4 %
KETONES UR QL STRIP.AUTO: NEGATIVE MG/DL
LACTATE SERPL-SCNC: 1.8 MMOL/L (ref 0.5–2.2)
LACTATE SERPL-SCNC: 3.5 MMOL/L (ref 0.5–2.2)
LDLC SERPL CALC-MCNC: 97 MG/DL (ref 50–140)
LEUKOCYTE ESTERASE UR QL STRIP.AUTO: NEGATIVE UNIT/L
LIPASE SERPL-CCNC: 16 U/L
LYMPHOCYTES # BLD AUTO: 0.83 X10(3)/MCL (ref 0.6–4.6)
LYMPHOCYTES NFR BLD AUTO: 11.3 %
MAGNESIUM SERPL-MCNC: 2.1 MG/DL (ref 1.6–2.6)
MCH RBC QN AUTO: 28.9 PG (ref 27–31)
MCHC RBC AUTO-ENTMCNC: 31.3 G/DL (ref 33–36)
MCV RBC AUTO: 92.2 FL (ref 80–94)
MDMA UR QL SCN: NEGATIVE
MONOCYTES # BLD AUTO: 0.43 X10(3)/MCL (ref 0.1–1.3)
MONOCYTES NFR BLD AUTO: 5.8 %
MUCOUS THREADS URNS QL MICRO: ABNORMAL /LPF
NEUTROPHILS # BLD AUTO: 6.02 X10(3)/MCL (ref 2.1–9.2)
NEUTROPHILS NFR BLD AUTO: 81.7 %
NITRITE UR QL STRIP.AUTO: NEGATIVE
NRBC BLD AUTO-RTO: 0 %
OPIATES UR QL SCN: NEGATIVE
OXYGEN DEVICE BLOOD GAS (OHS): ABNORMAL
OXYHGB MFR BLDA: 6.3 G/DL (ref 12–18)
PAO2 BLOOD GAS (OHS): 679 MMHG
PCO2 BLDA: 27 MMHG (ref 35–45)
PCP UR QL: NEGATIVE
PH BLDA: 7.23 [PH] (ref 7.35–7.45)
PH UR STRIP.AUTO: 7 [PH]
PH UR: 7 [PH] (ref 3–11)
PLATELET # BLD AUTO: 182 X10(3)/MCL (ref 130–400)
PMV BLD AUTO: 9.6 FL (ref 7.4–10.4)
PO2 BLDA: 189 MMHG (ref 75–100)
POTASSIUM SERPL-SCNC: 3.9 MMOL/L (ref 3.5–5.1)
PROT SERPL-MCNC: 6.8 GM/DL (ref 5.8–7.6)
PROT UR QL STRIP.AUTO: NEGATIVE MG/DL
RBC # BLD AUTO: 4.5 X10(6)/MCL (ref 4.7–6.1)
RBC #/AREA URNS AUTO: ABNORMAL /HPF
RBC UR QL AUTO: NEGATIVE UNIT/L
SALICYLATES SERPL-MCNC: <5 MG/DL
SAMPLE SITE BLOOD GAS (OHS): ABNORMAL
SARS-COV-2 RNA RESP QL NAA+PROBE: NOT DETECTED
SODIUM SERPL-SCNC: 142 MMOL/L (ref 136–145)
SP GR UR STRIP.AUTO: 1.01
SQUAMOUS #/AREA URNS LPF: ABNORMAL /HPF
TRIGL SERPL-MCNC: 53 MG/DL (ref 34–140)
TSH SERPL-ACNC: 1.11 UIU/ML (ref 0.35–4.94)
UROBILINOGEN UR STRIP-ACNC: NORMAL MG/DL
VLDLC SERPL CALC-MCNC: 11 MG/DL
WBC # SPEC AUTO: 7.37 X10(3)/MCL (ref 4.5–11.5)
WBC #/AREA URNS AUTO: ABNORMAL /HPF

## 2023-06-04 PROCEDURE — 21400001 HC TELEMETRY ROOM

## 2023-06-04 PROCEDURE — 81001 URINALYSIS AUTO W/SCOPE: CPT | Performed by: EMERGENCY MEDICINE

## 2023-06-04 PROCEDURE — 82550 ASSAY OF CK (CPK): CPT

## 2023-06-04 PROCEDURE — 80307 DRUG TEST PRSMV CHEM ANLYZR: CPT | Performed by: EMERGENCY MEDICINE

## 2023-06-04 PROCEDURE — 25000003 PHARM REV CODE 250

## 2023-06-04 PROCEDURE — 99291 CRITICAL CARE FIRST HOUR: CPT

## 2023-06-04 PROCEDURE — 99900035 HC TECH TIME PER 15 MIN (STAT)

## 2023-06-04 PROCEDURE — 83605 ASSAY OF LACTIC ACID: CPT | Performed by: EMERGENCY MEDICINE

## 2023-06-04 PROCEDURE — 83690 ASSAY OF LIPASE: CPT | Performed by: EMERGENCY MEDICINE

## 2023-06-04 PROCEDURE — 80179 DRUG ASSAY SALICYLATE: CPT

## 2023-06-04 PROCEDURE — 80053 COMPREHEN METABOLIC PANEL: CPT | Performed by: EMERGENCY MEDICINE

## 2023-06-04 PROCEDURE — 85025 COMPLETE CBC W/AUTO DIFF WBC: CPT | Performed by: EMERGENCY MEDICINE

## 2023-06-04 PROCEDURE — 80061 LIPID PANEL: CPT

## 2023-06-04 PROCEDURE — 63600175 PHARM REV CODE 636 W HCPCS: Performed by: EMERGENCY MEDICINE

## 2023-06-04 PROCEDURE — 63600175 PHARM REV CODE 636 W HCPCS

## 2023-06-04 PROCEDURE — 87040 BLOOD CULTURE FOR BACTERIA: CPT | Performed by: EMERGENCY MEDICINE

## 2023-06-04 PROCEDURE — 0240U COVID/FLU A&B PCR: CPT | Performed by: EMERGENCY MEDICINE

## 2023-06-04 PROCEDURE — 83735 ASSAY OF MAGNESIUM: CPT | Performed by: EMERGENCY MEDICINE

## 2023-06-04 PROCEDURE — 11000001 HC ACUTE MED/SURG PRIVATE ROOM

## 2023-06-04 PROCEDURE — 96365 THER/PROPH/DIAG IV INF INIT: CPT | Mod: 59

## 2023-06-04 PROCEDURE — 96375 TX/PRO/DX INJ NEW DRUG ADDON: CPT

## 2023-06-04 PROCEDURE — 96361 HYDRATE IV INFUSION ADD-ON: CPT

## 2023-06-04 PROCEDURE — 25000003 PHARM REV CODE 250: Performed by: EMERGENCY MEDICINE

## 2023-06-04 PROCEDURE — 82077 ASSAY SPEC XCP UR&BREATH IA: CPT

## 2023-06-04 PROCEDURE — 84443 ASSAY THYROID STIM HORMONE: CPT

## 2023-06-04 RX ORDER — LORAZEPAM 2 MG/ML
INJECTION INTRAMUSCULAR
Status: DISPENSED
Start: 2023-06-04 | End: 2023-06-04

## 2023-06-04 RX ORDER — LORAZEPAM 2 MG/ML
1 INJECTION INTRAMUSCULAR
Status: COMPLETED | OUTPATIENT
Start: 2023-06-04 | End: 2023-06-04

## 2023-06-04 RX ORDER — LEVETIRACETAM 500 MG/1
1000 TABLET ORAL 2 TIMES DAILY
Status: DISCONTINUED | OUTPATIENT
Start: 2023-06-04 | End: 2023-06-05 | Stop reason: HOSPADM

## 2023-06-04 RX ORDER — SODIUM CHLORIDE 0.9 % (FLUSH) 0.9 %
10 SYRINGE (ML) INJECTION
Status: DISCONTINUED | OUTPATIENT
Start: 2023-06-04 | End: 2023-06-05 | Stop reason: HOSPADM

## 2023-06-04 RX ORDER — LORAZEPAM 2 MG/ML
1 INJECTION INTRAMUSCULAR EVERY 5 MIN PRN
Status: DISCONTINUED | OUTPATIENT
Start: 2023-06-04 | End: 2023-06-05 | Stop reason: HOSPADM

## 2023-06-04 RX ORDER — TALC
6 POWDER (GRAM) TOPICAL NIGHTLY PRN
Status: DISCONTINUED | OUTPATIENT
Start: 2023-06-04 | End: 2023-06-05 | Stop reason: HOSPADM

## 2023-06-04 RX ORDER — ALBUTEROL SULFATE 90 UG/1
1 AEROSOL, METERED RESPIRATORY (INHALATION) EVERY 6 HOURS PRN
Status: DISCONTINUED | OUTPATIENT
Start: 2023-06-04 | End: 2023-06-05 | Stop reason: HOSPADM

## 2023-06-04 RX ORDER — ENOXAPARIN SODIUM 100 MG/ML
40 INJECTION SUBCUTANEOUS EVERY 24 HOURS
Status: DISCONTINUED | OUTPATIENT
Start: 2023-06-04 | End: 2023-06-05 | Stop reason: HOSPADM

## 2023-06-04 RX ADMIN — LORAZEPAM 1 MG: 2 INJECTION INTRAMUSCULAR; INTRAVENOUS at 06:06

## 2023-06-04 RX ADMIN — VANCOMYCIN HYDROCHLORIDE 1500 MG: 1 INJECTION, POWDER, LYOPHILIZED, FOR SOLUTION INTRAVENOUS at 07:06

## 2023-06-04 RX ADMIN — SODIUM CHLORIDE 1000 ML: 9 INJECTION, SOLUTION INTRAVENOUS at 06:06

## 2023-06-04 RX ADMIN — METOPROLOL SUCCINATE 12.5 MG: 25 TABLET, EXTENDED RELEASE ORAL at 11:06

## 2023-06-04 RX ADMIN — SODIUM CHLORIDE 1000 ML: 9 INJECTION, SOLUTION INTRAVENOUS at 05:06

## 2023-06-04 RX ADMIN — LEVETIRACETAM 1000 MG: 500 TABLET, FILM COATED ORAL at 09:06

## 2023-06-04 RX ADMIN — ENOXAPARIN SODIUM 40 MG: 40 INJECTION SUBCUTANEOUS at 05:06

## 2023-06-04 RX ADMIN — PIPERACILLIN AND TAZOBACTAM 4.5 G: 4; .5 INJECTION, POWDER, LYOPHILIZED, FOR SOLUTION INTRAVENOUS; PARENTERAL at 07:06

## 2023-06-04 RX ADMIN — LEVETIRACETAM 1000 MG: 500 TABLET, FILM COATED ORAL at 08:06

## 2023-06-04 NOTE — ED PROVIDER NOTES
Encounter Date: 6/4/2023       History     Chief Complaint   Patient presents with    Seizures     Pt had three seizures pta. Out of keppra and metoprolol x 1 week. Gcs 14.  Incont of urine.      3 seizures, known seizure disorder; out of keppra for 2 weeks; living in homeless shelter; arrives here mildly hypotensive, hypoxic, confused and post ictal appearing.      Seizures   This is a recurrent problem. The current episode started yesterday. The problem has been unchanged. There were 4 to 5 seizures. The most recent episode lasted Less than 30 seconds. Pertinent negatives include no sleepiness, no confusion, no headaches, no speech difficulty, no visual disturbance, no neck stiffness, no sore throat, no chest pain, no cough, no nausea, no vomiting, no diarrhea and no muscle weakness. Characteristics include eye blinking, eye deviation, bladder incontinence, rhythmic jerking and loss of consciousness. Characteristics do not include bowel incontinence, bit tongue, apnea or cyanosis. The episode was Witnessed. There was No sensation of an aura present. The seizure(s) had no focality. Possible causes include medication or dosage change, sleep deprivation, missed seizure meds and change in alcohol use. Possible causes do not include recent illness. There were no medications administered prior to arrival.   Review of patient's allergies indicates:   Allergen Reactions    Keflex [cephalexin]      Past Medical History:   Diagnosis Date    Asthma     COPD (chronic obstructive pulmonary disease)     Seizures      History reviewed. No pertinent surgical history.  History reviewed. No pertinent family history.  Social History     Tobacco Use    Smoking status: Every Day     Packs/day: 0.50     Types: Cigarettes    Smokeless tobacco: Never   Substance Use Topics    Alcohol use: Yes     Alcohol/week: 2.0 standard drinks     Types: 2 Cans of beer per week     Comment: daily (pint of vodka per day)    Drug use: Yes     Types:  Marijuana     Comment: daily     Review of Systems   HENT:  Negative for sore throat.    Eyes:  Negative for visual disturbance.   Respiratory:  Negative for apnea and cough.    Cardiovascular:  Negative for chest pain and cyanosis.   Gastrointestinal:  Negative for bowel incontinence, diarrhea, nausea and vomiting.   Genitourinary:  Positive for bladder incontinence.   Neurological:  Positive for seizures and loss of consciousness. Negative for speech difficulty and headaches.   Psychiatric/Behavioral:  Negative for confusion.    All other systems reviewed and are negative.    Physical Exam     Initial Vitals [06/04/23 0538]   BP Pulse Resp Temp SpO2   90/63 98 18 98.5 °F (36.9 °C) (!) 89 %      MAP       --         Physical Exam    Nursing note and vitals reviewed.  Constitutional: He appears well-developed. He is not diaphoretic. No distress.   HENT:   Head: Normocephalic and atraumatic.   Right Ear: External ear normal.   Left Ear: External ear normal.   Eyes: EOM are normal. Pupils are equal, round, and reactive to light. Right eye exhibits no discharge. Left eye exhibits no discharge. No scleral icterus.   Neck: Neck supple. No thyromegaly present. No tracheal deviation present. No JVD present.   Normal range of motion.  Cardiovascular:  Normal rate, regular rhythm, normal heart sounds and intact distal pulses.     Exam reveals no gallop and no friction rub.       No murmur heard.  Pulmonary/Chest: Breath sounds normal. No stridor. No respiratory distress. He has no wheezes. He has no rhonchi. He has no rales.   Abdominal: Abdomen is soft. Bowel sounds are normal. He exhibits no distension. There is no abdominal tenderness. There is no rebound and no guarding.   Musculoskeletal:         General: No tenderness or edema. Normal range of motion.      Cervical back: Normal range of motion and neck supple.     Neurological: He is alert. No cranial nerve deficit or sensory deficit.   Confused, compatible with post  ictal state; no focal abnormal findings   Skin: Skin is warm and dry. Capillary refill takes less than 2 seconds. No rash and no abscess noted. No erythema. No pallor.       ED Course   Critical Care    Date/Time: 6/4/2023 7:05 AM  Performed by: Hung Orosco MD  Authorized by: Hung Orosco MD   Direct patient critical care time: 35 minutes  Additional history critical care time: 5 minutes  Ordering / reviewing critical care time: 10 minutes  Documentation critical care time: 5 minutes  Consulting other physicians critical care time: 5 minutes  Consult with family critical care time: 5 minutes  Total critical care time (exclusive of procedural time) : 65 minutes  Critical care time was exclusive of separately billable procedures and treating other patients and teaching time.  Critical care was necessary to treat or prevent imminent or life-threatening deterioration of the following conditions: metabolic crisis, CNS failure or compromise and respiratory failure.  Critical care was time spent personally by me on the following activities: blood draw for specimens, development of treatment plan with patient or surrogate, discussions with consultants, interpretation of cardiac output measurements, evaluation of patient's response to treatment, examination of patient, obtaining history from patient or surrogate, ordering and performing treatments and interventions, ordering and review of laboratory studies, ordering and review of radiographic studies, pulse oximetry, re-evaluation of patient's condition and review of old charts.      Labs Reviewed   LACTIC ACID, PLASMA - Abnormal; Notable for the following components:       Result Value    Lactic Acid Level 3.5 (*)     All other components within normal limits   CBC WITH DIFFERENTIAL - Abnormal; Notable for the following components:    RBC 4.50 (*)     Hgb 13.0 (*)     Hct 41.5 (*)     MCHC 31.3 (*)     All other components within normal limits   BLOOD  GAS - Abnormal; Notable for the following components:    pH, Blood gas 7.230 (*)     pCO2, Blood gas 27.0 (*)     pO2, Blood gas 189.0 (*)     TOC2, Blood gas 12.1 (*)     Base Excess, Blood gas -14.70 (*)     HCO3, Blood gas 11.3 (*)     THb, Blood gas 6.3 (*)     All other components within normal limits   LIPASE - Normal   MAGNESIUM - Normal   COVID/FLU A&B PCR - Normal    Narrative:     The Xpert Xpress SARS-CoV-2/FLU/RSV plus is a rapid, multiplexed real-time PCR test intended for the simultaneous qualitative detection and differentiation of SARS-CoV-2, Influenza A, Influenza B, and respiratory syncytial virus (RSV) viral RNA in either nasopharyngeal swab or nasal swab specimens.         BLOOD CULTURE [facility]   BLOOD CULTURE [facility]   CBC W/ AUTO DIFFERENTIAL    Narrative:     The following orders were created for panel order CBC Auto Differential.  Procedure                               Abnormality         Status                     ---------                               -----------         ------                     CBC with Differential[[id number]]        Abnormal            Final result                 Please view results for these tests on the individual orders.   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, right MCA territory CVA; no acute abnormal ;   Medications   levETIRAcetam tablet 1,000 mg (has no administration in time range)   sodium chloride 0.9% bolus 1,000 mL 1,000 mL  (1,000 mLs Intravenous New Bag 6/4/23 0617)   sodium chloride 0.9% bolus 1,770 mL 1,770 mL (1,770 mLs Intravenous Not Given 6/4/23 0630)   piperacillin-tazobactam (ZOSYN) 4.5 g in sodium chloride 0.9 % 100 mL IVPB (MB+) (has no administration in time range)   vancomycin (VANCOCIN) 1,500 mg in sodium chloride 0.9% 250 mL IVPB (has no administration in time range)   LORazepam injection 1 mg (1 mg Intravenous Given 6/4/23 0600)   sodium chloride 0.9% bolus 1,000 mL 1,000 mL (0 mLs Intravenous Stopped 6/4/23 0616)   LORazepam injection 1 mg (1 mg Intravenous Given 6/4/23 0612)     Medical Decision Making:   History:   Old Medical Records: I decided to obtain old medical records.  Old Records Summarized: other records.       <> Summary of Records: Records demonstrate history distant CVA, known chronic seizure disorder, history significant problems with medical compliance, poor psychosocial supports.  Initial Assessment:   Presents with reported seizures x3, initially hypoxic and mildly hypotensive; patient does have recurrent seizure in the ED; he is improved with initial interventions including keppra, benzo, oxygen, crystalloid. Working diagnoses include aspiration pneumonia for which he receives appropriate diagnostic and therapeutics for sepsis.  Differential Diagnosis:   Keppra non compliance, alcohol withdrawal, recurent seizures, new cva, no intracranial bleeding diathesis, profound metabolic derangement, sepsis of unknown source  Independently Interpreted Test(s):   I have ordered and independently interpreted X-rays - see prior notes.  I have ordered and independently interpreted EKG Reading(s) - see prior notes  Clinical Tests:   Lab Tests: Ordered and Reviewed  Radiological Study: Ordered and Reviewed  Medical Tests: Ordered and Reviewed  ED Management:  Patient is improved with initial temporizing efforts in the ED. Labs demonstrate significant metabolic acidosis (lactate) likely seconday to the seizure  activity that is witnessed (he does have winter's appropriate respiratory alkalosis, modest net acidemia). Oxygenation can be corrected with FiO2 (not requiring positive pressure required. Cxr is non focal but aspiration still appears at least probable. Ct head demonstrates changes compatible with large right MCA territory CVA. Patient requiring admission for further evaluation and treatment.   Other:   I have discussed this case with another health care provider.       <> Summary of the Discussion: Medicine team aware of patient, plan admit for further evaluation and management.            ED Course as of 06/04/23 0715   Keller Jun 04, 2023   0608 Reassuring hemogram ; [CT]   0631 Normal lipase ; [CT]   0631 Reassuring chemistries ; [CT]   0640 Modestly elevated lactate ; [CT]   0641 Blood gases demonstrate metabolic acidosis with winter's appropriate respiratory alk, net acidemia is moderate ; [CT]   0708 Respiratory pathogens negative by pcr ; [CT]      ED Course User Index  [CT] Hung Orosco MD                   Clinical Impression:   Final diagnoses:  [R07.9] Chest pain  [R56.9] Seizure (Primary)  [J69.0] Aspiration pneumonia, unspecified aspiration pneumonia type, unspecified laterality, unspecified part of lung  [R09.02] Hypoxia  [E87.20] Metabolic acidosis        ED Disposition Condition    Admit Stable                Hung Orosco MD  06/04/23 0715

## 2023-06-04 NOTE — H&P
"[facility]ards   History & Physical Note     Resident Team: [facility] Medicine List 3  Attending Physician: Meghan Garvin MD  Resident: Dusty Corbin  Intern: Chelita Field     Date of Admit: 6/4/2023    Chief Complaint:     Seizures (Pt had three seizures pta. Out of keppra and metoprolol x 1 week. Gcs 14.  Incont of urine. )       Subjective:      History of Present Illness:  Pierre Pierson is a 63 y.o. male with a history of seizures, HFrEF, polysubstance abuse who presented to [facility] ED on 6/4/2023  with complaint of seizures. Patient reported seizure episode this morning While in the ED, patient noted to have 2 additional episodes of witnessed seizures. ED describes seizures as rhythmic jerking, bladder incontinence, and loss of consciousness. After the episode, patient had some confusion possibly due to post-ictal state. On evaluation, patient answers questions appropriately. When questioned about his seizures, he cannot give details of what goes on during his seizures. He is unable to report if he has any auras that precipitate his seizures. He reports running out of Clarassance for "at least the last month". He says he currently resides at [facility]. Patient reports previous history of heavy alcohol use and describes himself as a "swimmer". However, patient denies any alcohol use within the past month.     In the ED, patient temperature was 98.5, HR 98, RR 18, BP 90/63, and saturating 89% on room air. CBC was unremarkable with WBC 7.37 and H/H 13/41.5. CMP had sodium 142, potassium 3.9, bicarb 26, glucose 87, BUN/Cr 13.9/0.81. Magnesium was 2.1. Lactate wax elevated 3.5. UA and UDS pending. Internal medicine was consulted for admission.    Past Medical History:   has a past medical history of Asthma, COPD (chronic obstructive pulmonary disease), and Seizures.     Past Surgical History:   has no past surgical history on file.     Family History:  family history is not on file.     Social History:   reports that he " Yudelka has been smoking cigarettes. He has been smoking an average of .5 packs per day. He has never used smokeless tobacco. He reports current alcohol use of about 2.0 standard drinks per week. He reports current drug use. Drug: Marijuana.     Allergies:  is allergic to keflex [cephalexin].     Home Medications:  Prior to Admission medications    Medication Sig Start Date End Date Taking? Authorizing Provider   albuterol (PROVENTIL/VENTOLIN HFA) 90 mcg/actuation inhaler Inhale 1-2 puffs into the lungs every 6 (six) hours as needed for Wheezing or Shortness of Breath. Rescue 1/5/23 1/5/24  Delbert Fitzgerald MD   levETIRAcetam (KEPPRA) 1000 MG tablet Take 1 tablet (1,000 mg total) by mouth 2 (two) times daily. 3/20/23 3/19/24  Mary Chavez MD   metoprolol succinate (TOPROL-XL) 25 MG 24 hr tablet Take 0.5 tablets (12.5 mg total) by mouth once daily. 2/1/23 5/2/23  Miguel Magdaleno MD       Review of Systems:  Review of Systems   Constitutional:  Negative for chills and fever.   HENT:  Negative for congestion, sinus pain and sore throat.    Eyes:  Negative for blurred vision and double vision.   Respiratory:  Negative for cough, sputum production, shortness of breath and wheezing.    Cardiovascular:  Negative for chest pain, palpitations and leg swelling.   Gastrointestinal:  Negative for abdominal pain, nausea and vomiting.   Genitourinary:  Negative for dysuria.   Musculoskeletal:  Negative for myalgias.   Neurological:  Positive for seizures and weakness. Negative for dizziness and focal weakness.      Objective:     Vital Signs (Most Recent):  Temp: 98.4 °F (36.9 °C) (06/04/23 0956)  Pulse: 80 (06/04/23 0956)  Resp: 17 (06/04/23 0828)  BP: 101/74 (06/04/23 0956)  SpO2: 100 % (06/04/23 0956) Vital Signs (24h Range):  Temp:  [98.4 °F (36.9 °C)-98.5 °F (36.9 °C)] 98.4 °F (36.9 °C)  Pulse:  [] 80  Resp:  [16-22] 17  SpO2:  [89 %-100 %] 100 %  BP: ()/(63-78) 101/74     Physical Examination:  Physical  Exam  HENT:      Head: Normocephalic and atraumatic.      Nose: Nose normal.      Mouth/Throat:      Mouth: Mucous membranes are moist.      Pharynx: Oropharynx is clear.   Eyes:      Conjunctiva/sclera: Conjunctivae normal.      Pupils: Pupils are equal, round, and reactive to light.   Cardiovascular:      Rate and Rhythm: Normal rate and regular rhythm.      Pulses: Normal pulses.      Heart sounds: No murmur heard.  Pulmonary:      Effort: Pulmonary effort is normal. No respiratory distress.      Breath sounds: No wheezing.   Chest:      Chest wall: No tenderness.   Abdominal:      General: Abdomen is flat. Bowel sounds are normal. There is no distension.      Palpations: Abdomen is soft.      Tenderness: There is no abdominal tenderness.   Musculoskeletal:         General: No swelling. Normal range of motion.      Cervical back: Normal range of motion.   Skin:     General: Skin is warm.   Neurological:      General: No focal deficit present.      Mental Status: He is alert.      Comments: Full strength in bilateral upper and lower extremities 5/5 though slightly weak after receiving Ativan; answers questions appropriately though unable to recall occurences of his seizures        Laboratory:  Most Recent Data:  CBC:   Recent Labs   Lab 06/04/23  0551   WBC 7.37   HGB 13.0*   HCT 41.5*        CMP:   Recent Labs   Lab 06/04/23  0551   CALCIUM 9.2   ALBUMIN 4.0      K 3.9   CO2 26   BUN 13.9   CREATININE 0.81   ALKPHOS 55   ALT 8   AST 14   BILITOT 0.5         Microbiology Data:  Blood cultures pending      Radiology:  Imaging Results              CT Head Without Contrast (Final result)  Result time 06/04/23 08:02:02      Final result by Myles Gil MD (06/04/23 08:02:02)                   Impression:    Impression:    1. No acute intracranial process identified. Details and other findings as noted above.      Electronically signed by: Myles Gil  Date:    06/04/2023  Time:    08:02                Narrative:    EXAMINATION:  CT HEAD WITHOUT CONTRAST    CLINICAL HISTORY:  Seizure, new-onset, no history of trauma;    TECHNIQUE:  Low dose axial images were obtained through the head.  Coronal and sagittal reformations were also performed. Contrast was not administered.    Automatic exposure control was utilized to reduce the patient's radiation dose.    DLP= 917    COMPARISON:  12/29/2022    FINDINGS:  Hemorrhage: No acute intracranial hemorrhage is seen.    CSF spaces: The ventricles, sulci and basal cisterns all appear moderately prominent consistent with global cerebral atrophy.    Brain parenchyma: There is preservation of the grey white junction throughout. No acute infarct is identified. There is stable encephalomalacia seen in the right temporal region.    Cerebellum: Unremarkable.    Vascular: Unremarkable venous sinuses.    Sella and skull base: The sella appears to be within normal limits for age.    Cerebellopontine angles: Within normal limits.    Herniation: None.    Intracranial calcifications: Incidental note is made of bilateral choroid plexus calcification. Incidental note is made of some pineal region calcification.    Calvarium: No acute linear or depressed skull fracture is seen.    Maxillofacial Structures:    Paranasal sinuses: The visualized paranasal sinuses appear clear with no mucoperiosteal thickening or air fluid levels identified.    Orbits: The orbits appear unremarkable.    Zygomatic arches: The zygomatic arches are intact and unremarkable.    Temporal bones and mastoids: The temporal bones and mastoids appear unremarkable.    TMJ: The mandibular condyles appear normally placed with respect to the mandibular fossa.                        Preliminary result by Myles Gil MD (06/04/23 06:44:32)                   Narrative:    START OF REPORT:  Technique: CT of the head was performed without intravenous contrast with axial as well as coronal and sagittal  images.    Comparison: Comparison is with study dated â2022-12-29 22:31:17â.    Dosage Information: Automated Exposure Control was utilized 916.84 mGy.cm.    Clinical history: Seizure hx of seizures no trauma.    Findings:  Hemorrhage: No acute intracranial hemorrhage is seen.  CSF spaces: The ventricles, sulci and basal cisterns all appear moderately prominent consistent with global cerebral atrophy.  Brain parenchyma: There is preservation of the grey white junction throughout. No acute infarct is identified. There is stable encephalomalacia seen in the right temporal region.  Cerebellum: Unremarkable.  Vascular: Unremarkable venous sinuses.  Sella and skull base: The sella appears to be within normal limits for age.  Cerebellopontine angles: Within normal limits.  Herniation: None.  Intracranial calcifications: Incidental note is made of bilateral choroid plexus calcification. Incidental note is made of some pineal region calcification.  Calvarium: No acute linear or depressed skull fracture is seen.    Maxillofacial Structures:  Paranasal sinuses: The visualized paranasal sinuses appear clear with no mucoperiosteal thickening or air fluid levels identified.  Orbits: The orbits appear unremarkable.  Zygomatic arches: The zygomatic arches are intact and unremarkable.  Temporal bones and mastoids: The temporal bones and mastoids appear unremarkable.  TMJ: The mandibular condyles appear normally placed with respect to the mandibular fossa.      Impression:  1. No acute intracranial process identified. Details and other findings as noted above.                                         X-Ray Chest AP Portable (Final result)  Result time 06/04/23 08:10:33      Final result by Myles Gil MD (06/04/23 08:10:33)                   Impression:      No acute cardiopulmonary process.      Electronically signed by: Myles Gil  Date:    06/04/2023  Time:    08:10               Narrative:    EXAMINATION:  XR CHEST  AP PORTABLE    CLINICAL HISTORY:  Chest pain, unspecified    TECHNIQUE:  Single view of the chest    COMPARISON:  02/05/2023    FINDINGS:  No focal opacification, pleural effusion, or pneumothorax.    The cardiomediastinal silhouette is within normal limits.    No acute osseous abnormality.                                       Lines/Drains/Airways       Peripheral Intravenous Line  Duration                  Peripheral IV - Single Lumen 06/04/23 0550 20 G Anterior;Right Wrist <1 day         Peripheral IV - Single Lumen 06/04/23 0614 18 G Anterior;Distal;Left Wrist <1 day         Peripheral IV - Single Lumen 06/04/23 0615 18 G Anterior;Right Upper Arm <1 day                     Assessment & Plan:     Seizures  - Hx of seizures in past; witnessed seizure 2x in the ED  - Supposed to be on Keppra 1000 mg BID at home  - Reports not taking Keppra for over a month  - Unable to describe seizure episode; does not describe preceding aura episode though patient unable to appropriately answer questions due to post-ictal state  - ED reports patient with rhythmic jerking, eye blinking, bladder incontinence, and loss of consciousness  - Patient given Keppra and 2 doses of Ativan in the ED for seizures  - CXR negative for any acute cardiopulmonary process  - CT head negative for acute intracranial process  - TSH, salicylate, UDS ordered  - Continue Keppra while admitted  - PRN Ativan in place for breakthrough seizures    Lactic acidosis  - 1/4 SIRS; tachycardic 98 on presentation   - Lactic acid elevated on presentation 3.5  - Given 2L NS bolus in the ED  - Blood cultures 2x in the ED  - Given dose of Vancomycin and Zosyn in the ED  - Repeat lactic acid downtrended 1.8  - CXR showed no acute cardiopulmonary abnormalities  - UA was unremarkable in the ED  - ABG in the ED showed pH 7.23 / pCO2 27 / pO2 189  - Low suspicion sepsis at this time; with no source of infection and improving labs, will hold off on starting antibiotics at  this time    Alcohol abuse  - Reports previous heavy alcohol use  - Ethanol level pending  - Start on CIWA protocol while admitted  - PRN Ativan in place    HFrEF  - Previous Echo in 1/2023 showed EF 35%  - Stress test that same admission showed fixed perfusion abnormalities in LCX and RCA territories   - Cardiology recommended continuing GDMT at that time with Metoprolol succinate 12.5 mg daily  - Cardiology recommend ischemic workup in future with close follow-up in there clinic   - No appointment found in chart      CODE STATUS: Full Code  Access: Peripheral  Antibiotics: N/A  Diet: NPO  DVT Prophylaxis: Lovenox  GI Prophylaxis: none needed    Disposition: day 0 of admission for seizure workup. Patient also has positive stress in January. Will need follow up with Cardiology for ischemic workup. Dispo pending.    Sukumar Field MD  [facility], [facility]

## 2023-06-05 VITALS
SYSTOLIC BLOOD PRESSURE: 112 MMHG | DIASTOLIC BLOOD PRESSURE: 79 MMHG | BODY MASS INDEX: 22.66 KG/M2 | WEIGHT: 149.5 LBS | OXYGEN SATURATION: 93 % | HEIGHT: 68 IN | TEMPERATURE: 98 F | RESPIRATION RATE: 20 BRPM | HEART RATE: 74 BPM

## 2023-06-05 LAB
ALBUMIN SERPL-MCNC: 3.5 G/DL (ref 3.4–4.8)
ALBUMIN/GLOB SERPL: 1.4 RATIO (ref 1.1–2)
ALP SERPL-CCNC: 48 UNIT/L (ref 40–150)
ALT SERPL-CCNC: 8 UNIT/L (ref 0–55)
AST SERPL-CCNC: 21 UNIT/L (ref 5–34)
BASOPHILS # BLD AUTO: 0.04 X10(3)/MCL
BASOPHILS NFR BLD AUTO: 0.6 %
BILIRUBIN DIRECT+TOT PNL SERPL-MCNC: 0.8 MG/DL
BUN SERPL-MCNC: 14 MG/DL (ref 8.4–25.7)
CALCIUM SERPL-MCNC: 8.7 MG/DL (ref 8.8–10)
CHLORIDE SERPL-SCNC: 107 MMOL/L (ref 98–107)
CO2 SERPL-SCNC: 28 MMOL/L (ref 23–31)
CREAT SERPL-MCNC: 0.87 MG/DL (ref 0.73–1.18)
EOSINOPHIL # BLD AUTO: 0.11 X10(3)/MCL (ref 0–0.9)
EOSINOPHIL NFR BLD AUTO: 1.5 %
ERYTHROCYTE [DISTWIDTH] IN BLOOD BY AUTOMATED COUNT: 14.3 % (ref 11.5–17)
GFR SERPLBLD CREATININE-BSD FMLA CKD-EPI: >60 MLS/MIN/1.73/M2
GLOBULIN SER-MCNC: 2.5 GM/DL (ref 2.4–3.5)
GLUCOSE SERPL-MCNC: 87 MG/DL (ref 82–115)
HCT VFR BLD AUTO: 37.7 % (ref 42–52)
HGB BLD-MCNC: 11.9 G/DL (ref 14–18)
IMM GRANULOCYTES # BLD AUTO: 0.01 X10(3)/MCL (ref 0–0.04)
IMM GRANULOCYTES NFR BLD AUTO: 0.1 %
LYMPHOCYTES # BLD AUTO: 1.51 X10(3)/MCL (ref 0.6–4.6)
LYMPHOCYTES NFR BLD AUTO: 21 %
MCH RBC QN AUTO: 28.8 PG (ref 27–31)
MCHC RBC AUTO-ENTMCNC: 31.6 G/DL (ref 33–36)
MCV RBC AUTO: 91.3 FL (ref 80–94)
MONOCYTES # BLD AUTO: 0.6 X10(3)/MCL (ref 0.1–1.3)
MONOCYTES NFR BLD AUTO: 8.3 %
NEUTROPHILS # BLD AUTO: 4.92 X10(3)/MCL (ref 2.1–9.2)
NEUTROPHILS NFR BLD AUTO: 68.5 %
NRBC BLD AUTO-RTO: 0 %
PLATELET # BLD AUTO: 190 X10(3)/MCL (ref 130–400)
PMV BLD AUTO: 9.7 FL (ref 7.4–10.4)
POTASSIUM SERPL-SCNC: 4 MMOL/L (ref 3.5–5.1)
PROT SERPL-MCNC: 6 GM/DL (ref 5.8–7.6)
RBC # BLD AUTO: 4.13 X10(6)/MCL (ref 4.7–6.1)
SODIUM SERPL-SCNC: 141 MMOL/L (ref 136–145)
WBC # SPEC AUTO: 7.19 X10(3)/MCL (ref 4.5–11.5)

## 2023-06-05 PROCEDURE — 25000242 PHARM REV CODE 250 ALT 637 W/ HCPCS

## 2023-06-05 PROCEDURE — 25000003 PHARM REV CODE 250

## 2023-06-05 PROCEDURE — 94761 N-INVAS EAR/PLS OXIMETRY MLT: CPT

## 2023-06-05 PROCEDURE — 94640 AIRWAY INHALATION TREATMENT: CPT

## 2023-06-05 PROCEDURE — 27000221 HC OXYGEN, UP TO 24 HOURS

## 2023-06-05 PROCEDURE — 85025 COMPLETE CBC W/AUTO DIFF WBC: CPT

## 2023-06-05 PROCEDURE — 27100098 HC SPACER

## 2023-06-05 PROCEDURE — 25000003 PHARM REV CODE 250: Performed by: EMERGENCY MEDICINE

## 2023-06-05 PROCEDURE — 80053 COMPREHEN METABOLIC PANEL: CPT

## 2023-06-05 RX ORDER — LEVETIRACETAM 1000 MG/1
1000 TABLET ORAL 2 TIMES DAILY
Qty: 60 TABLET | Refills: 11 | Status: SHIPPED | OUTPATIENT
Start: 2023-06-05 | End: 2023-10-02 | Stop reason: SDUPTHER

## 2023-06-05 RX ORDER — LEVETIRACETAM 1000 MG/1
1000 TABLET ORAL 2 TIMES DAILY
Qty: 60 TABLET | Refills: 11 | Status: SHIPPED | OUTPATIENT
Start: 2023-06-05 | End: 2023-06-05 | Stop reason: SDUPTHER

## 2023-06-05 RX ADMIN — LEVETIRACETAM 1000 MG: 500 TABLET, FILM COATED ORAL at 09:06

## 2023-06-05 RX ADMIN — ALBUTEROL SULFATE 1 PUFF: 90 AEROSOL, METERED RESPIRATORY (INHALATION) at 01:06

## 2023-06-05 RX ADMIN — ALBUTEROL SULFATE 1 PUFF: 90 AEROSOL, METERED RESPIRATORY (INHALATION) at 07:06

## 2023-06-05 RX ADMIN — METOPROLOL SUCCINATE 12.5 MG: 25 TABLET, EXTENDED RELEASE ORAL at 09:06

## 2023-06-05 NOTE — DISCHARGE SUMMARY
"[facility] Medicine Discharge Summary    Admitting Physician: Meghan Garvin MD  Attending Physician: Meghan Garvin MD  Date of Admit: 6/4/2023  Date of Discharge: 6/5/2023    Condition: Good  Outcome: Patient tolerated treatment/procedure well without complication and is now ready for discharge.  DISPOSITION: Home or Self Care          Discharge Diagnoses     Patient Active Problem List   Diagnosis    Acute exacerbation of chronic obstructive pulmonary disease (COPD)    Asthma    Seizure disorder    Tobacco abuse    PAF (paroxysmal atrial fibrillation)    Hypotension    Hypotension due to drugs    ETOH abuse    Chronic combined systolic and diastolic heart failure    Syncope and collapse    Anemia       Principal Problem:  Seizure disorder    Consultants and Procedures     Consultants:  IP CONSULT TO HOSPITAL MEDICINE  IP CONSULT TO HOSPITAL MEDICINE  IP CONSULT TO REGISTERED DIETITIAN/NUTRITIONIST    Procedures:   * No surgery found *     Brief Admission History      Pierre Pierson is a 63 y.o. male with a history of seizures, HFrEF, polysubstance abuse who presented to [facility] ED on 6/4/2023  with complaint of seizures. Patient reported seizure episode this morning While in the ED, patient noted to have 2 additional episodes of witnessed seizures. ED describes seizures as rhythmic jerking, bladder incontinence, and loss of consciousness. After the episode, patient had some confusion possibly due to post-ictal state. On evaluation, patient answers questions appropriately. When questioned about his seizures, he cannot give details of what goes on during his seizures. He is unable to report if he has any auras that precipitate his seizures. He reports running out of Fitzeal for "at least the last month". He says he currently resides at [facility]. Patient reports previous history of heavy alcohol use and describes himself as a "swimmer". However, patient denies any alcohol use within the past month.      In the ED, " "patient temperature was 98.5, HR 98, RR 18, BP 90/63, and saturating 89% on room air. CBC was unremarkable with WBC 7.37 and H/H 13/41.5. CMP had sodium 142, potassium 3.9, bicarb 26, glucose 87, BUN/Cr 13.9/0.81. Magnesium was 2.1. Lactate wax elevated 3.5. UA and UDS pending. Internal medicine was consulted for admission.    Hospital Course with Pertinent Findings     Patient was restarted on home Keppra and tolerated medication well. He did not have any seizures while admitted on medications. To rule out other causes of seizures, TSH, salicylate, alcohol levels, and UDS were ordered which were negative. CT head was also obtained which came back negative. Patient also reports being abstinent from alcohol for the past month so lower suspicion for alcohol withdrawal. At this time, patient remains stable. Suspect patient seizures were due to medication non-compliance which he says he had stopped taking for the past month due to running out of medications. Will discharge patient today with Keppra refill. Will have patient follow up with Internal Medicine post-wards clinic to establish care and to help with medication refills in the future.    Discharge physical exam:  Vitals  BP: 93/62  Temp: 98.7 °F (37.1 °C)  Temp Source: Oral  Pulse: 74  Resp: 18  SpO2: (!) 91 %  Height: 5' 7.99" (172.7 cm)  Weight: 67.8 kg (149 lb 7.6 oz)    Physical Exam  Constitutional:       Appearance: Normal appearance.   HENT:      Head: Normocephalic and atraumatic.      Nose: Nose normal.      Mouth/Throat:      Mouth: Mucous membranes are moist.      Pharynx: Oropharynx is clear.   Eyes:      Conjunctiva/sclera: Conjunctivae normal.      Pupils: Pupils are equal, round, and reactive to light.   Cardiovascular:      Rate and Rhythm: Normal rate and regular rhythm.      Pulses: Normal pulses.      Heart sounds: No murmur heard.  Pulmonary:      Effort: Pulmonary effort is normal. No respiratory distress.      Breath sounds: No wheezing. "   Chest:      Chest wall: No tenderness.   Abdominal:      General: Abdomen is flat. Bowel sounds are normal. There is no distension.      Palpations: Abdomen is soft.      Tenderness: There is no abdominal tenderness.   Musculoskeletal:         General: No swelling. Normal range of motion.      Cervical back: Normal range of motion.   Skin:     General: Skin is warm.   Neurological:      General: No focal deficit present.      Mental Status: He is alert and oriented to person, place, and time.        TIME SPENT ON DISCHARGE: 60 minutes    Discharge Medications        Medication List        CONTINUE taking these medications      albuterol 90 mcg/actuation inhaler  Commonly known as: PROVENTIL/VENTOLIN HFA  Inhale 1-2 puffs into the lungs every 6 (six) hours as needed for Wheezing or Shortness of Breath. Rescue     levETIRAcetam 1000 MG tablet  Commonly known as: KEPPRA  Take 1 tablet (1,000 mg total) by mouth 2 (two) times daily.     metoprolol succinate 25 MG 24 hr tablet  Commonly known as: TOPROL-XL  Take 0.5 tablets (12.5 mg total) by mouth once daily.               Where to Get Your Medications        You can get these medications from any pharmacy    Bring a paper prescription for each of these medications  levETIRAcetam 1000 MG tablet         Discharge Information:      Follow-up Information       [facility] - Emergency Dept Follow up.    Specialty: Emergency Medicine  Why: As needed, If symptoms worsen  Contact information:  [address] [facility] [address] [phone]             [facility] INTERNAL MEDICINE Follow up.                             - Discharged with prescription of Keppra   - Follow with Internal Medicine post-wards clinic for establishment of care and to help with medication refills      Sukumar Field MD  [facility] [place] [facility], -1

## 2023-06-05 NOTE — PLAN OF CARE
Problem: Adult Inpatient Plan of Care  Goal: Plan of Care Review  Outcome: Met     Problem: Fall Injury Risk  Goal: Absence of Fall and Fall-Related Injury  Outcome: Met

## 2023-06-05 NOTE — PLAN OF CARE
Problem: Adult Inpatient Plan of Care  Goal: Plan of Care Review  Outcome: Ongoing, Progressing  Goal: Patient-Specific Goal (Individualized)  Outcome: Ongoing, Progressing  Goal: Absence of Hospital-Acquired Illness or Injury  Outcome: Ongoing, Progressing  Goal: Optimal Comfort and Wellbeing  Outcome: Ongoing, Progressing  Goal: Readiness for Transition of Care  Outcome: Ongoing, Progressing     Problem: Fall Injury Risk  Goal: Absence of Fall and Fall-Related Injury  Outcome: Ongoing, Progressing     Problem: Alcohol Withdrawal  Goal: Alcohol Withdrawal Symptom Control  Outcome: Ongoing, Progressing     Problem: Acute Neurologic Deterioration (Alcohol Withdrawal)  Goal: Optimal Neurologic Function  Outcome: Ongoing, Progressing     Problem: Substance Misuse (Alcohol Withdrawal)  Goal: Readiness for Change Identified  Outcome: Ongoing, Progressing

## 2023-06-05 NOTE — PROGRESS NOTES
"Inpatient Nutrition Evaluation    Admit Date: 2023   Total duration of encounter: 1 day    Nutrition Recommendation/Prescription     Heart Healthy diet  Suggest Daily MVI  Monitor Weights Weekly     Nutrition Assessment     Chart Review    Reason Seen: continuous nutrition monitoring    Malnutrition Screening Tool Results   Have you recently lost weight without trying?: Unsure  Have you been eating poorly because of a decreased appetite?: No   MST Score: 2     Diagnosis:  Seizures, Lactic acidosis, ETOH abuse, HFrEF    Relevant Medical History: Seizures, HFrEF, Polysubstance abuse, COPD    Nutrition-Related Medications: no nutrition related    Nutrition-Related Labs:  23 -- Glu 87, K 4, BUN 14, Cr 0.87    Diet Order: Diet heart healthy  Oral Supplement Order: none  Appetite/Oral Intake: good/% of meals  Factors Affecting Nutritional Intake: excessive alcohol intake  Food/Christianity/Cultural Preferences: none reported  Food Allergies: none reported       Wound(s):   none reported    Comments    23 -- Pt reports good appetite, 100% meal intake documented per EMR; denies difficulty eating, no n/v; LBM 6.4; unsure of UBW stating it varies & no wt loss "not that I noticed"; new wt obtained via bed scale reflective of previous wts per EMR wt hx, will continue to monitor; pt with h/o ETOH abuse -- suggest daily MVI    Anthropometrics    Height: 5' 7.99" (172.7 cm) Height Method: Estimated  Last Weight: 67.8 kg (149 lb 7.6 oz) (23 1235) Weight Method: Bed Scale  BMI (Calculated): 22.7  BMI Classification: normal (BMI 18.5-24.9)        Ideal Body Weight (IBW), Male: 153.94 lb     % Ideal Body Weight, Male (lb): 84.49 %                 Usual Body Weight (UBW), k kg  % Usual Body Weight: 101.41  % Weight Change From Usual Weight: 1.19 %  Usual Weight Provided By: EMR weight history    Wt Readings from Last 5 Encounters:   23 67.8 kg (149 lb 7.6 oz)   23 66.7 kg (147 lb 0.8 oz)   23 " 64 kg (141 lb 1.5 oz)   02/01/23 66.7 kg (147 lb 0.8 oz)   01/28/23 66.9 kg (147 lb)   12/29/22 63.5 kg    Weight Change(s) Since Admission:  Admit Weight: 59 kg (130 lb 1.1 oz) (06/04/23 0538)  6/5/23 -- 67.8 kg, bed wt    Patient Education    Not applicable.    Monitoring & Evaluation     Dietitian will monitor food and beverage intake and weight change.  Nutrition Risk/Follow-Up: low (follow-up in 5-7 days)  Patients assigned 'low nutrition risk' status do not qualify for a full nutritional assessment but will be monitored and re-evaluated in a 5-7 day time period. Please consult if re-evaluation needed sooner.

## 2023-06-07 ENCOUNTER — PATIENT OUTREACH (OUTPATIENT)
Dept: ADMINISTRATIVE | Facility: CLINIC | Age: 63
End: 2023-06-07
Payer: MEDICAID

## 2023-06-07 NOTE — PROGRESS NOTES
C3 nurse spoke with Pierre Pierson  for a TCC post hospital discharge follow up call. The patient has a scheduled HOSFU appointment with [facility] on 06/26/2023 @ 1 pm.

## 2023-06-09 LAB
BACTERIA BLD CULT: NORMAL
BACTERIA BLD CULT: NORMAL

## 2023-06-21 ENCOUNTER — HOSPITAL ENCOUNTER (EMERGENCY)
Facility: HOSPITAL | Age: 63
Discharge: HOME | End: 2023-06-22
Attending: FAMILY MEDICINE
Payer: MEDICAID

## 2023-06-21 DIAGNOSIS — R56.9 SEIZURE-LIKE ACTIVITY: Primary | ICD-10-CM

## 2023-06-21 DIAGNOSIS — R56.9 SEIZURE: ICD-10-CM

## 2023-06-21 LAB
ALBUMIN SERPL-MCNC: 4.5 G/DL (ref 3.4–4.8)
ALBUMIN/GLOB SERPL: 1.4 RATIO (ref 1.1–2)
ALP SERPL-CCNC: 60 UNIT/L (ref 40–150)
ALT SERPL-CCNC: 7 UNIT/L (ref 0–55)
AST SERPL-CCNC: 15 UNIT/L (ref 5–34)
BASOPHILS # BLD AUTO: 0.05 X10(3)/MCL
BASOPHILS NFR BLD AUTO: 0.6 %
BILIRUBIN DIRECT+TOT PNL SERPL-MCNC: 0.6 MG/DL
BUN SERPL-MCNC: 14.7 MG/DL (ref 8.4–25.7)
CALCIUM SERPL-MCNC: 9.6 MG/DL (ref 8.8–10)
CHLORIDE SERPL-SCNC: 101 MMOL/L (ref 98–107)
CO2 SERPL-SCNC: 29 MMOL/L (ref 23–31)
CREAT SERPL-MCNC: 0.87 MG/DL (ref 0.73–1.18)
EOSINOPHIL # BLD AUTO: 0.2 X10(3)/MCL (ref 0–0.9)
EOSINOPHIL NFR BLD AUTO: 2.6 %
ERYTHROCYTE [DISTWIDTH] IN BLOOD BY AUTOMATED COUNT: 13.8 % (ref 11.5–17)
GFR SERPLBLD CREATININE-BSD FMLA CKD-EPI: >60 MLS/MIN/1.73/M2
GLOBULIN SER-MCNC: 3.2 GM/DL (ref 2.4–3.5)
GLUCOSE SERPL-MCNC: 96 MG/DL (ref 82–115)
HCT VFR BLD AUTO: 43 % (ref 42–52)
HGB BLD-MCNC: 13.7 G/DL (ref 14–18)
IMM GRANULOCYTES # BLD AUTO: 0.01 X10(3)/MCL (ref 0–0.04)
IMM GRANULOCYTES NFR BLD AUTO: 0.1 %
LYMPHOCYTES # BLD AUTO: 1.96 X10(3)/MCL (ref 0.6–4.6)
LYMPHOCYTES NFR BLD AUTO: 25.1 %
MCH RBC QN AUTO: 29 PG (ref 27–31)
MCHC RBC AUTO-ENTMCNC: 31.9 G/DL (ref 33–36)
MCV RBC AUTO: 91.1 FL (ref 80–94)
MONOCYTES # BLD AUTO: 0.6 X10(3)/MCL (ref 0.1–1.3)
MONOCYTES NFR BLD AUTO: 7.7 %
NEUTROPHILS # BLD AUTO: 4.98 X10(3)/MCL (ref 2.1–9.2)
NEUTROPHILS NFR BLD AUTO: 63.9 %
NRBC BLD AUTO-RTO: 0 %
PLATELET # BLD AUTO: 211 X10(3)/MCL (ref 130–400)
PMV BLD AUTO: 9.6 FL (ref 7.4–10.4)
POTASSIUM SERPL-SCNC: 4 MMOL/L (ref 3.5–5.1)
PROT SERPL-MCNC: 7.7 GM/DL (ref 5.8–7.6)
RBC # BLD AUTO: 4.72 X10(6)/MCL (ref 4.7–6.1)
SODIUM SERPL-SCNC: 141 MMOL/L (ref 136–145)
WBC # SPEC AUTO: 7.8 X10(3)/MCL (ref 4.5–11.5)

## 2023-06-21 PROCEDURE — 80053 COMPREHEN METABOLIC PANEL: CPT | Performed by: FAMILY MEDICINE

## 2023-06-21 PROCEDURE — 93005 ELECTROCARDIOGRAM TRACING: CPT

## 2023-06-21 PROCEDURE — 85025 COMPLETE CBC W/AUTO DIFF WBC: CPT | Performed by: FAMILY MEDICINE

## 2023-06-21 PROCEDURE — 99283 EMERGENCY DEPT VISIT LOW MDM: CPT

## 2023-06-22 VITALS
WEIGHT: 149.94 LBS | DIASTOLIC BLOOD PRESSURE: 85 MMHG | RESPIRATION RATE: 19 BRPM | TEMPERATURE: 97 F | SYSTOLIC BLOOD PRESSURE: 107 MMHG | HEART RATE: 87 BPM | BODY MASS INDEX: 22.72 KG/M2 | OXYGEN SATURATION: 93 % | HEIGHT: 68 IN

## 2023-06-22 NOTE — ED PROVIDER NOTES
Encounter Date: 6/21/2023       History     Chief Complaint   Patient presents with    Seizures     Sz at [facility] PTA, unwitnessed     Patient is a 63-year-old gentleman brought to emergency room from the [facility] for evaluation due to concerns of seizure-like activity.  Patient reports a history of seizures.  Currently compliant with his medications-Keppra 1000 mg twice a day.  Patient does not remember having a seizure.  The fellow resident stated that he was underneath this cover shaking.  Patient was awake and alert when EMS arrived, though they said he did appear slightly confused initially.  Patient currently awake and alert.  Reports feeling his normal state of health.  Denies chest pain shortness breath nausea or vomiting.    The history is provided by the patient.   Review of patient's allergies indicates:   Allergen Reactions    Keflex [cephalexin]      Past Medical History:   Diagnosis Date    Asthma     COPD (chronic obstructive pulmonary disease)     Seizures      History reviewed. No pertinent surgical history.  History reviewed. No pertinent family history.  Social History     Tobacco Use    Smoking status: Every Day     Packs/day: 0.50     Types: Cigarettes    Smokeless tobacco: Never   Substance Use Topics    Alcohol use: Yes     Alcohol/week: 2.0 standard drinks     Types: 2 Cans of beer per week     Comment: daily (pint of vodka per day)    Drug use: Yes     Types: Marijuana     Comment: daily     Review of Systems   Constitutional:  Negative for chills, fatigue and fever.   HENT:  Negative for ear pain, rhinorrhea and sore throat.    Eyes:  Negative for photophobia and pain.   Respiratory:  Negative for cough, shortness of breath and wheezing.    Cardiovascular:  Negative for chest pain.   Gastrointestinal:  Negative for abdominal pain, diarrhea, nausea and vomiting.   Genitourinary:  Negative for dysuria.   Neurological:  Negative for dizziness, weakness and headaches.   All other systems  reviewed and are negative.    Physical Exam     Initial Vitals [06/21/23 2304]   BP Pulse Resp Temp SpO2   (!) 145/72 77 20 97.2 °F (36.2 °C) 99 %      MAP       --         Physical Exam    Nursing note and vitals reviewed.  Constitutional: He appears well-developed and well-nourished.   HENT:   Head: Normocephalic and atraumatic.   Eyes: EOM are normal. Pupils are equal, round, and reactive to light.   Neck: Neck supple.   Normal range of motion.  Cardiovascular:  Normal rate, regular rhythm, normal heart sounds and intact distal pulses.     Exam reveals no gallop and no friction rub.       No murmur heard.  Pulmonary/Chest: Breath sounds normal. No respiratory distress.   Abdominal: Abdomen is soft. Bowel sounds are normal. He exhibits no distension. There is no abdominal tenderness.   Musculoskeletal:         General: Normal range of motion.      Cervical back: Normal range of motion and neck supple.     Neurological: He is alert and oriented to person, place, and time. He has normal strength.   Skin: Skin is warm and dry.   Psychiatric: He has a normal mood and affect. His behavior is normal. Judgment and thought content normal.       ED Course   Procedures  Labs Reviewed   COMPREHENSIVE METABOLIC PANEL - Abnormal; Notable for the following components:       Result Value    Protein Total 7.7 (*)     All other components within normal limits   CBC WITH DIFFERENTIAL - Abnormal; Notable for the following components:    Hgb 13.7 (*)     MCHC 31.9 (*)     All other components within normal limits   CBC W/ AUTO DIFFERENTIAL    Narrative:     The following orders were created for panel order CBC Auto Differential.  Procedure                               Abnormality         Status                     ---------                               -----------         ------                     CBC with Differential[[id number]]        Abnormal            Final result                 Please view results for these tests on the  individual orders.   URINALYSIS, REFLEX TO URINE CULTURE   EXTRA TUBES    Narrative:     The following orders were created for panel order EXTRA TUBES.  Procedure                               Abnormality         Status                     ---------                               -----------         ------                     Red Top Hold[[id number]]                                                                  Please view results for these tests on the individual orders.   RED TOP HOLD     EKG Readings: (Independently Interpreted)   My Independent EKG Interpretation  06/21/2023 11:50 PM  Rate: 83 bpm  Rhythm: Sinus  Axis: Leftward  Intervals: Right bundle branch block  ST Changes: None  Impression: Normal sinus rhythm with right bundle branch block         Imaging Results    None          Medications - No data to display  Medical Decision Making:   Initial Assessment:   Patient has a history of seizures, brought to emergency room for evaluation for potential seizure.  Currently awake and alert denying any complaints at this time.  Will check basic laboratory evaluation including a CBC CMP to evaluate for electrolyte abnormality or infectious process, will continue to monitor here in the emergency room for any additional seizure-like activityy  Differential Diagnosis:   Electrolyte abnormality, recurrent seizures, Seizure like activity           ED Course as of 06/22/23 0027   u Jun 22, 2023   0024 Sodium: 141 [MW]   0024 Potassium: 4.0 [MW]   0024 Chloride: 101 [MW]   0024 CO2: 29 [MW]   0024 Glucose: 96 [MW]   0025 BUN: 14.7 [MW]   0025 Creatinine: 0.87 [MW]   0025 Calcium: 9.6 [MW]   0025 PROTEIN TOTAL(!): 7.7 [MW]   0025 Albumin: 4.5 [MW]   0025 Globulin, Total: 3.2 [MW]   0025 Albumin/Globulin Ratio: 1.4 [MW]   0025 BILIRUBIN TOTAL: 0.6 [MW]   0025 Alkaline Phosphatase: 60 [MW]   0025 ALT: 7 [MW]   0025 AST: 15 [MW]   0025 eGFR: >60 [MW]   0025 WBC: 7.80 [MW]   0025 Hemoglobin(!): 13.7 [MW]   0025  Hematocrit: 43.0 [MW]   0025 Platelets: 211  Patient remains awake and alert in no acute distress.  Currently on seizure medicines.  Stable for discharge back to shelter.  ER precautions given for any acute worsening. [MW]      ED Course User Index  [MW] Junito Plummer MD                 Clinical Impression:   Final diagnoses:  [R56.9] Seizure  [R56.9] Seizure-like activity (Primary)        ED Disposition Condition    Discharge Stable          ED Prescriptions    None       Follow-up Information       Follow up With Specialties Details Why Contact Info    [facility]    [address] [address]  [phone]      [facility] Emergency Dept Emergency Medicine  As needed, If symptoms worsen [address] [facility] [address] [phone]             Junito Plummer MD  06/22/23 0027

## 2023-06-26 ENCOUNTER — HOSPITAL ENCOUNTER (EMERGENCY)
Facility: HOSPITAL | Age: 63
Discharge: HOME | End: 2023-06-26
Attending: INTERNAL MEDICINE
Payer: MEDICAID

## 2023-06-26 VITALS
HEART RATE: 86 BPM | OXYGEN SATURATION: 92 % | RESPIRATION RATE: 22 BRPM | SYSTOLIC BLOOD PRESSURE: 100 MMHG | DIASTOLIC BLOOD PRESSURE: 76 MMHG

## 2023-06-26 DIAGNOSIS — G40.909 SEIZURE DISORDER: Primary | ICD-10-CM

## 2023-06-26 LAB
ALBUMIN SERPL-MCNC: 4.3 G/DL (ref 3.4–4.8)
ALBUMIN/GLOB SERPL: 1.3 RATIO (ref 1.1–2)
ALP SERPL-CCNC: 77 UNIT/L (ref 40–150)
ALT SERPL-CCNC: 7 UNIT/L (ref 0–55)
AST SERPL-CCNC: 14 UNIT/L (ref 5–34)
BILIRUBIN DIRECT+TOT PNL SERPL-MCNC: 0.5 MG/DL
BUN SERPL-MCNC: 12.6 MG/DL (ref 8.4–25.7)
CALCIUM SERPL-MCNC: 9.5 MG/DL (ref 8.8–10)
CHLORIDE SERPL-SCNC: 99 MMOL/L (ref 98–107)
CO2 SERPL-SCNC: 32 MMOL/L (ref 23–31)
CREAT SERPL-MCNC: 0.96 MG/DL (ref 0.73–1.18)
GFR SERPLBLD CREATININE-BSD FMLA CKD-EPI: >60 MLS/MIN/1.73/M2
GLOBULIN SER-MCNC: 3.2 GM/DL (ref 2.4–3.5)
GLUCOSE SERPL-MCNC: 96 MG/DL (ref 82–115)
POTASSIUM SERPL-SCNC: 4.2 MMOL/L (ref 3.5–5.1)
PROT SERPL-MCNC: 7.5 GM/DL (ref 5.8–7.6)
SODIUM SERPL-SCNC: 140 MMOL/L (ref 136–145)

## 2023-06-26 PROCEDURE — 80053 COMPREHEN METABOLIC PANEL: CPT | Performed by: INTERNAL MEDICINE

## 2023-06-26 PROCEDURE — 99283 EMERGENCY DEPT VISIT LOW MDM: CPT

## 2023-06-26 NOTE — ED PROVIDER NOTES
Encounter Date: 6/26/2023       History     Chief Complaint   Patient presents with    Seizures     Seizure while laying in bunk at the shelter.     Presents by EMS after a seizure episode while in the shelter today. States Hx of seizures taking his Keppra. Denies injury or pain.     The history is provided by the patient and the EMS personnel.   Review of patient's allergies indicates:   Allergen Reactions    Keflex [cephalexin]      Past Medical History:   Diagnosis Date    Asthma     COPD (chronic obstructive pulmonary disease)     Seizures      No past surgical history on file.  No family history on file.  Social History     Tobacco Use    Smoking status: Every Day     Packs/day: 0.50     Types: Cigarettes    Smokeless tobacco: Never   Substance Use Topics    Alcohol use: Yes     Alcohol/week: 2.0 standard drinks     Types: 2 Cans of beer per week     Comment: daily (pint of vodka per day)    Drug use: Yes     Types: Marijuana     Comment: daily     Review of Systems   Constitutional:  Negative for fever.   HENT:  Negative for sore throat.    Respiratory:  Negative for shortness of breath.    Cardiovascular:  Negative for chest pain.   Gastrointestinal:  Negative for nausea.   Genitourinary:  Negative for dysuria.   Musculoskeletal:  Negative for back pain.   Skin:  Negative for rash.   Neurological:  Positive for seizures. Negative for weakness.   Hematological:  Does not bruise/bleed easily.   All other systems reviewed and are negative.    Physical Exam     Initial Vitals   BP Pulse Resp Temp SpO2   06/26/23 1825 06/26/23 1826 06/26/23 1932 -- 06/26/23 1826   94/67 87 (!) 21  (!) 93 %      MAP       --                Physical Exam    Nursing note and vitals reviewed.  Constitutional: He appears well-developed and well-nourished. No distress.   HENT:   Head: Normocephalic and atraumatic.   Mouth/Throat: Oropharynx is clear and moist.   Eyes: Conjunctivae and EOM are normal. Pupils are equal, round, and  reactive to light.   Neck: Neck supple.   Normal range of motion.  Cardiovascular:  Normal rate, regular rhythm, normal heart sounds and intact distal pulses.           Pulmonary/Chest: Breath sounds normal. No respiratory distress.   Abdominal: Abdomen is soft. Bowel sounds are normal. He exhibits no distension. There is no abdominal tenderness. There is no rebound and no guarding.   Musculoskeletal:         General: No edema. Normal range of motion.      Cervical back: Normal range of motion and neck supple.     Neurological: He is alert and oriented to person, place, and time. He has normal strength. GCS score is 15. GCS eye subscore is 4. GCS verbal subscore is 5. GCS motor subscore is 6.   Skin: Skin is warm and dry. No rash noted.   Psychiatric: His behavior is normal.       ED Course   Procedures  Labs Reviewed   COMPREHENSIVE METABOLIC PANEL - Abnormal; Notable for the following components:       Result Value    Carbon Dioxide 32 (*)     All other components within normal limits   EXTRA TUBES    Narrative:     The following orders were created for panel order EXTRA TUBES.  Procedure                               Abnormality         Status                     ---------                               -----------         ------                     Light Blue Top Hold[[id number]]                              In process                 Red Top Hold[[id number]]                                     In process                 Lavender Top Hold[[id number]]                                In process                   Please view results for these tests on the individual orders.   LIGHT BLUE TOP HOLD   RED TOP HOLD   LAVENDER TOP HOLD          Imaging Results    None          Medications - No data to display  Medical Decision Making:   Differential Diagnosis:   Febrile seizure, epilepsy, brain mass, intoxication, medication side effect, stroke, dysrhythmia, among others    Clinical Tests:   Lab Tests: Ordered                9:43 PM    Pt stable, AAOx3, eating, states feeling fine. Pt stable for discharge         Clinical Impression:   Final diagnoses:  [G40.909] Seizure disorder (Primary)        ED Disposition Condition    Discharge Stable          ED Prescriptions    None       Follow-up Information       Follow up With Specialties Details Why Contact Info    [facility]  In 2 weeks  [address] [facility] [address]  [facility] [address]  [phone]      [facility] Emergency Dept Emergency Medicine  If symptoms worsen [address] [facility] [address] [phone]             Ovidio Rboles MD  06/26/23 5786

## 2023-09-03 ENCOUNTER — HOSPITAL ENCOUNTER (OUTPATIENT)
Facility: HOSPITAL | Age: 63
Discharge: HOME | End: 2023-09-04
Attending: FAMILY MEDICINE | Admitting: STUDENT IN AN ORGANIZED HEALTH CARE EDUCATION/TRAINING PROGRAM
Payer: MEDICAID

## 2023-09-03 DIAGNOSIS — R07.9 CHEST PAIN: ICD-10-CM

## 2023-09-03 DIAGNOSIS — R55 NEAR SYNCOPE: Primary | ICD-10-CM

## 2023-09-03 DIAGNOSIS — R42 DIZZINESS: ICD-10-CM

## 2023-09-03 DIAGNOSIS — I50.42 CHRONIC COMBINED SYSTOLIC AND DIASTOLIC HEART FAILURE: ICD-10-CM

## 2023-09-03 DIAGNOSIS — I95.9 HYPOTENSION, UNSPECIFIED HYPOTENSION TYPE: ICD-10-CM

## 2023-09-03 LAB
ALBUMIN SERPL-MCNC: 4.7 G/DL (ref 3.4–4.8)
ALBUMIN/GLOB SERPL: 1.3 RATIO (ref 1.1–2)
ALP SERPL-CCNC: 75 UNIT/L (ref 40–150)
ALT SERPL-CCNC: 9 UNIT/L (ref 0–55)
AMPHET UR QL SCN: NEGATIVE
APPEARANCE UR: CLEAR
AST SERPL-CCNC: 15 UNIT/L (ref 5–34)
BACTERIA #/AREA URNS AUTO: NORMAL /HPF
BARBITURATE SCN PRESENT UR: NEGATIVE
BASOPHILS # BLD AUTO: 0.06 X10(3)/MCL
BASOPHILS NFR BLD AUTO: 0.7 %
BENZODIAZ UR QL SCN: NEGATIVE
BILIRUB SERPL-MCNC: 0.9 MG/DL
BILIRUB UR QL STRIP.AUTO: NEGATIVE
BUN SERPL-MCNC: 28.1 MG/DL (ref 8.4–25.7)
CALCIUM SERPL-MCNC: 10.4 MG/DL (ref 8.8–10)
CANNABINOIDS UR QL SCN: NEGATIVE
CHLORIDE SERPL-SCNC: 104 MMOL/L (ref 98–107)
CK SERPL-CCNC: 81 U/L (ref 30–200)
CO2 SERPL-SCNC: 30 MMOL/L (ref 23–31)
COCAINE UR QL SCN: NEGATIVE
COLOR UR: NORMAL
CREAT SERPL-MCNC: 1.56 MG/DL (ref 0.73–1.18)
EOSINOPHIL # BLD AUTO: 0.09 X10(3)/MCL (ref 0–0.9)
EOSINOPHIL NFR BLD AUTO: 1.1 %
ERYTHROCYTE [DISTWIDTH] IN BLOOD BY AUTOMATED COUNT: 13 % (ref 11.5–17)
FENTANYL UR QL SCN: NEGATIVE
GFR SERPLBLD CREATININE-BSD FMLA CKD-EPI: 50 MLS/MIN/1.73/M2
GLOBULIN SER-MCNC: 3.5 GM/DL (ref 2.4–3.5)
GLUCOSE SERPL-MCNC: 102 MG/DL (ref 82–115)
GLUCOSE UR QL STRIP.AUTO: NORMAL
HAV IGM SERPL QL IA: NONREACTIVE
HBV CORE IGM SERPL QL IA: NONREACTIVE
HBV SURFACE AG SERPL QL IA: NONREACTIVE
HCT VFR BLD AUTO: 47.4 % (ref 42–52)
HCV AB SERPL QL IA: NONREACTIVE
HGB BLD-MCNC: 14.9 G/DL (ref 14–18)
HYALINE CASTS #/AREA URNS LPF: NORMAL /LPF
IMM GRANULOCYTES # BLD AUTO: 0.02 X10(3)/MCL (ref 0–0.04)
IMM GRANULOCYTES NFR BLD AUTO: 0.2 %
KETONES UR QL STRIP.AUTO: NEGATIVE
LEUKOCYTE ESTERASE UR QL STRIP.AUTO: NEGATIVE
LYMPHOCYTES # BLD AUTO: 1.65 X10(3)/MCL (ref 0.6–4.6)
LYMPHOCYTES NFR BLD AUTO: 19.6 %
MAGNESIUM SERPL-MCNC: 2 MG/DL (ref 1.6–2.6)
MCH RBC QN AUTO: 29.3 PG (ref 27–31)
MCHC RBC AUTO-ENTMCNC: 31.4 G/DL (ref 33–36)
MCV RBC AUTO: 93.1 FL (ref 80–94)
MDMA UR QL SCN: NEGATIVE
MONOCYTES # BLD AUTO: 0.59 X10(3)/MCL (ref 0.1–1.3)
MONOCYTES NFR BLD AUTO: 7 %
NEUTROPHILS # BLD AUTO: 6.01 X10(3)/MCL (ref 2.1–9.2)
NEUTROPHILS NFR BLD AUTO: 71.4 %
NITRITE UR QL STRIP.AUTO: NEGATIVE
NRBC BLD AUTO-RTO: 0 %
OPIATES UR QL SCN: NEGATIVE
PCP UR QL: NEGATIVE
PH UR STRIP.AUTO: 5.5 [PH]
PH UR: 5.5 [PH] (ref 3–11)
PHOSPHATE SERPL-MCNC: 4.3 MG/DL (ref 2.3–4.7)
PLATELET # BLD AUTO: 250 X10(3)/MCL (ref 130–400)
PMV BLD AUTO: 10 FL (ref 7.4–10.4)
POTASSIUM SERPL-SCNC: 4.8 MMOL/L (ref 3.5–5.1)
PROT SERPL-MCNC: 8.2 GM/DL (ref 5.8–7.6)
PROT UR QL STRIP.AUTO: NEGATIVE
RBC # BLD AUTO: 5.09 X10(6)/MCL (ref 4.7–6.1)
RBC #/AREA URNS AUTO: NORMAL /HPF
RBC UR QL AUTO: NEGATIVE
SODIUM SERPL-SCNC: 145 MMOL/L (ref 136–145)
SP GR UR STRIP.AUTO: 1.02 (ref 1–1.03)
SPECIFIC GRAVITY, URINE AUTO (.000) (OHS): 1.02 (ref 1–1.03)
SQUAMOUS #/AREA URNS LPF: NORMAL /HPF
TROPONIN I SERPL-MCNC: 0.01 NG/ML (ref 0–0.04)
UROBILINOGEN UR STRIP-ACNC: NORMAL
VIT B12 SERPL-MCNC: 312 PG/ML (ref 213–816)
WBC # SPEC AUTO: 8.42 X10(3)/MCL (ref 4.5–11.5)
WBC #/AREA URNS AUTO: NORMAL /HPF

## 2023-09-03 PROCEDURE — 94761 N-INVAS EAR/PLS OXIMETRY MLT: CPT

## 2023-09-03 PROCEDURE — 96360 HYDRATION IV INFUSION INIT: CPT

## 2023-09-03 PROCEDURE — 93005 ELECTROCARDIOGRAM TRACING: CPT

## 2023-09-03 PROCEDURE — 82607 VITAMIN B-12: CPT

## 2023-09-03 PROCEDURE — 80053 COMPREHEN METABOLIC PANEL: CPT | Performed by: PHYSICIAN ASSISTANT

## 2023-09-03 PROCEDURE — 84484 ASSAY OF TROPONIN QUANT: CPT | Performed by: PHYSICIAN ASSISTANT

## 2023-09-03 PROCEDURE — 99285 EMERGENCY DEPT VISIT HI MDM: CPT | Mod: 25

## 2023-09-03 PROCEDURE — 94640 AIRWAY INHALATION TREATMENT: CPT

## 2023-09-03 PROCEDURE — 25000003 PHARM REV CODE 250

## 2023-09-03 PROCEDURE — 82077 ASSAY SPEC XCP UR&BREATH IA: CPT | Performed by: STUDENT IN AN ORGANIZED HEALTH CARE EDUCATION/TRAINING PROGRAM

## 2023-09-03 PROCEDURE — 85025 COMPLETE CBC W/AUTO DIFF WBC: CPT | Performed by: PHYSICIAN ASSISTANT

## 2023-09-03 PROCEDURE — 84100 ASSAY OF PHOSPHORUS: CPT

## 2023-09-03 PROCEDURE — 80074 ACUTE HEPATITIS PANEL: CPT

## 2023-09-03 PROCEDURE — 25000003 PHARM REV CODE 250: Performed by: PHYSICIAN ASSISTANT

## 2023-09-03 PROCEDURE — 96361 HYDRATE IV INFUSION ADD-ON: CPT

## 2023-09-03 PROCEDURE — 25000242 PHARM REV CODE 250 ALT 637 W/ HCPCS

## 2023-09-03 PROCEDURE — 83735 ASSAY OF MAGNESIUM: CPT

## 2023-09-03 PROCEDURE — G0378 HOSPITAL OBSERVATION PER HR: HCPCS

## 2023-09-03 PROCEDURE — 82550 ASSAY OF CK (CPK): CPT | Performed by: PHYSICIAN ASSISTANT

## 2023-09-03 PROCEDURE — 80307 DRUG TEST PRSMV CHEM ANLYZR: CPT | Performed by: STUDENT IN AN ORGANIZED HEALTH CARE EDUCATION/TRAINING PROGRAM

## 2023-09-03 PROCEDURE — 81001 URINALYSIS AUTO W/SCOPE: CPT | Performed by: PHYSICIAN ASSISTANT

## 2023-09-03 RX ORDER — GUAIFENESIN 600 MG/1
600 TABLET, EXTENDED RELEASE ORAL 2 TIMES DAILY
Status: DISCONTINUED | OUTPATIENT
Start: 2023-09-03 | End: 2023-09-04 | Stop reason: HOSPADM

## 2023-09-03 RX ORDER — SODIUM CHLORIDE 9 MG/ML
INJECTION, SOLUTION INTRAVENOUS CONTINUOUS
Status: ACTIVE | OUTPATIENT
Start: 2023-09-03 | End: 2023-09-04

## 2023-09-03 RX ORDER — GLUCAGON 1 MG
1 KIT INJECTION
Status: DISCONTINUED | OUTPATIENT
Start: 2023-09-03 | End: 2023-09-04 | Stop reason: HOSPADM

## 2023-09-03 RX ORDER — TALC
6 POWDER (GRAM) TOPICAL NIGHTLY PRN
Status: DISCONTINUED | OUTPATIENT
Start: 2023-09-03 | End: 2023-09-04 | Stop reason: HOSPADM

## 2023-09-03 RX ORDER — IPRATROPIUM BROMIDE AND ALBUTEROL SULFATE 2.5; .5 MG/3ML; MG/3ML
3 SOLUTION RESPIRATORY (INHALATION)
Status: DISCONTINUED | OUTPATIENT
Start: 2023-09-03 | End: 2023-09-04 | Stop reason: HOSPADM

## 2023-09-03 RX ORDER — CHLORDIAZEPOXIDE HYDROCHLORIDE 25 MG/1
25 CAPSULE, GELATIN COATED ORAL 4 TIMES DAILY PRN
Status: DISCONTINUED | OUTPATIENT
Start: 2023-09-03 | End: 2023-09-04 | Stop reason: HOSPADM

## 2023-09-03 RX ORDER — SODIUM CHLORIDE 0.9 % (FLUSH) 0.9 %
10 SYRINGE (ML) INJECTION EVERY 12 HOURS PRN
Status: DISCONTINUED | OUTPATIENT
Start: 2023-09-03 | End: 2023-09-04 | Stop reason: HOSPADM

## 2023-09-03 RX ORDER — LEVETIRACETAM 500 MG/1
1000 TABLET ORAL 2 TIMES DAILY
Status: DISCONTINUED | OUTPATIENT
Start: 2023-09-03 | End: 2023-09-04 | Stop reason: HOSPADM

## 2023-09-03 RX ORDER — THIAMINE HCL 100 MG
100 TABLET ORAL DAILY
Status: DISCONTINUED | OUTPATIENT
Start: 2023-09-04 | End: 2023-09-04 | Stop reason: HOSPADM

## 2023-09-03 RX ORDER — POLYETHYLENE GLYCOL 3350 17 G/17G
17 POWDER, FOR SOLUTION ORAL DAILY
Status: DISCONTINUED | OUTPATIENT
Start: 2023-09-04 | End: 2023-09-04 | Stop reason: HOSPADM

## 2023-09-03 RX ORDER — AMOXICILLIN 250 MG
1 CAPSULE ORAL DAILY PRN
Status: DISCONTINUED | OUTPATIENT
Start: 2023-09-03 | End: 2023-09-04 | Stop reason: HOSPADM

## 2023-09-03 RX ORDER — FOLIC ACID 1 MG/1
1 TABLET ORAL DAILY
Status: DISCONTINUED | OUTPATIENT
Start: 2023-09-04 | End: 2023-09-04 | Stop reason: HOSPADM

## 2023-09-03 RX ORDER — IBUPROFEN 200 MG
24 TABLET ORAL
Status: DISCONTINUED | OUTPATIENT
Start: 2023-09-03 | End: 2023-09-04 | Stop reason: HOSPADM

## 2023-09-03 RX ORDER — NALOXONE HCL 0.4 MG/ML
0.02 VIAL (ML) INJECTION
Status: DISCONTINUED | OUTPATIENT
Start: 2023-09-03 | End: 2023-09-04 | Stop reason: HOSPADM

## 2023-09-03 RX ORDER — LORAZEPAM 1 MG/1
2 TABLET ORAL EVERY 4 HOURS PRN
Status: DISCONTINUED | OUTPATIENT
Start: 2023-09-03 | End: 2023-09-04 | Stop reason: HOSPADM

## 2023-09-03 RX ORDER — IBUPROFEN 200 MG
16 TABLET ORAL
Status: DISCONTINUED | OUTPATIENT
Start: 2023-09-03 | End: 2023-09-04 | Stop reason: HOSPADM

## 2023-09-03 RX ADMIN — SODIUM CHLORIDE: 9 INJECTION, SOLUTION INTRAVENOUS at 10:09

## 2023-09-03 RX ADMIN — IPRATROPIUM BROMIDE AND ALBUTEROL SULFATE 3 ML: .5; 3 SOLUTION RESPIRATORY (INHALATION) at 11:09

## 2023-09-03 RX ADMIN — LEVETIRACETAM 1000 MG: 500 TABLET, FILM COATED ORAL at 10:09

## 2023-09-03 RX ADMIN — SODIUM CHLORIDE 1000 ML: 9 INJECTION, SOLUTION INTRAVENOUS at 06:09

## 2023-09-03 RX ADMIN — SODIUM CHLORIDE 1000 ML: 9 INJECTION, SOLUTION INTRAVENOUS at 07:09

## 2023-09-03 RX ADMIN — GUAIFENESIN 600 MG: 600 TABLET, EXTENDED RELEASE ORAL at 10:09

## 2023-09-03 NOTE — ED PROVIDER NOTES
Encounter Date: 9/3/2023       History     Chief Complaint   Patient presents with    Dizziness     Dizzy spells (x)1 hour. Also states this has been prevalent for 1 year. Vss. bethherminio Pierson is a 63 y.o. male with a history of asthma, COPD, and seizures who presents to the ED complaining of dizziness and lightheadedness. Patient states he was walking outside in the heat when he developed dizziness, lightheadedness, and sensation that he was going to pass out. He sat down and symptoms improved. Says this has happened before and he was told it was from dehydration. He denies seizure activity, LOC, head trauma. Denies fevers, chills, chest pain, SOB, N/V/D.    The history is provided by the patient.     Review of patient's allergies indicates:   Allergen Reactions    Keflex [cephalexin]      Past Medical History:   Diagnosis Date    Asthma     COPD (chronic obstructive pulmonary disease)     Seizures      No past surgical history on file.  No family history on file.  Social History     Tobacco Use    Smoking status: Every Day     Current packs/day: 0.50     Types: Cigarettes    Smokeless tobacco: Never   Substance Use Topics    Alcohol use: Yes     Alcohol/week: 2.0 standard drinks of alcohol     Types: 2 Cans of beer per week     Comment: daily (pint of vodka per day)    Drug use: Yes     Types: Marijuana     Comment: daily     Review of Systems   Constitutional:  Negative for activity change, chills and fever.   HENT:  Negative for congestion and trouble swallowing.    Eyes:  Negative for photophobia and visual disturbance.   Respiratory:  Negative for chest tightness, shortness of breath and wheezing.    Cardiovascular:  Negative for chest pain, palpitations and leg swelling.   Gastrointestinal:  Negative for abdominal pain, constipation, diarrhea, nausea and vomiting.   Genitourinary:  Negative for dysuria, frequency, hematuria and urgency.   Musculoskeletal:  Negative for arthralgias, back pain and gait  problem.   Skin:  Negative for color change and rash.   Neurological:  Positive for dizziness and light-headedness. Negative for syncope, weakness, numbness and headaches.   Psychiatric/Behavioral:  Negative for agitation and confusion. The patient is not nervous/anxious.        Physical Exam     Initial Vitals [09/03/23 1657]   BP Pulse Resp Temp SpO2   118/70 (!) 111 16 98.5 °F (36.9 °C) 98 %      MAP       --         Physical Exam    Nursing note and vitals reviewed.  Constitutional: He appears well-developed and well-nourished. No distress.   HENT:   Head: Normocephalic and atraumatic.   Mouth/Throat: No oropharyngeal exudate.   Eyes: EOM are normal. No scleral icterus.   Neck: Neck supple.   Normal range of motion.  Cardiovascular:  Normal rate and regular rhythm.           No murmur heard.  Pulmonary/Chest: No respiratory distress. He has no wheezes.   Abdominal: Abdomen is soft. He exhibits no distension. There is no abdominal tenderness.   Musculoskeletal:         General: No edema. Normal range of motion.      Cervical back: Normal range of motion and neck supple.     Neurological: He is alert and oriented to person, place, and time. No cranial nerve deficit.   Skin: Skin is warm and dry. Capillary refill takes less than 2 seconds. No erythema.   Psychiatric: He has a normal mood and affect. Thought content normal.         ED Course   Procedures  Labs Reviewed   COMPREHENSIVE METABOLIC PANEL - Abnormal; Notable for the following components:       Result Value    Blood Urea Nitrogen 28.1 (*)     Creatinine 1.56 (*)     Calcium Level Total 10.4 (*)     Protein Total 8.2 (*)     All other components within normal limits   CBC WITH DIFFERENTIAL - Abnormal; Notable for the following components:    MCHC 31.4 (*)     All other components within normal limits   URINALYSIS, REFLEX TO URINE CULTURE - Normal   CK - Normal   TROPONIN I - Normal   MAGNESIUM - Normal   PHOSPHORUS - Normal   VITAMIN B12 - Normal    HEPATITIS PANEL, ACUTE - Normal   DRUG SCREEN, URINE (BEAKER) - Normal    Narrative:     Cut off concentrations:    Amphetamines - 1000 ng/ml  Barbiturates - 200 ng/ml  Benzodiazepine - 200 ng/ml  Cannabinoids (THC) - 50 ng/ml  Cocaine - 300 ng/ml  Fentanyl - 1.0 ng/ml  MDMA - 500 ng/ml  Opiates - 300 ng/ml   Phencyclidine (PCP) - 25 ng/ml    Specimen submitted for drug analysis and tested for pH and specific gravity in order to evaluate sample integrity. Suspect tampering if specific gravity is <1.003 and/or pH is not within the range of 4.5 - 8.0  False negatives may result form substances such as bleach added to urine.  False positives may result for the presence of a substance with similar chemical structure to the drug or its metabolite.    This test provides only a PRELIMINARY analytical test result. A more specific alternate chemical method must be used in order to obtain a confirmed analytical result. Gas chromatography/mass spectrometry (GC/MS) is the preferred confirmatory method. Other chemical confirmation methods are available. Clinical consideration and professional judgement should be applied to any drug of abuse test result, particularly when preliminary positive results are used.    Positive results will be confirmed only at the physicians request. Unconfirmed screening results are to be used only for medical purposes (treatment).        ALCOHOL,MEDICAL (ETHANOL) - Normal   CBC W/ AUTO DIFFERENTIAL    Narrative:     The following orders were created for panel order CBC auto differential.  Procedure                               Abnormality         Status                     ---------                               -----------         ------                     CBC with Differential[[id number]]        Abnormal            Final result                 Please view results for these tests on the individual orders.   EXTRA TUBES    Narrative:     The following orders were created for panel order EXTRA  TUBES.  Procedure                               Abnormality         Status                     ---------                               -----------         ------                     Light Blue Top Hold[[id number]]                              In process                 Red Top Hold[[id number]]                                     In process                 Light Green Top Hold[[id number]]                             In process                   Please view results for these tests on the individual orders.   LIGHT BLUE TOP HOLD   RED TOP HOLD   LIGHT GREEN TOP HOLD   PATHOLOGIST INTERPRETATION        ECG Results              EKG 12-lead (Final result)  Result time 09/05/23 19:48:20      Final result by Interface, Lab In [facility] (09/05/23 19:48:20)                   Narrative:    Test Reason : R42,    Vent. Rate : 073 BPM     Atrial Rate : 073 BPM     P-R Int : 162 ms          QRS Dur : 168 ms      QT Int : 448 ms       P-R-T Axes : 063 -83 017 degrees     QTc Int : 493 ms    Normal sinus rhythm with sinus arrhythmia  Left axis deviation  Right bundle branch block  Inferior infarct  Abnormal ECG  Confirmed by Balwinder Valencia MD ([id number]) on 9/5/2023 7:48:09 PM    Referred By: [email or web address]   SELF           Confirmed By:Balwinder Valencia MD                                  Imaging Results    None          Medications   0.9%  NaCl infusion ( Intravenous Verify Only 9/4/23 0710)   sodium chloride 0.9% bolus 1,000 mL 1,000 mL (0 mLs Intravenous Stopped 9/3/23 1901)   sodium chloride 0.9% bolus 1,000 mL 1,000 mL (0 mLs Intravenous Stopped 9/3/23 2043)     Medical Decision Making  Amount and/or Complexity of Data Reviewed  Labs: ordered.              Attending Attestation:     Physician Attestation Statement for NP/PA:       Other NP/PA Attestation Additions:    History of Present Illness: I have seen and evaluated the patient; I have reviewed the PA's documentation, and agree with assessment and plan.    62 y/o male presenting to the ER with  complaints fo lightheadedness after significant heat exposure today.  Patient reports being presyncopal, and therefore presented to the ER for evaluation.     Physical Exam: AAOX3, no distress  S1S2 regular rate and rhythm  Lungs clear to auscultation bilaterally  Abdomen soft, non-tender, nondistended.   Medical Decision Making: On laboratory evaluation, noted to have a slight GAVI, and remains orthostatic even after IV fluids.  Will admit to observation for further treatment.             ED Course as of 09/07/23 0044   Sun Sep 03, 2023   2128 Minimal improvement in BP with 1.5L of IVF. Pt still reports dizziness upon standing. Pt likely needs gentle rehydration and observation due to history of CHF. Discussed with Dr. Plummer who is in agreement. Medicine consulted and they will evaluate patient.  [KD]      ED Course User Index  [KD] Ally Moya PA-C               Medical Decision Making:   Initial Assessment:   Resting comfortably in NAD. HDS and afebrile. AAOx3  Differential Diagnosis:   Orthostatic dizziness, hypotension, dehydration      Clinical Impression:   Final diagnoses:  [R42] Dizziness  [R55] Near syncope (Primary)  [I95.9] Hypotension, unspecified hypotension type        ED Disposition Condition    Observation Stable                Ally Moya PA-C  09/03/23 2227       Junito Plummer MD  09/07/23 0044

## 2023-09-03 NOTE — Clinical Note
Diagnosis: Dizziness [030881]   Future Attending Provider: AV MCCURDY [[id number]]   Admitting Provider:: AV MCCURDY [[id number]]

## 2023-09-04 VITALS
HEART RATE: 63 BPM | OXYGEN SATURATION: 96 % | RESPIRATION RATE: 18 BRPM | SYSTOLIC BLOOD PRESSURE: 118 MMHG | WEIGHT: 139 LBS | HEIGHT: 68 IN | BODY MASS INDEX: 21.07 KG/M2 | TEMPERATURE: 98 F | DIASTOLIC BLOOD PRESSURE: 73 MMHG

## 2023-09-04 PROBLEM — R42 DIZZINESS: Status: ACTIVE | Noted: 2023-09-04

## 2023-09-04 PROBLEM — N17.9 AKI (ACUTE KIDNEY INJURY): Status: ACTIVE | Noted: 2023-09-03

## 2023-09-04 LAB
ALBUMIN SERPL-MCNC: 3.6 G/DL (ref 3.4–4.8)
ALBUMIN/GLOB SERPL: 1.3 RATIO (ref 1.1–2)
ALP SERPL-CCNC: 57 UNIT/L (ref 40–150)
ALT SERPL-CCNC: 7 UNIT/L (ref 0–55)
AST SERPL-CCNC: 12 UNIT/L (ref 5–34)
BASOPHILS # BLD AUTO: 0.04 X10(3)/MCL
BASOPHILS NFR BLD AUTO: 0.7 %
BILIRUB SERPL-MCNC: 0.8 MG/DL
BUN SERPL-MCNC: 21.3 MG/DL (ref 8.4–25.7)
CALCIUM SERPL-MCNC: 8.9 MG/DL (ref 8.8–10)
CHLORIDE SERPL-SCNC: 110 MMOL/L (ref 98–107)
CO2 SERPL-SCNC: 30 MMOL/L (ref 23–31)
CREAT SERPL-MCNC: 0.84 MG/DL (ref 0.73–1.18)
EOSINOPHIL # BLD AUTO: 0.1 X10(3)/MCL (ref 0–0.9)
EOSINOPHIL NFR BLD AUTO: 1.7 %
ERYTHROCYTE [DISTWIDTH] IN BLOOD BY AUTOMATED COUNT: 13.2 % (ref 11.5–17)
ETHANOL SERPL-MCNC: <10 MG/DL
FOLATE SERPL-MCNC: 11.8 NG/ML (ref 7–31.4)
GFR SERPLBLD CREATININE-BSD FMLA CKD-EPI: >60 MLS/MIN/1.73/M2
GLOBULIN SER-MCNC: 2.7 GM/DL (ref 2.4–3.5)
GLUCOSE SERPL-MCNC: 86 MG/DL (ref 82–115)
HCT VFR BLD AUTO: 41.8 % (ref 42–52)
HGB BLD-MCNC: 12.9 G/DL (ref 14–18)
HIV 1+2 AB+HIV1 P24 AG SERPL QL IA: NONREACTIVE
IMM GRANULOCYTES # BLD AUTO: 0.01 X10(3)/MCL (ref 0–0.04)
IMM GRANULOCYTES NFR BLD AUTO: 0.2 %
LYMPHOCYTES # BLD AUTO: 1.43 X10(3)/MCL (ref 0.6–4.6)
LYMPHOCYTES NFR BLD AUTO: 25 %
MAGNESIUM SERPL-MCNC: 2 MG/DL (ref 1.6–2.6)
MCH RBC QN AUTO: 29.2 PG (ref 27–31)
MCHC RBC AUTO-ENTMCNC: 30.9 G/DL (ref 33–36)
MCV RBC AUTO: 94.6 FL (ref 80–94)
MONOCYTES # BLD AUTO: 0.56 X10(3)/MCL (ref 0.1–1.3)
MONOCYTES NFR BLD AUTO: 9.8 %
NEUTROPHILS # BLD AUTO: 3.59 X10(3)/MCL (ref 2.1–9.2)
NEUTROPHILS NFR BLD AUTO: 62.6 %
NRBC BLD AUTO-RTO: 0 %
PHOSPHATE SERPL-MCNC: 3 MG/DL (ref 2.3–4.7)
PLATELET # BLD AUTO: 179 X10(3)/MCL (ref 130–400)
PMV BLD AUTO: 9.8 FL (ref 7.4–10.4)
POTASSIUM SERPL-SCNC: 3.8 MMOL/L (ref 3.5–5.1)
PROT SERPL-MCNC: 6.3 GM/DL (ref 5.8–7.6)
RBC # BLD AUTO: 4.42 X10(6)/MCL (ref 4.7–6.1)
SODIUM SERPL-SCNC: 146 MMOL/L (ref 136–145)
T PALLIDUM AB SER QL: NONREACTIVE
WBC # SPEC AUTO: 5.73 X10(3)/MCL (ref 4.5–11.5)

## 2023-09-04 PROCEDURE — 94640 AIRWAY INHALATION TREATMENT: CPT

## 2023-09-04 PROCEDURE — G0378 HOSPITAL OBSERVATION PER HR: HCPCS

## 2023-09-04 PROCEDURE — 85025 COMPLETE CBC W/AUTO DIFF WBC: CPT

## 2023-09-04 PROCEDURE — 87389 HIV-1 AG W/HIV-1&-2 AB AG IA: CPT

## 2023-09-04 PROCEDURE — 96361 HYDRATE IV INFUSION ADD-ON: CPT

## 2023-09-04 PROCEDURE — 25000003 PHARM REV CODE 250

## 2023-09-04 PROCEDURE — 86780 TREPONEMA PALLIDUM: CPT

## 2023-09-04 PROCEDURE — 80053 COMPREHEN METABOLIC PANEL: CPT

## 2023-09-04 PROCEDURE — 83735 ASSAY OF MAGNESIUM: CPT

## 2023-09-04 PROCEDURE — 82746 ASSAY OF FOLIC ACID SERUM: CPT

## 2023-09-04 PROCEDURE — 84100 ASSAY OF PHOSPHORUS: CPT

## 2023-09-04 PROCEDURE — 25000242 PHARM REV CODE 250 ALT 637 W/ HCPCS

## 2023-09-04 RX ORDER — FOLIC ACID 1 MG/1
1 TABLET ORAL DAILY
Qty: 30 TABLET | Refills: 3 | Status: SHIPPED | OUTPATIENT
Start: 2023-09-05 | End: 2023-10-19 | Stop reason: SDUPTHER

## 2023-09-04 RX ORDER — GUAIFENESIN 600 MG/1
600 TABLET, EXTENDED RELEASE ORAL 2 TIMES DAILY
Qty: 20 TABLET | Refills: 0 | Status: SHIPPED | OUTPATIENT
Start: 2023-09-04 | End: 2023-09-14

## 2023-09-04 RX ADMIN — LEVETIRACETAM 1000 MG: 500 TABLET, FILM COATED ORAL at 08:09

## 2023-09-04 RX ADMIN — IPRATROPIUM BROMIDE AND ALBUTEROL SULFATE 3 ML: .5; 3 SOLUTION RESPIRATORY (INHALATION) at 11:09

## 2023-09-04 RX ADMIN — FOLIC ACID 1 MG: 1 TABLET ORAL at 08:09

## 2023-09-04 RX ADMIN — POLYETHYLENE GLYCOL 3350 17 G: 17 POWDER, FOR SOLUTION ORAL at 08:09

## 2023-09-04 RX ADMIN — Medication 100 MG: at 08:09

## 2023-09-04 RX ADMIN — THERA TABS 1 TABLET: TAB at 08:09

## 2023-09-04 RX ADMIN — SODIUM CHLORIDE: 9 INJECTION, SOLUTION INTRAVENOUS at 01:09

## 2023-09-04 RX ADMIN — GUAIFENESIN 600 MG: 600 TABLET, EXTENDED RELEASE ORAL at 08:09

## 2023-09-04 RX ADMIN — IPRATROPIUM BROMIDE AND ALBUTEROL SULFATE 3 ML: .5; 3 SOLUTION RESPIRATORY (INHALATION) at 07:09

## 2023-09-04 NOTE — PROGRESS NOTES
Confirmed with the [facility] ([phone]) pt will be accepted back today. Requested pt's nurse, Isabell Olson, arrange cab for transport back.

## 2023-09-04 NOTE — DISCHARGE SUMMARY
[facility] Medicine Discharge Summary    Admitting Physician: Cristian Quiñones MD  Attending Physician: Cristian Quiñones MD  Date of Admit: 9/3/2023  Date of Discharge: 9/4/2023    Discharge to:  [facility]  Condition: Stable    Discharge Diagnoses     Patient Active Problem List   Diagnosis    Acute exacerbation of chronic obstructive pulmonary disease (COPD)    Asthma    Seizure disorder    Tobacco abuse    PAF (paroxysmal atrial fibrillation)    Hypotension    Hypotension due to drugs    ETOH abuse    Chronic combined systolic and diastolic heart failure    Syncope and collapse    Anemia    Dizziness    GAVI (acute kidney injury)       Consultants and Procedures     Consultants:  Consults (From admission, onward)          Status Ordering Provider     Inpatient consult to Social Work/Case Management  Once        Provider:  (Not yet assigned)    Completed RAMÓN RILEY     Inpatient consult to Social Work/Case Management  Once        Provider:  (Not yet assigned)    Completed KRUNAL TONG     Inpatient consult to Internal Medicine  Once        Provider:  Kristyn Sparks MD Acknowledged DEVILLIER, KELSEY S.           Procedures:   None    Brief History of Present Illness     Pierre Pierson is a 63 y.o.male with a past medical history of COPD, seizure disorder, and CHFrEF (EF 35% on Echo 1/20233)  who presented to the ED from [facility] on 9/3/2023 with primary c/o dizzy spells. His blood pressure on presentation was 80/63. CMP revealed creatinine 1.56, (baseline <1),  BUN 28.1.     Hospital Course with Pertinent Findings   Patient was given 2L NS in the emergency department and started on maintenance fluids 100cc/hr. Kidney function responded well to fluids - repeat BUN was 21.3, Creatinine 0.84.   On chart review, it was noted that patient was last seen by cardiology during hospitalization for systolic HF exacerbation in January 2023. He had a positive stress test at the time, and  cardiology prescribed metoprolol 12.5mg and recommended an outpatient follow up for further ischemic workup, but patient was lost to follow up and non-compliant with medication.   Upon discharge at this hospital admission, patient was hemodynamically stable, kidney functions improved. Metoprolol was restarted with ambulatory referral to cardiology. Discussed with patient the importance of following up with cards for ischemic workup and EF 35% (1/2023). Patient verbalized understanding.  Case management was consulted. Patient was discharged back to [facility].    Physical Exam  Vitals and nursing note reviewed.   Constitutional:       Appearance: Normal appearance.   HENT:      Mouth/Throat:      Mouth: Mucous membranes are moist.   Cardiovascular:      Rate and Rhythm: Normal rate and regular rhythm.      Pulses: Normal pulses.      Heart sounds: Normal heart sounds.   Pulmonary:      Effort: Pulmonary effort is normal.      Breath sounds: Normal breath sounds.   Abdominal:      General: Bowel sounds are normal.      Palpations: Abdomen is soft.   Musculoskeletal:         General: Normal range of motion.   Skin:     General: Skin is warm.   Neurological:      General: No focal deficit present.      Mental Status: He is alert and oriented to person, place, and time.   Psychiatric:         Mood and Affect: Mood normal.          TIME SPENT ON DISCHARGE: 60 minutes    Discharge Medications        Medication List        START taking these medications      folic acid 1 MG tablet  Commonly known as: FOLVITE  Take 1 tablet (1 mg total) by mouth once daily.  Start taking on: September 5, 2023     guaiFENesin 600 mg 12 hr tablet  Commonly known as: MUCINEX  Take 1 tablet (600 mg total) by mouth 2 (two) times daily. for 10 days     multivitamin Tab  Take 1 tablet by mouth once daily.  Start taking on: September 5, 2023            CONTINUE taking these medications      albuterol 90 mcg/actuation inhaler  Commonly known as:  PROVENTIL/VENTOLIN HFA  Inhale 1-2 puffs into the lungs every 6 (six) hours as needed for Wheezing or Shortness of Breath. Rescue     levETIRAcetam 1000 MG tablet  Commonly known as: KEPPRA  Take 1 tablet (1,000 mg total) by mouth 2 (two) times daily.     metoprolol succinate 25 MG 24 hr tablet  Commonly known as: TOPROL-XL  Take 0.5 tablets (12.5 mg total) by mouth once daily.               Where to Get Your Medications        These medications were sent to [facility] - [facility] - [address]  [address], [place] [address]      Phone: [phone]   folic acid 1 MG tablet  guaiFENesin 600 mg 12 hr tablet  multivitamin Tab         Discharge Information:   Mr. Pierre Pierson is being discharged home.    Activity: Return to normal activity. Recommend returning to work in 2 days.  Diet: Regular Diet    Medications:   - Continue home medications as previously prescribed.  - Restart metoprolol succinate 12.5mg as prescribed by cardiology.    Follow Ups:   - Referral placed to cardiology.   - Follow up with PCP in 2 weeks.      The above information was discussed with the patient in clear terms. Patient was able to repeat the instructions to me in their own words. All questions answered. ED precautions provided.       Marie Magdaleno M.D  [facility] Family Medicine Resident, [place]

## 2023-09-04 NOTE — H&P
[facility] Wards History & Physical Note     Resident Team: [facility] Medicine List 2  Attending Physician: Cristian Quiñones MD    Date of Admit: 9/3/2023    Chief Complaint     Dizziness (Dizzy spells (x)1 hour. Also states this has been prevalent for 1 year. Vss. nadn)      Subjective:      History of Present Illness:  Pierre Pierson is a 63 y.o.male with a past medical history of COPD, seizure disorder, and CHFrEF (EF 35% on Echo 1/20233)  who presented to the ED from [facility] on 9/3/2023  with a primary complaint of Dizziness (Dizzy spells (x)1 hour. Also states this has been prevalent for 1 year. Vss. nadn)  . The patient reports that he was walking outside earlier today when he began to feel lightheaded and weak. He reports that his symptoms improved with sitting down. He denies any falls or LOC. He denies any headaches, vision changes, recent decreases in eating/drinking, nausea, vomiting, or diarrhea. He reports a history of a chronic cough with clear to green sputum production. He denies any recent chest pain or SOB.  In the ED the patient was afebrile. Pulse and RR were wnl. The patient was hypotensive to 80/63 but asymptomatic. O2 was 90s on RA. CMP was significant for a Cr 1.56, BUN 28.1, and Ca 10.4. CBC was non remarkable. CXR showed no acute cardiopulmonary process. CT head showed no acute intracranial process. The patient was given 2L NS in the ED and BP only increased to 95/69. The patient was admitted to observation for treatment of GAVI.      Past Medical History:  Past Medical History:   Diagnosis Date    Asthma     COPD (chronic obstructive pulmonary disease)     Seizures        Past Surgical History:  No past surgical history on file.    Family History:  No family history on file.    Social History:  Social History     Tobacco Use    Smoking status: Every Day     Current packs/day: 0.50     Types: Cigarettes    Smokeless tobacco: Never   Substance Use Topics    Alcohol use: Yes     Alcohol/week:  "2.0 standard drinks of alcohol     Types: 2 Cans of beer per week     Comment: daily (pint of vodka per day)    Drug use: Yes     Types: Marijuana     Comment: daily       Allergies:  Review of patient's allergies indicates:   Allergen Reactions    Keflex [cephalexin]        Home Medications:  Prior to Admission medications    Medication Sig Start Date End Date Taking? Authorizing Provider   albuterol (PROVENTIL/VENTOLIN HFA) 90 mcg/actuation inhaler Inhale 1-2 puffs into the lungs every 6 (six) hours as needed for Wheezing or Shortness of Breath. Rescue  Patient not taking: Reported on 2023  Delbert Fitzgerald MD   levETIRAcetam (KEPPRA) 1000 MG tablet Take 1 tablet (1,000 mg total) by mouth 2 (two) times daily. 23  Sukumar Field MD   metoprolol succinate (TOPROL-XL) 25 MG 24 hr tablet Take 0.5 tablets (12.5 mg total) by mouth once daily. 23  Miguel Magdaleno MD       Review of Systems   Constitutional:  Negative for chills, fever and malaise/fatigue.   HENT:  Positive for congestion. Negative for sore throat.    Respiratory:  Positive for cough and sputum production. Negative for shortness of breath and wheezing.    Cardiovascular:  Negative for chest pain, palpitations, orthopnea and leg swelling.   Gastrointestinal:  Negative for abdominal pain, nausea and vomiting.   Musculoskeletal:  Negative for falls.   Neurological:  Positive for dizziness and weakness. Negative for seizures and headaches.      Objective:   Last 24 Hour Vital Signs:  BP  Min: 80/63  Max: 118/70  Temp  Av.5 °F (36.9 °C)  Min: 98.5 °F (36.9 °C)  Max: 98.5 °F (36.9 °C)  Pulse  Av.7  Min: 60  Max: 111  Resp  Av.5  Min: 16  Max: 33  SpO2  Av.7 %  Min: 92 %  Max: 98 %  Height  Av' 8" (172.7 cm)  Min: 5' 8" (172.7 cm)  Max: 5' 8" (172.7 cm)  Weight  Av kg (139 lb)  Min: 63 kg (139 lb)  Max: 63 kg (139 lb)  Body mass index is 21.13 kg/m².  No intake/output data " recorded.    Physical Exam  Vitals reviewed.   Constitutional:       General: He is not in acute distress.     Appearance: He is not ill-appearing or toxic-appearing.   HENT:      Nose: Septal deviation present.      Mouth/Throat:      Mouth: Mucous membranes are moist.      Dentition: Abnormal dentition (No denitition visible in mouth, no dentures).      Pharynx: Oropharynx is clear.   Neck:      Vascular: No carotid bruit.   Cardiovascular:      Rate and Rhythm: Normal rate and regular rhythm.      Heart sounds: No murmur heard.     No friction rub. No gallop.   Pulmonary:      Effort: No respiratory distress.      Breath sounds: Decreased air movement present. Examination of the left-lower field reveals wheezing. Wheezing present.   Abdominal:      General: Abdomen is flat. Bowel sounds are normal. There is no distension.      Palpations: Abdomen is soft.      Tenderness: There is no abdominal tenderness. There is no guarding.      Comments: Surgical scar present in RUQ from previous G tube   Musculoskeletal:      Right lower leg: No edema.      Left lower leg: No edema.   Skin:     General: Skin is warm.      Capillary Refill: Capillary refill takes 2 to 3 seconds.      Coloration: Skin is not pale.   Neurological:      Mental Status: He is alert.       Laboratory:  Most Recent Data:  CBC:   Lab Results   Component Value Date    WBC 8.42 09/03/2023    HGB 14.9 09/03/2023    HCT 47.4 09/03/2023     09/03/2023    MCV 93.1 09/03/2023    RDW 13.0 09/03/2023     WBC Differential:   Recent Labs   Lab 09/03/23  1738   WBC 8.42   HGB 14.9   HCT 47.4      MCV 93.1     BMP:   Lab Results   Component Value Date     09/03/2023    K 4.8 09/03/2023     12/29/2022    CO2 30 09/03/2023    BUN 28.1 (H) 09/03/2023    CREATININE 1.56 (H) 09/03/2023    GLU 82 12/29/2022    CALCIUM 10.4 (H) 09/03/2023    MG 2.10 06/04/2023    PHOS 4.2 02/02/2023     LFTs:   Lab Results   Component Value Date    PROT 6.4  "12/29/2022    ALBUMIN 4.7 09/03/2023    BILITOT 0.9 09/03/2023    AST 15 09/03/2023    ALKPHOS 75 09/03/2023    ALT 9 09/03/2023     Coags:   Lab Results   Component Value Date    INR 1.0 12/29/2022     FLP:   Lab Results   Component Value Date    CHOL 145 06/04/2023    HDL 37 06/04/2023    TRIG 53 06/04/2023     DM:   Lab Results   Component Value Date    HGBA1C 4.9 01/28/2023    CREATININE 1.56 (H) 09/03/2023     Thyroid:   Lab Results   Component Value Date    TSH 1.107 06/04/2023      Anemia: No results found for: "IRON", "TIBC", "FERRITIN", "SATURATEDIRO"  No results found for: "SETKFFCP45"  No results found for: "FOLATE"     Cardiac:   Lab Results   Component Value Date    TROPONINI 0.012 09/03/2023    BNP 63.9 01/28/2023     Urinalysis:   Lab Results   Component Value Date    COLORU Pale Yellow 04/24/2021    PHUA 7.0 06/04/2023    SPECGRAV 1.020 01/11/2021    NITRITE Negative 01/11/2021    KETONESU 1+ (A) 01/11/2021    UROBILINOGEN Normal 06/04/2023    WBCUA None Seen 06/04/2023       Trended Lab Data:  Recent Labs   Lab 09/03/23  1738   WBC 8.42   HGB 14.9   HCT 47.4      MCV 93.1   RDW 13.0      K 4.8   CO2 30   BUN 28.1*   CREATININE 1.56*   ALBUMIN 4.7   BILITOT 0.9   AST 15   ALKPHOS 75   ALT 9       Trended Cardiac Data:  Recent Labs   Lab 09/03/23  1900   TROPONINI 0.012       Microbiology Data:  Microbiology Results (last 7 days)       ** No results found for the last 168 hours. **             Other Results:  EKG (my interpretation): RBBB    Radiology:  Imaging Results    None          Assessment & Plan:     1) GAVI       - Patient presented to the ED with Cr 1.56 (baseline <1). Likely pre renal in origin 2/2 to patient hypotension.        - Patient given 2L NS in ED       - Given hx of HFrEF fluid resuscitate slowly and reassess for volume overload       - Maintenance fluids 100 cc/hr NS    2) COPD        - Patient has a hx of chronic bronchitis with persistent productive cough. PE in ED " showed decreased air movement and faint lower lobe wheezing       - Unable to stage 2/2 to no available PFTs       - Hx of home albuterol inhaler, currently not taking.        - Duonebs q6h while awake, guaifenesin 600 BID    3) CHrEF (EF 35% on Echo 1/20233) with systolic and diastolic dysfunction       - Last seen by cardiology 1/2023 inpatient with positive stress test. Recommended GDMT at that time but unable to tolerate 2/2 BP. Recommended outpatient follow up in 4-6 weeks but patient has not followed up       - Per chart review hx of paroxysmal atrial fibrillation. Normal sinus on last 2 EKGs        - Prescribed metoprolol 12.5 mg in 1/2023. Currently not taking.       - Patient will need outpatient follow up with clinic to establish PCP and outpatient cardiology follow up.       - Continue to monitor BP and consider restarting metoprolol prior to discharge.       - Continuous cardiac monitoring       - Repeat CMP, Mag, Phos in AM, will replete electrolytes to goal    Hx of Alcohol and Substance Abuse       - Currently reports drinking as approximately 1-2 oz of liquor daily. Reports some marijuana but denies any other illicit drug use or IV drug use.       - CIWA protocol in place       - Folate, vit B12 labs pending       - Start multivitamin, folate, thiamine        - UDS pending    CODE STATUS: Full  Access: PIV  Antibiotics: N/A  Diet: Adult cardiac  DVT Prophylaxis: SCDs  GI Prophylaxis: None  Fluids: 100 cc/hr NS      Disposition: Admit to observation for IVF and consulted case management 2/2 patient homeless and currently living in [facility]      Harrison Altamirano MD  [facility]

## 2023-09-04 NOTE — PLAN OF CARE
09/04/23 0837   Discharge Assessment   Assessment Type Discharge Planning Assessment   Confirmed/corrected address, phone number and insurance Yes   Confirmed Demographics Correct on Facesheet   Source of Information patient;health record   Reason For Admission Dizziness  R07.9 Chest pain  R55 Near syncope  I95.9 Hypotension, unspecified hypotension type   [facility] facility resident   Facility Arrived From: Resides at [facility]   Do you expect to return to your current living situation? Yes   Do you have help at home or someone to help you manage your care at home? Yes   Who are your caregiver(s) and their phone number(s)? YAMIL SILVERMAN (Brother)   [phone]; [facility] ([phone])   Prior to hospitilization cognitive status: Alert/Oriented   Current cognitive status: Alert/Oriented   Equipment Currently Used at Home none   Readmission within 30 days? No   Patient currently being followed by outpatient case management? No   Do you currently have service(s) that help you manage your care at home? No   Do you take prescription medications? Yes   Do you have prescription coverage? Yes   Coverage [employer] M/D   Do you have any problems affording any of your prescribed medications? No   Is the patient taking medications as prescribed? yes   Who is going to help you get home at discharge? Cab   How do you get to doctors appointments? other (see comments)  (Walks)   DME Needed Upon Discharge  none   Discharge Plan discussed with: Patient   Transition of Care Barriers Homeless;Substance Abuse   Discharge Plan A Shelter   OTHER   Name(s) of People in Home Shelter residents     Pt resident of [facility] where he plans to return; Independent with ADL's; Emergency contact is brotherYamil ([phone]), however, pt unable to stay with brother who cares for ill spouse; Updated demographic information with pt's new phone # ([phone]) Pt receives SS Disability &  SNAP benefits - Account at [facility]; As at previous hosp admission, pt will need transportation back to shelter upon discharge.

## 2023-09-07 LAB — PATH REV: NORMAL

## 2023-10-02 ENCOUNTER — HOSPITAL ENCOUNTER (EMERGENCY)
Facility: HOSPITAL | Age: 63
Discharge: HOME | End: 2023-10-02
Attending: STUDENT IN AN ORGANIZED HEALTH CARE EDUCATION/TRAINING PROGRAM
Payer: MEDICAID

## 2023-10-02 VITALS
SYSTOLIC BLOOD PRESSURE: 112 MMHG | BODY MASS INDEX: 21.35 KG/M2 | TEMPERATURE: 98 F | RESPIRATION RATE: 18 BRPM | DIASTOLIC BLOOD PRESSURE: 73 MMHG | HEIGHT: 68 IN | WEIGHT: 140.88 LBS | HEART RATE: 73 BPM | OXYGEN SATURATION: 100 %

## 2023-10-02 DIAGNOSIS — Z76.0 MEDICATION REFILL: Primary | ICD-10-CM

## 2023-10-02 DIAGNOSIS — G40.909 SEIZURE DISORDER: ICD-10-CM

## 2023-10-02 PROCEDURE — 99281 EMR DPT VST MAYX REQ PHY/QHP: CPT

## 2023-10-02 RX ORDER — LEVETIRACETAM 1000 MG/1
1000 TABLET ORAL 2 TIMES DAILY
Qty: 60 TABLET | Refills: 1 | Status: SHIPPED | OUTPATIENT
Start: 2023-10-02 | End: 2023-10-19 | Stop reason: SDUPTHER

## 2023-10-02 NOTE — ED PROVIDER NOTES
Encounter Date: 10/2/2023       History     Chief Complaint   Patient presents with    Medication Refill     Requesting Keppra med refill.     Patient reports to the emergency room requesting medication refills; patient has no complaints at this time and reports no new seizure activity    The history is provided by the patient.   Medication Refill  This is a recurrent problem. Pertinent negatives include no chest pain, no abdominal pain, no headaches and no shortness of breath.     Review of patient's allergies indicates:   Allergen Reactions    Keflex [cephalexin]      Past Medical History:   Diagnosis Date    Asthma     COPD (chronic obstructive pulmonary disease)     Seizures      No past surgical history on file.  No family history on file.  Social History     Tobacco Use    Smoking status: Every Day     Current packs/day: 0.50     Types: Cigarettes    Smokeless tobacco: Never   Substance Use Topics    Alcohol use: Yes     Alcohol/week: 2.0 standard drinks of alcohol     Types: 2 Cans of beer per week     Comment: daily (pint of vodka per day)    Drug use: Yes     Types: Marijuana     Comment: daily     Review of Systems   Constitutional:  Negative for fever.   HENT:  Negative for sore throat.    Respiratory:  Negative for shortness of breath.    Cardiovascular:  Negative for chest pain.   Gastrointestinal:  Negative for abdominal pain and nausea.   Genitourinary:  Negative for dysuria.   Musculoskeletal:  Negative for back pain.   Skin:  Negative for rash.   Neurological:  Negative for weakness and headaches.   Hematological:  Does not bruise/bleed easily.   Psychiatric/Behavioral: Negative.         Physical Exam     Initial Vitals [10/02/23 1008]   BP Pulse Resp Temp SpO2   99/72 75 16 97.6 °F (36.4 °C) 100 %      MAP       --         Physical Exam    Vitals reviewed.  Constitutional: He appears well-developed and well-nourished.   HENT:   Head: Normocephalic and atraumatic.   Eyes: Conjunctivae and EOM are  normal. Pupils are equal, round, and reactive to light.   Neck:   Normal range of motion.  Cardiovascular:  Normal rate, regular rhythm, normal heart sounds and intact distal pulses.           Pulmonary/Chest: Breath sounds normal. He exhibits no tenderness.   Abdominal: Abdomen is soft. Bowel sounds are normal. He exhibits no distension. There is no abdominal tenderness.   Musculoskeletal:         General: Normal range of motion.      Cervical back: Normal range of motion.     Neurological: He is alert and oriented to person, place, and time. He displays normal reflexes. No cranial nerve deficit or sensory deficit. GCS score is 15. GCS eye subscore is 4. GCS verbal subscore is 5. GCS motor subscore is 6.   Skin: Skin is warm. No pallor.   Psychiatric: He has a normal mood and affect. His behavior is normal. Judgment and thought content normal.         ED Course   Procedures  Labs Reviewed - No data to display       Imaging Results    None          Medications - No data to display  Medical Decision Making  I obtained a appointment for the patient to have an internal medicine follow up later this month; patient given paper with time and date    Risk  Risk Details:   Given strict ED return precautions. I have spoken with the patient and/or caregivers. I have explained the patient's condition, diagnoses and treatment plan based on the information available to me at this time. I have answered the patient's and/or caregiver's questions and addressed any concerns. The patient and/or caregivers have as good an understanding of the patient's diagnosis, condition and treatment plan as can be expected at this point. The vital signs have been stable. The patient's condition is stable and appropriate for discharge from the emergency department.      The patient will pursue further outpatient evaluation with the primary care physician or other designated or consulting physician as outlined in the discharge instructions. The  patient and/or caregivers are agreeable to this plan of care and follow-up instructions have been explained in detail. The patient and/or caregivers have received these instructions in written format and have expressed an understanding of the discharge instructions. The patient and/or caregivers are aware that any significant change in condition or worsening of symptoms should prompt an immediate return to this or the closest emergency department or a call to 911.                                 Clinical Impression:   Final diagnoses:  [Z76.0] Medication refill (Primary)  [G40.909] Seizure disorder        ED Disposition Condition    Discharge Stable          ED Prescriptions       Medication Sig Dispense Start Date End Date Auth. Provider    levETIRAcetam (KEPPRA) 1000 MG tablet Take 1 tablet (1,000 mg total) by mouth 2 (two) times daily. 60 tablet 10/2/2023 12/1/2023 Rey Thompson PA          Follow-up Information       Follow up With Specialties Details Why Contact Info    discharge followup    If your symptoms become WORSE or you DO NOT IMPROVE and you are unable to reach your health care provider, you should RETURN to the emergency department    discharge info    Discussed all pertinent ED information, results, diagnosis and treatment plan; All questions and concerns were addressed at this time. Patient voices understanding of information and instructions. Patient is comfortable with plan and discharge             Rey Thompson PA  10/02/23 1031

## 2023-10-05 ENCOUNTER — PATIENT OUTREACH (OUTPATIENT)
Dept: EMERGENCY MEDICINE | Facility: HOSPITAL | Age: 63
End: 2023-10-05
Payer: MEDICAID

## 2023-10-10 NOTE — PROGRESS NOTES
Spoke to patient for ED navigation call. Has meds. Aware of appt to est care in Oct 2023 at [facility], states will attend. Will mail appt at his request. Discussed importance of attending appt. Denies issues with food/utility/transp/housing. Denies further f/u calls, episode closed.

## 2023-10-19 ENCOUNTER — HOSPITAL ENCOUNTER (EMERGENCY)
Facility: HOSPITAL | Age: 63
Discharge: HOME | End: 2023-10-19
Attending: INTERNAL MEDICINE
Payer: MEDICAID

## 2023-10-19 ENCOUNTER — HOSPITAL ENCOUNTER (EMERGENCY)
Facility: HOSPITAL | Age: 63
Discharge: HOME | End: 2023-10-19
Attending: EMERGENCY MEDICINE
Payer: MEDICAID

## 2023-10-19 VITALS
HEART RATE: 72 BPM | OXYGEN SATURATION: 95 % | TEMPERATURE: 98 F | RESPIRATION RATE: 20 BRPM | DIASTOLIC BLOOD PRESSURE: 70 MMHG | SYSTOLIC BLOOD PRESSURE: 98 MMHG

## 2023-10-19 VITALS
HEIGHT: 68 IN | TEMPERATURE: 98 F | SYSTOLIC BLOOD PRESSURE: 92 MMHG | OXYGEN SATURATION: 93 % | HEART RATE: 70 BPM | DIASTOLIC BLOOD PRESSURE: 71 MMHG | WEIGHT: 140.31 LBS | RESPIRATION RATE: 20 BRPM | BODY MASS INDEX: 21.26 KG/M2

## 2023-10-19 DIAGNOSIS — Z76.0 MEDICATION REFILL: Primary | ICD-10-CM

## 2023-10-19 DIAGNOSIS — I95.9 HYPOTENSION: ICD-10-CM

## 2023-10-19 DIAGNOSIS — Z87.898 HISTORY OF SEIZURE: ICD-10-CM

## 2023-10-19 DIAGNOSIS — J18.1 RIGHT LOWER LOBE CONSOLIDATION: ICD-10-CM

## 2023-10-19 DIAGNOSIS — G40.909 SEIZURE DISORDER: Primary | ICD-10-CM

## 2023-10-19 LAB
ALBUMIN SERPL-MCNC: 4.3 G/DL (ref 3.4–4.8)
ALBUMIN/GLOB SERPL: 1.5 RATIO (ref 1.1–2)
ALP SERPL-CCNC: 68 UNIT/L (ref 40–150)
ALT SERPL-CCNC: 9 UNIT/L (ref 0–55)
AST SERPL-CCNC: 18 UNIT/L (ref 5–34)
BASOPHILS # BLD AUTO: 0.04 X10(3)/MCL
BASOPHILS NFR BLD AUTO: 0.6 %
BILIRUB SERPL-MCNC: 1 MG/DL
BNP BLD-MCNC: 76 PG/ML
BUN SERPL-MCNC: 18 MG/DL (ref 8.4–25.7)
CALCIUM SERPL-MCNC: 9.7 MG/DL (ref 8.8–10)
CHLORIDE SERPL-SCNC: 105 MMOL/L (ref 98–107)
CO2 SERPL-SCNC: 29 MMOL/L (ref 23–31)
CREAT SERPL-MCNC: 0.96 MG/DL (ref 0.73–1.18)
EOSINOPHIL # BLD AUTO: 0.04 X10(3)/MCL (ref 0–0.9)
EOSINOPHIL NFR BLD AUTO: 0.6 %
ERYTHROCYTE [DISTWIDTH] IN BLOOD BY AUTOMATED COUNT: 13.1 % (ref 11.5–17)
GFR SERPLBLD CREATININE-BSD FMLA CKD-EPI: >60 MLS/MIN/1.73/M2
GLOBULIN SER-MCNC: 2.9 GM/DL (ref 2.4–3.5)
GLUCOSE SERPL-MCNC: 85 MG/DL (ref 82–115)
HCT VFR BLD AUTO: 43.6 % (ref 42–52)
HGB BLD-MCNC: 13.8 G/DL (ref 14–18)
IMM GRANULOCYTES # BLD AUTO: 0.02 X10(3)/MCL (ref 0–0.04)
IMM GRANULOCYTES NFR BLD AUTO: 0.3 %
LYMPHOCYTES # BLD AUTO: 1.4 X10(3)/MCL (ref 0.6–4.6)
LYMPHOCYTES NFR BLD AUTO: 20.7 %
MAGNESIUM SERPL-MCNC: 2 MG/DL (ref 1.6–2.6)
MCH RBC QN AUTO: 29.4 PG (ref 27–31)
MCHC RBC AUTO-ENTMCNC: 31.7 G/DL (ref 33–36)
MCV RBC AUTO: 92.8 FL (ref 80–94)
MONOCYTES # BLD AUTO: 0.43 X10(3)/MCL (ref 0.1–1.3)
MONOCYTES NFR BLD AUTO: 6.4 %
NEUTROPHILS # BLD AUTO: 4.82 X10(3)/MCL (ref 2.1–9.2)
NEUTROPHILS NFR BLD AUTO: 71.4 %
NRBC BLD AUTO-RTO: 0 %
PLATELET # BLD AUTO: 260 X10(3)/MCL (ref 130–400)
PMV BLD AUTO: 9.7 FL (ref 7.4–10.4)
POTASSIUM SERPL-SCNC: 4.4 MMOL/L (ref 3.5–5.1)
PROT SERPL-MCNC: 7.2 GM/DL (ref 5.8–7.6)
RBC # BLD AUTO: 4.7 X10(6)/MCL (ref 4.7–6.1)
SODIUM SERPL-SCNC: 144 MMOL/L (ref 136–145)
TROPONIN I SERPL-MCNC: 0.01 NG/ML (ref 0–0.04)
TSH SERPL-ACNC: 1.47 UIU/ML (ref 0.35–4.94)
WBC # SPEC AUTO: 6.75 X10(3)/MCL (ref 4.5–11.5)

## 2023-10-19 PROCEDURE — 84484 ASSAY OF TROPONIN QUANT: CPT | Performed by: PHYSICIAN ASSISTANT

## 2023-10-19 PROCEDURE — 99283 EMERGENCY DEPT VISIT LOW MDM: CPT | Mod: 25

## 2023-10-19 PROCEDURE — 96361 HYDRATE IV INFUSION ADD-ON: CPT

## 2023-10-19 PROCEDURE — 25000003 PHARM REV CODE 250: Performed by: INTERNAL MEDICINE

## 2023-10-19 PROCEDURE — 63600175 PHARM REV CODE 636 W HCPCS: Performed by: PHYSICIAN ASSISTANT

## 2023-10-19 PROCEDURE — 80053 COMPREHEN METABOLIC PANEL: CPT | Performed by: PHYSICIAN ASSISTANT

## 2023-10-19 PROCEDURE — 83735 ASSAY OF MAGNESIUM: CPT | Performed by: PHYSICIAN ASSISTANT

## 2023-10-19 PROCEDURE — 83880 ASSAY OF NATRIURETIC PEPTIDE: CPT | Performed by: PHYSICIAN ASSISTANT

## 2023-10-19 PROCEDURE — 84443 ASSAY THYROID STIM HORMONE: CPT | Performed by: PHYSICIAN ASSISTANT

## 2023-10-19 PROCEDURE — 99285 EMERGENCY DEPT VISIT HI MDM: CPT | Mod: 25,27

## 2023-10-19 PROCEDURE — 96374 THER/PROPH/DIAG INJ IV PUSH: CPT

## 2023-10-19 PROCEDURE — 25000003 PHARM REV CODE 250: Performed by: PHYSICIAN ASSISTANT

## 2023-10-19 PROCEDURE — 93005 ELECTROCARDIOGRAM TRACING: CPT

## 2023-10-19 PROCEDURE — 85025 COMPLETE CBC W/AUTO DIFF WBC: CPT | Performed by: PHYSICIAN ASSISTANT

## 2023-10-19 RX ORDER — LEVETIRACETAM 500 MG/1
1000 TABLET ORAL 2 TIMES DAILY
Status: DISCONTINUED | OUTPATIENT
Start: 2023-10-19 | End: 2023-10-19 | Stop reason: HOSPADM

## 2023-10-19 RX ORDER — AZITHROMYCIN 250 MG/1
TABLET, FILM COATED ORAL
Qty: 6 TABLET | Refills: 0 | Status: SHIPPED | OUTPATIENT
Start: 2023-10-19 | End: 2023-10-24

## 2023-10-19 RX ORDER — LEVETIRACETAM 1000 MG/1
1000 TABLET ORAL 2 TIMES DAILY
Qty: 60 TABLET | Refills: 2 | OUTPATIENT
Start: 2023-10-19 | End: 2023-10-28

## 2023-10-19 RX ORDER — ALBUTEROL SULFATE 90 UG/1
1-2 AEROSOL, METERED RESPIRATORY (INHALATION) EVERY 6 HOURS PRN
Qty: 18 G | Refills: 2 | Status: SHIPPED | OUTPATIENT
Start: 2023-10-19 | End: 2024-10-18

## 2023-10-19 RX ORDER — LEVETIRACETAM 10 MG/ML
1000 INJECTION INTRAVASCULAR
Status: COMPLETED | OUTPATIENT
Start: 2023-10-19 | End: 2023-10-19

## 2023-10-19 RX ORDER — SODIUM CHLORIDE 9 MG/ML
500 INJECTION, SOLUTION INTRAVENOUS
Status: COMPLETED | OUTPATIENT
Start: 2023-10-19 | End: 2023-10-19

## 2023-10-19 RX ORDER — FOLIC ACID 1 MG/1
1 TABLET ORAL DAILY
Qty: 30 TABLET | Refills: 3 | Status: SHIPPED | OUTPATIENT
Start: 2023-10-19 | End: 2024-02-16

## 2023-10-19 RX ADMIN — LEVETIRACETAM INJECTION 1000 MG: 10 INJECTION INTRAVENOUS at 08:10

## 2023-10-19 RX ADMIN — LEVETIRACETAM 1000 MG: 500 TABLET, FILM COATED ORAL at 04:10

## 2023-10-19 RX ADMIN — SODIUM CHLORIDE 500 ML: 9 INJECTION, SOLUTION INTRAVENOUS at 09:10
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
## 2023-10-20 NOTE — ED NOTES
"Went into patient's room to take out IV. Patient began cussing me out stating, "this fucking piece of [facility]. Y'all are a joke. Y'all didn't do shit for me." Informed patient that the provider taking care of him and myself have treated him fairly and did everything medical that we could at this time. Patient began throwing arms around, stating, "y'all just want me out of here. Fuck you." Due to feeling unsafe at this time, I called for [employer] and charge nurse to come to the bedside. Patient stopped flailing at this time, but shoved his arm into my side and stated, "you take take this shit out now", in regards to his IV. IV removed. Patient was then escorted out by [employer] at this time. Patient refused discharge paperwork and prescription at this time. Patient A&O x4, no difficulty breathing and steady gait.  "

## 2023-10-20 NOTE — DISCHARGE INSTRUCTIONS
Report to Emergency Department if symptoms return or worsen; [facility] Within 1 to 2 days, It is important that you follow up with your primary care provider or specialist if indicated for further evaluation, workup, and treatment as necessary. The exam and treatment you received in Emergency Department was for an urgent problem and NOT INTENDED AS COMPLETE CARE. It is important that you FOLLOW UP with a doctor for ongoing care. If your symptoms become WORSE or you DO NOT IMPROVE and you are unable to reach your health care provider, you should RETURN to the Emergency Department. The Emergency Department provider has provided a PRELIMINARY INTERPRETATION of all your studies. A final interpretation may be done after you are discharged. If a change in your diagnosis or treatment is needed WE WILL CONTACT YOU. It is critical that we have a CURRENT PHONE NUMBER FOR YOU.
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
## 2023-10-28 ENCOUNTER — HOSPITAL ENCOUNTER (EMERGENCY)
Facility: HOSPITAL | Age: 63
Discharge: HOME | End: 2023-10-28
Attending: EMERGENCY MEDICINE
Payer: MEDICAID

## 2023-10-28 VITALS
OXYGEN SATURATION: 93 % | BODY MASS INDEX: 20.46 KG/M2 | DIASTOLIC BLOOD PRESSURE: 70 MMHG | HEIGHT: 68 IN | HEART RATE: 68 BPM | WEIGHT: 135 LBS | SYSTOLIC BLOOD PRESSURE: 93 MMHG | RESPIRATION RATE: 16 BRPM

## 2023-10-28 DIAGNOSIS — F19.10 POLYSUBSTANCE ABUSE: ICD-10-CM

## 2023-10-28 DIAGNOSIS — Z91.199 MEDICALLY NONCOMPLIANT: ICD-10-CM

## 2023-10-28 DIAGNOSIS — F10.920 ALCOHOLIC INTOXICATION WITHOUT COMPLICATION: ICD-10-CM

## 2023-10-28 DIAGNOSIS — G40.909 SEIZURE DISORDER: Primary | ICD-10-CM

## 2023-10-28 LAB
ALBUMIN SERPL-MCNC: 3.9 G/DL (ref 3.4–4.8)
ALBUMIN/GLOB SERPL: 1.6 RATIO (ref 1.1–2)
ALP SERPL-CCNC: 61 UNIT/L (ref 40–150)
ALT SERPL-CCNC: 15 UNIT/L (ref 0–55)
AST SERPL-CCNC: 28 UNIT/L (ref 5–34)
BASOPHILS # BLD AUTO: 0.05 X10(3)/MCL
BASOPHILS NFR BLD AUTO: 0.8 %
BILIRUB SERPL-MCNC: 0.7 MG/DL
BUN SERPL-MCNC: 9.3 MG/DL (ref 8.4–25.7)
CALCIUM SERPL-MCNC: 8.2 MG/DL (ref 8.8–10)
CHLORIDE SERPL-SCNC: 107 MMOL/L (ref 98–107)
CO2 SERPL-SCNC: 28 MMOL/L (ref 23–31)
CREAT SERPL-MCNC: 0.82 MG/DL (ref 0.73–1.18)
EOSINOPHIL # BLD AUTO: 0.05 X10(3)/MCL (ref 0–0.9)
EOSINOPHIL NFR BLD AUTO: 0.8 %
ERYTHROCYTE [DISTWIDTH] IN BLOOD BY AUTOMATED COUNT: 13.2 % (ref 11.5–17)
ETHANOL SERPL-MCNC: 215 MG/DL
GFR SERPLBLD CREATININE-BSD FMLA CKD-EPI: >60 MLS/MIN/1.73/M2
GLOBULIN SER-MCNC: 2.4 GM/DL (ref 2.4–3.5)
GLUCOSE SERPL-MCNC: 67 MG/DL (ref 82–115)
HCT VFR BLD AUTO: 40.4 % (ref 42–52)
HGB BLD-MCNC: 13 G/DL (ref 14–18)
IMM GRANULOCYTES # BLD AUTO: 0.01 X10(3)/MCL (ref 0–0.04)
IMM GRANULOCYTES NFR BLD AUTO: 0.2 %
LYMPHOCYTES # BLD AUTO: 1.8 X10(3)/MCL (ref 0.6–4.6)
LYMPHOCYTES NFR BLD AUTO: 28.4 %
MAGNESIUM SERPL-MCNC: 1.8 MG/DL (ref 1.6–2.6)
MCH RBC QN AUTO: 30.4 PG (ref 27–31)
MCHC RBC AUTO-ENTMCNC: 32.2 G/DL (ref 33–36)
MCV RBC AUTO: 94.4 FL (ref 80–94)
MONOCYTES # BLD AUTO: 0.4 X10(3)/MCL (ref 0.1–1.3)
MONOCYTES NFR BLD AUTO: 6.3 %
NEUTROPHILS # BLD AUTO: 4.03 X10(3)/MCL (ref 2.1–9.2)
NEUTROPHILS NFR BLD AUTO: 63.5 %
NRBC BLD AUTO-RTO: 0 %
PHENYTOIN SERPL-MCNC: <1.8 UG/ML (ref 10–20)
PLATELET # BLD AUTO: 203 X10(3)/MCL (ref 130–400)
PMV BLD AUTO: 9.8 FL (ref 7.4–10.4)
POTASSIUM SERPL-SCNC: 4 MMOL/L (ref 3.5–5.1)
PROT SERPL-MCNC: 6.3 GM/DL (ref 5.8–7.6)
RBC # BLD AUTO: 4.28 X10(6)/MCL (ref 4.7–6.1)
SODIUM SERPL-SCNC: 145 MMOL/L (ref 136–145)
TROPONIN I SERPL-MCNC: 0.02 NG/ML (ref 0–0.04)
WBC # SPEC AUTO: 6.34 X10(3)/MCL (ref 4.5–11.5)

## 2023-10-28 PROCEDURE — 63600175 PHARM REV CODE 636 W HCPCS: Performed by: EMERGENCY MEDICINE

## 2023-10-28 PROCEDURE — 80185 ASSAY OF PHENYTOIN TOTAL: CPT | Performed by: EMERGENCY MEDICINE

## 2023-10-28 PROCEDURE — 80053 COMPREHEN METABOLIC PANEL: CPT | Performed by: EMERGENCY MEDICINE

## 2023-10-28 PROCEDURE — 99284 EMERGENCY DEPT VISIT MOD MDM: CPT | Mod: 25

## 2023-10-28 PROCEDURE — 82077 ASSAY SPEC XCP UR&BREATH IA: CPT | Performed by: EMERGENCY MEDICINE

## 2023-10-28 PROCEDURE — 96374 THER/PROPH/DIAG INJ IV PUSH: CPT

## 2023-10-28 PROCEDURE — 83735 ASSAY OF MAGNESIUM: CPT | Performed by: EMERGENCY MEDICINE

## 2023-10-28 PROCEDURE — 84484 ASSAY OF TROPONIN QUANT: CPT | Performed by: EMERGENCY MEDICINE

## 2023-10-28 PROCEDURE — 96361 HYDRATE IV INFUSION ADD-ON: CPT

## 2023-10-28 PROCEDURE — 85025 COMPLETE CBC W/AUTO DIFF WBC: CPT | Performed by: EMERGENCY MEDICINE

## 2023-10-28 RX ORDER — LEVETIRACETAM 10 MG/ML
1000 INJECTION INTRAVASCULAR
Status: COMPLETED | OUTPATIENT
Start: 2023-10-28 | End: 2023-10-28

## 2023-10-28 RX ORDER — LEVETIRACETAM 1000 MG/1
1000 TABLET ORAL 2 TIMES DAILY
Qty: 180 TABLET | Refills: 3 | Status: SHIPPED | OUTPATIENT
Start: 2023-10-28 | End: 2024-10-27

## 2023-10-28 RX ORDER — PHENYTOIN SODIUM 100 MG/1
300 CAPSULE, EXTENDED RELEASE ORAL NIGHTLY
Qty: 270 CAPSULE | Refills: 3 | Status: SHIPPED | OUTPATIENT
Start: 2023-10-28 | End: 2024-10-27

## 2023-10-28 RX ADMIN — LEVETIRACETAM INJECTION 1000 MG: 10 INJECTION INTRAVENOUS at 02:10

## 2023-10-28 RX ADMIN — SODIUM CHLORIDE, POTASSIUM CHLORIDE, SODIUM LACTATE AND CALCIUM CHLORIDE 1000 ML: 600; 310; 30; 20 INJECTION, SOLUTION INTRAVENOUS at 02:10

## 2023-10-28 NOTE — ED NOTES
Maribell with [facility] notified.  She informed me that they would take pt back.  She also informed me to instruct pt that they would take him back, but inform him not to do what he did.
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
## 2023-11-07 ENCOUNTER — HOSPITAL ENCOUNTER (OUTPATIENT)
Dept: RADIOLOGY | Facility: HOSPITAL | Age: 63
Discharge: HOME | End: 2023-11-07
Attending: NURSE PRACTITIONER
Payer: MEDICAID

## 2023-11-07 ENCOUNTER — OFFICE VISIT (OUTPATIENT)
Dept: URGENT CARE | Facility: CLINIC | Age: 63
End: 2023-11-07
Payer: MEDICAID

## 2023-11-07 VITALS
RESPIRATION RATE: 18 BRPM | WEIGHT: 134 LBS | HEIGHT: 68 IN | SYSTOLIC BLOOD PRESSURE: 113 MMHG | BODY MASS INDEX: 20.31 KG/M2 | OXYGEN SATURATION: 94 % | DIASTOLIC BLOOD PRESSURE: 76 MMHG | TEMPERATURE: 99 F | HEART RATE: 86 BPM

## 2023-11-07 DIAGNOSIS — J00 ACUTE RHINITIS: ICD-10-CM

## 2023-11-07 DIAGNOSIS — J10.1: Primary | ICD-10-CM

## 2023-11-07 DIAGNOSIS — R05.8 PRODUCTIVE COUGH: ICD-10-CM

## 2023-11-07 DIAGNOSIS — R05.1 ACUTE COUGH: ICD-10-CM

## 2023-11-07 LAB
CTP QC/QA: YES
MOLECULAR STREP A: NEGATIVE
POC MOLECULAR INFLUENZA A AGN: POSITIVE
POC MOLECULAR INFLUENZA B AGN: NEGATIVE
SARS-COV-2 RDRP RESP QL NAA+PROBE: NEGATIVE

## 2023-11-07 PROCEDURE — 87502 INFLUENZA DNA AMP PROBE: CPT | Mod: PBBFAC | Performed by: NURSE PRACTITIONER

## 2023-11-07 PROCEDURE — 87635 SARS-COV-2 COVID-19 AMP PRB: CPT | Mod: PBBFAC | Performed by: NURSE PRACTITIONER

## 2023-11-07 PROCEDURE — 99215 OFFICE O/P EST HI 40 MIN: CPT | Mod: PBBFAC,25 | Performed by: NURSE PRACTITIONER

## 2023-11-07 PROCEDURE — 87651 STREP A DNA AMP PROBE: CPT | Mod: PBBFAC | Performed by: NURSE PRACTITIONER

## 2023-11-07 PROCEDURE — 71046 X-RAY EXAM CHEST 2 VIEWS: CPT | Mod: TC

## 2023-11-07 PROCEDURE — 99214 PR OFFICE/OUTPT VISIT, EST, LEVL IV, 30-39 MIN: ICD-10-PCS | Mod: S$PBB,,, | Performed by: NURSE PRACTITIONER

## 2023-11-07 PROCEDURE — 99214 OFFICE O/P EST MOD 30 MIN: CPT | Mod: S$PBB,,, | Performed by: NURSE PRACTITIONER

## 2023-11-07 RX ORDER — FLUTICASONE PROPIONATE 50 MCG
2 SPRAY, SUSPENSION (ML) NASAL DAILY
Qty: 16 G | Refills: 0 | Status: SHIPPED | OUTPATIENT
Start: 2023-11-07

## 2023-11-07 RX ORDER — PROMETHAZINE HYDROCHLORIDE AND DEXTROMETHORPHAN HYDROBROMIDE 6.25; 15 MG/5ML; MG/5ML
5 SYRUP ORAL
Qty: 120 ML | Refills: 0 | Status: SHIPPED | OUTPATIENT
Start: 2023-11-07

## 2023-11-07 RX ORDER — DOXYCYCLINE 100 MG/1
100 CAPSULE ORAL EVERY 12 HOURS
Qty: 10 CAPSULE | Refills: 0 | Status: SHIPPED | OUTPATIENT
Start: 2023-11-07 | End: 2023-11-12

## 2023-11-07 RX ORDER — OSELTAMIVIR PHOSPHATE 75 MG/1
75 CAPSULE ORAL 2 TIMES DAILY
Qty: 10 CAPSULE | Refills: 0 | Status: SHIPPED | OUTPATIENT
Start: 2023-11-07 | End: 2023-11-12

## 2023-11-07 NOTE — PATIENT INSTRUCTIONS
Please follow instructions on patient education material.      Return to urgent care in 2 to 3 days if symptoms are not improving, immediately if you develop any new or worsening symptoms.   - Start antibiotics and antivirals today.  - OTC cold/flu products as desired for symptoms  - Plenty of fluids  - Humidified air  - Tylenol or Motrin for pain/fever    Go to the ER if you experience chest pain with shortness of breath, shortness of breath when moving around your house, high fevers 103.0+, excessive vomiting/diarrhea, or general distress.     73

## 2023-11-07 NOTE — PROGRESS NOTES
"Subjective:      Patient ID: Pierre Pierson is a 63 y.o. male.    Vitals:  height is 5' 8" (1.727 m) and weight is 60.8 kg (134 lb). His oral temperature is 98.6 °F (37 °C). His blood pressure is 113/76 and his pulse is 86. His respiration is 18 and oxygen saturation is 94% (abnormal).     Chief Complaint: URI (Nasal drainage and productive cough over one week)    URI      As stated in CC. Presents with c/o wet worsening cough. Pt denies taking any medications for symptoms Pt denies hemoptysis, fever, abd pain, chest pain, shortness of breath, dizziness or headache.  ROS   Objective:     Physical Exam   Constitutional: He is oriented to person, place, and time. He appears well-developed.  Non-toxic appearance. He does not appear ill. No distress.   HENT:   Head: Normocephalic and atraumatic.   Ears:   Right Ear: Tympanic membrane normal.   Left Ear: Tympanic membrane normal.   Nose: Rhinorrhea and congestion present. No purulent discharge. Right sinus exhibits no maxillary sinus tenderness and no frontal sinus tenderness. Left sinus exhibits no maxillary sinus tenderness and no frontal sinus tenderness.   Mouth/Throat: Uvula is midline and mucous membranes are normal. No oropharyngeal exudate, posterior oropharyngeal edema, posterior oropharyngeal erythema or tonsillar abscesses.   Eyes: Right eye exhibits no discharge. Left eye exhibits no discharge. Extraocular movement intact   Neck: Neck supple. No neck rigidity present.   Cardiovascular: Regular rhythm.   Pulmonary/Chest: Effort normal. No stridor. No respiratory distress. He has no wheezes. He has rhonchi. He has no rales. He exhibits no tenderness.   Abdominal: Normal appearance and bowel sounds are normal. He exhibits no distension. Soft. There is no abdominal tenderness.   Lymphadenopathy:     He has no cervical adenopathy.   Neurological: no focal deficit. He is alert and oriented to person, place, and time.   Skin: Skin is warm, dry and not diaphoretic. " Capillary refill takes less than 2 seconds.   Psychiatric: His behavior is normal. Mood, judgment and thought content normal.   Nursing note and vitals reviewed.      Assessment:     1. Influenza due to influenza A virus with upper respiratory signs    2. Productive cough    3. Acute rhinitis    .XR CHEST PA AND LATERAL    Result Date: 11/7/2023  EXAMINATION: XR CHEST PA AND LATERAL CLINICAL HISTORY: Acute cough TECHNIQUE: PA and lateral chest radiographs COMPARISON: Chest x-ray dated 10/19/2023 FINDINGS: The heart is normal in size.  There is no focal airspace consolidation.  There is no pleural effusion or visible pneumothorax.     No acute abnormality of the chest. Electronically signed by: Karissa Madrid Date:    11/07/2023 Time:    16:00    X-Ray Chest 1 View    Result Date: 10/20/2023  EXAMINATION: XR CHEST 1 VIEW CLINICAL HISTORY: hypotension; TECHNIQUE: Frontal view(s) of the chest. COMPARISON: Radiography 06/04/2023 FINDINGS: Normal cardiac silhouette.  The lungs are well inflated with similar upper lung architectural changes.  No consolidation identified.  No significant pleural effusion or discernible pneumothorax.     No acute pulmonary process identified. Electronically signed by: Saturnino Olson Date:    10/20/2023 Time:    11:29   Results for orders placed or performed in visit on 11/07/23   POCT COVID-19 Rapid Screening   Result Value Ref Range    POC Rapid COVID Negative Negative     Acceptable Yes    POCT Strep A, Molecular   Result Value Ref Range    Molecular Strep A, POC Negative Negative     Acceptable Yes    POCT Influenza A/B Molecular   Result Value Ref Range    POC Molecular Influenza A Ag Positive (A) Negative, Not Reported    POC Molecular Influenza B Ag Negative Negative, Not Reported     Acceptable Yes          Plan:     ER precautions given  - OTC cold/flu products as desired for symptoms  - Plenty of fluids  - Humidified air  - Tylenol or  Motrin for pain/fever    Go to the ER if you experience chest pain with shortness of breath, shortness of breath when moving around your house, high fevers 103.0+, excessive vomiting/diarrhea, or general distress.  Influenza due to influenza A virus with upper respiratory signs  -     oseltamivir (TAMIFLU) 75 MG capsule; Take 1 capsule (75 mg total) by mouth 2 (two) times daily. for 5 days  Dispense: 10 capsule; Refill: 0    Productive cough  -     XR CHEST PA AND LATERAL; Future; Expected date: 11/07/2023  -     POCT COVID-19 Rapid Screening  -     POCT Strep A, Molecular  -     POCT Influenza A/B Molecular  -     promethazine-dextromethorphan (PROMETHAZINE-DM) 6.25-15 mg/5 mL Syrp; Take 5 mLs by mouth every 6 to 8 hours as needed (cough). May cause sedation.  Do not drive or operate heavy machinery after taking this medication.  Dispense: 120 mL; Refill: 0    Acute rhinitis  -     fluticasone propionate (FLONASE) 50 mcg/actuation nasal spray; 2 sprays (100 mcg total) by Each Nostril route once daily.  Dispense: 16 g; Refill: 0    Other orders  -     doxycycline (VIBRAMYCIN) 100 MG Cap; Take 1 capsule (100 mg total) by mouth every 12 (twelve) hours. for 5 days  Dispense: 10 capsule; Refill: 0

## 2023-12-04 PROBLEM — N17.9 AKI (ACUTE KIDNEY INJURY): Status: RESOLVED | Noted: 2023-09-03 | Resolved: 2023-12-04

## 2024-08-26 ENCOUNTER — OFFICE VISIT (OUTPATIENT)
Dept: INTERNAL MEDICINE | Facility: CLINIC | Age: 64
End: 2024-08-26
Payer: MEDICAID

## 2024-08-26 ENCOUNTER — TELEPHONE (OUTPATIENT)
Dept: INTERNAL MEDICINE | Facility: CLINIC | Age: 64
End: 2024-08-26
Payer: MEDICAID

## 2024-08-26 ENCOUNTER — LAB VISIT (OUTPATIENT)
Dept: LAB | Facility: HOSPITAL | Age: 64
End: 2024-08-26
Payer: MEDICAID

## 2024-08-26 VITALS
BODY MASS INDEX: 18.52 KG/M2 | HEART RATE: 77 BPM | RESPIRATION RATE: 20 BRPM | WEIGHT: 121.81 LBS | DIASTOLIC BLOOD PRESSURE: 71 MMHG | TEMPERATURE: 98 F | SYSTOLIC BLOOD PRESSURE: 114 MMHG | OXYGEN SATURATION: 95 %

## 2024-08-26 DIAGNOSIS — J44.9 CHRONIC OBSTRUCTIVE PULMONARY DISEASE, UNSPECIFIED COPD TYPE: ICD-10-CM

## 2024-08-26 DIAGNOSIS — G40.909 SEIZURE DISORDER: Chronic | ICD-10-CM

## 2024-08-26 DIAGNOSIS — Z12.11 ENCOUNTER FOR SCREENING COLONOSCOPY: ICD-10-CM

## 2024-08-26 DIAGNOSIS — Z00.00 WELLNESS EXAMINATION: ICD-10-CM

## 2024-08-26 DIAGNOSIS — Z23 NEED FOR VACCINATION: ICD-10-CM

## 2024-08-26 DIAGNOSIS — Z87.891 HISTORY OF TOBACCO USE: ICD-10-CM

## 2024-08-26 LAB
ALBUMIN SERPL-MCNC: 4.3 G/DL (ref 3.4–4.8)
ALBUMIN/GLOB SERPL: 1.5 RATIO (ref 1.1–2)
ALP SERPL-CCNC: 102 UNIT/L (ref 40–150)
ALT SERPL-CCNC: 20 UNIT/L (ref 0–55)
ANION GAP SERPL CALC-SCNC: 5 MEQ/L
AST SERPL-CCNC: 32 UNIT/L (ref 5–34)
BILIRUB SERPL-MCNC: 1 MG/DL
BUN SERPL-MCNC: 11.3 MG/DL (ref 8.4–25.7)
CALCIUM SERPL-MCNC: 9.6 MG/DL (ref 8.8–10)
CHLORIDE SERPL-SCNC: 103 MMOL/L (ref 98–107)
CHOLEST SERPL-MCNC: 195 MG/DL
CHOLEST/HDLC SERPL: 3 {RATIO} (ref 0–5)
CO2 SERPL-SCNC: 32 MMOL/L (ref 23–31)
CREAT SERPL-MCNC: 0.77 MG/DL (ref 0.73–1.18)
CREAT/UREA NIT SERPL: 15
EST. AVERAGE GLUCOSE BLD GHB EST-MCNC: 91.1 MG/DL
GFR SERPLBLD CREATININE-BSD FMLA CKD-EPI: >60 ML/MIN/1.73/M2
GLOBULIN SER-MCNC: 2.9 GM/DL (ref 2.4–3.5)
GLUCOSE SERPL-MCNC: 99 MG/DL (ref 82–115)
HBA1C MFR BLD: 4.8 %
HCV AB SERPL QL IA: NONREACTIVE
HDLC SERPL-MCNC: 69 MG/DL (ref 35–60)
HIV 1+2 AB+HIV1 P24 AG SERPL QL IA: NONREACTIVE
LDLC SERPL CALC-MCNC: 80 MG/DL (ref 50–140)
POTASSIUM SERPL-SCNC: 4.1 MMOL/L (ref 3.5–5.1)
PROT SERPL-MCNC: 7.2 GM/DL (ref 5.8–7.6)
SODIUM SERPL-SCNC: 140 MMOL/L (ref 136–145)
T PALLIDUM AB SER QL: NONREACTIVE
TRIGL SERPL-MCNC: 231 MG/DL (ref 34–140)
VLDLC SERPL CALC-MCNC: 46 MG/DL

## 2024-08-26 PROCEDURE — 90472 IMMUNIZATION ADMIN EACH ADD: CPT | Mod: PBBFAC

## 2024-08-26 PROCEDURE — 80061 LIPID PANEL: CPT

## 2024-08-26 PROCEDURE — 90677 PCV20 VACCINE IM: CPT | Mod: PBBFAC

## 2024-08-26 PROCEDURE — 83036 HEMOGLOBIN GLYCOSYLATED A1C: CPT

## 2024-08-26 PROCEDURE — 90471 IMMUNIZATION ADMIN: CPT | Mod: PBBFAC

## 2024-08-26 PROCEDURE — 80053 COMPREHEN METABOLIC PANEL: CPT

## 2024-08-26 PROCEDURE — 86780 TREPONEMA PALLIDUM: CPT

## 2024-08-26 PROCEDURE — 36415 COLL VENOUS BLD VENIPUNCTURE: CPT

## 2024-08-26 PROCEDURE — 99214 OFFICE O/P EST MOD 30 MIN: CPT | Mod: PBBFAC,25

## 2024-08-26 PROCEDURE — 86803 HEPATITIS C AB TEST: CPT

## 2024-08-26 PROCEDURE — 87389 HIV-1 AG W/HIV-1&-2 AB AG IA: CPT

## 2024-08-26 PROCEDURE — 90750 HZV VACC RECOMBINANT IM: CPT | Mod: PBBFAC

## 2024-08-26 RX ORDER — PHENYTOIN SODIUM 100 MG/1
300 CAPSULE, EXTENDED RELEASE ORAL NIGHTLY
Qty: 270 CAPSULE | Refills: 3 | Status: SHIPPED | OUTPATIENT
Start: 2024-08-26 | End: 2024-08-26

## 2024-08-26 RX ORDER — PHENYTOIN SODIUM 100 MG/1
300 CAPSULE, EXTENDED RELEASE ORAL NIGHTLY
Qty: 270 CAPSULE | Refills: 3 | Status: SHIPPED | OUTPATIENT
Start: 2024-08-26 | End: 2025-08-26

## 2024-08-26 RX ORDER — UMECLIDINIUM BROMIDE AND VILANTEROL TRIFENATATE 62.5; 25 UG/1; UG/1
1 POWDER RESPIRATORY (INHALATION) DAILY
Qty: 60 EACH | Refills: 10 | Status: SHIPPED | OUTPATIENT
Start: 2024-08-26

## 2024-08-26 RX ORDER — LEVETIRACETAM 1000 MG/1
1000 TABLET ORAL 2 TIMES DAILY
Qty: 180 TABLET | Refills: 3 | Status: SHIPPED | OUTPATIENT
Start: 2024-08-26 | End: 2025-08-26

## 2024-08-26 RX ORDER — LEVETIRACETAM 1000 MG/1
1000 TABLET ORAL 2 TIMES DAILY
Qty: 180 TABLET | Refills: 3 | Status: SHIPPED | OUTPATIENT
Start: 2024-08-26 | End: 2024-08-26

## 2024-08-26 RX ADMIN — ZOSTER VACCINE RECOMBINANT, ADJUVANTED 0.5 ML: KIT at 10:08

## 2024-08-26 RX ADMIN — PNEUMOCOCCAL 20-VALENT CONJUGATE VACCINE 0.5 ML
2.2; 2.2; 2.2; 2.2; 2.2; 2.2; 2.2; 2.2; 2.2; 2.2; 2.2; 2.2; 2.2; 2.2; 2.2; 2.2; 4.4; 2.2; 2.2; 2.2 INJECTION, SUSPENSION INTRAMUSCULAR at 10:08

## 2024-08-26 NOTE — PROGRESS NOTES
[facility] INTERNAL MEDICINE  OUTPATIENT OFFICE VISIT NOTE    SUBJECTIVE:      Chief Complaint: Establish Care (Want zoster and prevnar 20. Coughing up mucus x's months blood tingled at times.)       HPI: Pierre Pierson is a 64 y.o. yo male w/ PMH of  has a past medical history of Asthma, COPD (chronic obstructive pulmonary disease), and Seizures., who presents for establishment with myself. Patient has not had a primary care doctor in a very long time. Overall he appears ill. Likely several pathologies related to his recreational activities. Patient endorses occasional meth use and marijuana. He smokes one pack of cigarettes every 3 days. He has been attempting to slow down. Patient also consumes a pint of vodka a day. He says his goal is to live another 5 years or so. He is not amenable to quitting alcohol, meth, nor cigarettes. Will initiate screenings for diseases processes.       Past Medical History:   has a past medical history of Asthma, COPD (chronic obstructive pulmonary disease), and Seizures.     Past Surgical History:   has no past surgical history on file.     Family History:  family history is not on file.     Social History:   reports that he has been smoking cigarettes. He has never used smokeless tobacco. He reports that he does not currently use alcohol. He reports current drug use. Drug: Marijuana.     Allergies:  is allergic to keflex [cephalexin] and keppra [levetiracetam].     Home Medications:  Current Outpatient Medications   Medication Instructions    albuterol (PROVENTIL/VENTOLIN HFA) 90 mcg/actuation inhaler 1-2 puffs, Inhalation, Every 6 hours PRN, Rescue    fluticasone propionate (FLONASE) 100 mcg, Each Nostril, Daily    folic acid (FOLVITE) 1 mg, Oral, Daily    levETIRAcetam (KEPPRA) 1,000 mg, Oral, 2 times daily    metoprolol succinate (TOPROL-XL) 12.5 mg, Oral, Daily    multivitamin Tab 1 tablet, Oral, Daily    phenytoin (DILANTIN) 300 mg, Oral, Nightly    promethazine-dextromethorphan  (PROMETHAZINE-DM) 6.25-15 mg/5 mL Syrp 5 mLs, Oral, Every 6-8 hours PRN, May cause sedation.  Do not drive or operate heavy machinery after taking this medication.        ROS:  Negative for all symptoms other than those listed in HPI         OBJECTIVE:     Vital signs:   /71 (BP Location: Right arm, Patient Position: Sitting, BP Method: Small (Automatic))   Pulse 77   Temp 98.2 °F (36.8 °C) (Oral)   Resp 20   Wt 55.2 kg (121 lb 12.8 oz)   SpO2 (!) 92%   BMI 18.52 kg/m²      Physical Examination:  Physical Exam  Constitutional:       Appearance: He is ill-appearing.      Comments: underweight   Eyes:      General:         Right eye: No discharge.         Left eye: No discharge.      Pupils: Pupils are equal, round, and reactive to light.   Cardiovascular:      Rate and Rhythm: Normal rate and regular rhythm.      Heart sounds: No murmur heard.     No friction rub. No gallop.   Pulmonary:      Effort: No respiratory distress.      Breath sounds: Wheezing and rales present.   Abdominal:      General: Abdomen is flat. There is no distension.      Palpations: Abdomen is soft. There is no mass.      Tenderness: There is no abdominal tenderness.      Hernia: No hernia is present.   Musculoskeletal:         General: No swelling, tenderness, deformity or signs of injury.   Skin:     Capillary Refill: Capillary refill takes less than 2 seconds.      Coloration: Skin is not jaundiced or pale.      Findings: No bruising or erythema.   Neurological:      General: No focal deficit present.           Labs:  BMP:   Lab Results   Component Value Date    CHLORIDE 101 04/24/2021    CO2 28 10/28/2023    BUN 9.3 10/28/2023    CREATININE 0.82 10/28/2023    GLUCOSE 67 (L) 10/28/2023    CALCIUM 8.2 (L) 10/28/2023     CBC:   Lab Results   Component Value Date    WBC 6.34 10/28/2023    HGB 13.0 (L) 10/28/2023    HCT 40.4 (L) 10/28/2023    MCV 94.4 (H) 10/28/2023    RDW 13.2 10/28/2023     LFTs:   Lab Results   Component Value  Date    LABPROT 6.3 10/28/2023    ALBUMIN 3.9 10/28/2023    AST 28 10/28/2023    ALT 15 10/28/2023    ALKPHOS 61 10/28/2023     FLP:   Lab Results   Component Value Date    CHOL 145 06/04/2023    HDL 37 06/04/2023    LDL 97.00 06/04/2023    TRIG 53 06/04/2023    TOTALCHOLEST 4 06/04/2023     DM:   Lab Results   Component Value Date    HGBA1C 4.9 01/28/2023    EAG 93.9 01/28/2023    CREATININE 0.82 10/28/2023    CREATRANDUR 166.7 01/11/2021     Thyroid:   Lab Results   Component Value Date    TSH 1.470 10/19/2023     Anemia:   Lab Results   Component Value Date    CVPNSFJJ39 312 09/03/2023    FOLATE 11.8 09/04/2023               ASSESSMENT & PLAN:        COPD suspected  -previous history of bronchitis  - will ordered PFT today  -prescribed anoro as documented history of copd exacerbations on file. Meets Gold E criteria.       Alcohol use disorder  -drinks pint of vodka a day. Not amenable to quitting. I have discussed with patient thoroughly the need for inpatient rehab. He declined and I offered him naltrexone and acamprosate. Patient would like to discuss at another time.     Tobacco use disorder  - does not wish to quit.   -LDCT ordered.     Underweight  -no food insecurity present   -likely related to lung disease.    Seizures  -no recent seizures. Does not follow up with neurology  -continue phenytoin and keppra      Health Maintenance  Vaccinations  Immunization History   Administered Date(s) Administered    Influenza - Quadrivalent - PF *Preferred* (6 months and older) 10/15/2019    Pneumococcal Conjugate - 13 Valent 10/15/2019       -Flu: will address at next visit    -Pneumonia: done on 8/26/2024     -Shingles: done on 8/26/2024   -Tdap: done on 12/24/2019  Screening   -Lung Ca. Screening:  ordered today   -Colon Ca. Screening:  colo guard ordered   -Hep C, HIV:  ordered today  Social   -EtOH:  reports that he does not currently use alcohol.   -Tobacco:  reports that he has been smoking cigarettes. He has  never used smokeless tobacco.     -Drugs:  reports current drug use. Drug: Marijuana.    -Depression:   Depression: Low Risk  (8/26/2024)    Depression     Last PHQ-4: Flowsheet Data: 0              Return to clinic in 4 month(s).    Kimberly Smith M.D  [facility] Internal Medicine PGY-2

## 2024-08-29 ENCOUNTER — HOSPITAL ENCOUNTER (OUTPATIENT)
Dept: PULMONOLOGY | Facility: HOSPITAL | Age: 64
Discharge: HOME | End: 2024-08-29
Payer: MEDICAID

## 2024-08-29 VITALS — RESPIRATION RATE: 19 BRPM | HEART RATE: 80 BPM | OXYGEN SATURATION: 93 %

## 2024-08-29 DIAGNOSIS — J44.9 CHRONIC OBSTRUCTIVE PULMONARY DISEASE, UNSPECIFIED COPD TYPE: ICD-10-CM

## 2024-08-29 PROCEDURE — 94060 EVALUATION OF WHEEZING: CPT

## 2024-08-29 PROCEDURE — 94640 AIRWAY INHALATION TREATMENT: CPT | Mod: XB

## 2024-08-29 PROCEDURE — 94729 DIFFUSING CAPACITY: CPT

## 2024-08-29 PROCEDURE — 94726 PLETHYSMOGRAPHY LUNG VOLUMES: CPT

## 2024-08-29 RX ORDER — ALBUTEROL SULFATE 2.5 MG/.5ML
2.5 SOLUTION RESPIRATORY (INHALATION) EVERY 4 HOURS PRN
Status: DISPENSED | OUTPATIENT
Start: 2024-08-29

## 2024-08-29 RX ADMIN — ALBUTEROL SULFATE 2.5 MG: 2.5 SOLUTION RESPIRATORY (INHALATION) at 09:08

## 2024-08-29 NOTE — PROGRESS NOTES
Good cooperation and effort given. Albuterol 2.5 mg given HR 80/83, SAT 93%, BBS wheezing.   PFT completed

## 2024-08-30 LAB
DLCO SINGLE BREATH LLN: 20.15
DLCO SINGLE BREATH PRE REF: 32.7 %
DLCO SINGLE BREATH REF: 27.07
DLCOC SBVA LLN: 2.79
DLCOC SBVA REF: 4.03
DLCOC SINGLE BREATH LLN: 20.15
DLCOC SINGLE BREATH REF: 27.07
DLCOVA LLN: 2.79
DLCOVA PRE REF: 45.2 %
DLCOVA PRE: 1.82 ML/(MIN*MMHG*L) (ref 2.79–5.27)
DLCOVA REF: 4.03
ERV LLN: -16448.89
ERV PRE REF: 84.2 %
ERV REF: 1.11
FEF 25 75 CHG: 21.3 %
FEF 25 75 LLN: 1.67
FEF 25 75 POST REF: 10.6 %
FEF 25 75 PRE REF: 8.8 %
FEF 25 75 REF: 3.38
FET100 CHG: 4.8 %
FEV1 CHG: 11.4 %
FEV1 FVC CHG: -10.7 %
FEV1 FVC LLN: 65
FEV1 FVC POST REF: 44.9 %
FEV1 FVC PRE REF: 50.3 %
FEV1 FVC REF: 77
FEV1 LLN: 2.45
FEV1 POST REF: 25.1 %
FEV1 PRE REF: 22.6 %
FEV1 REF: 3.28
FRCPLETH LLN: 2.52
FRCPLETH PREREF: 185.7 %
FRCPLETH REF: 3.51
FVC CHG: 24.8 %
FVC LLN: 3.22
FVC POST REF: 55.9 %
FVC PRE REF: 44.8 %
FVC REF: 4.24
IVC PRE: 2.65 L (ref 3.22–5.28)
IVC SINGLE BREATH LLN: 3.22
IVC SINGLE BREATH PRE REF: 62.5 %
IVC SINGLE BREATH REF: 4.24
MVV LLN: 107
MVV PRE REF: 18.6 %
MVV REF: 126
PEF CHG: 12.3 %
PEF LLN: 6.44
PEF POST REF: 12.1 %
PEF PRE REF: 10.8 %
PEF REF: 8.63
POST FEF 25 75: 0.36 L/S (ref 1.67–5.1)
POST FET 100: 8.2 SEC
POST FEV1 FVC: 34.77 % (ref 65.12–88.28)
POST FEV1: 0.83 L (ref 2.45–4.07)
POST FVC: 2.37 L (ref 3.22–5.28)
POST PEF: 1.04 L/S (ref 6.44–10.81)
PRE DLCO: 8.85 ML/(MIN*MMHG) (ref 20.15–34)
PRE ERV: 0.94 L (ref -16448.89–16451.11)
PRE FEF 25 75: 0.3 L/S (ref 1.67–5.1)
PRE FET 100: 7.83 SEC
PRE FEV1 FVC: 38.95 % (ref 65.12–88.28)
PRE FEV1: 0.74 L (ref 2.45–4.07)
PRE FRC PL: 6.52 L (ref 2.52–4.5)
PRE FVC: 1.9 L (ref 3.22–5.28)
PRE MVV: 23.37 L/MIN (ref 106.72–144.39)
PRE PEF: 0.93 L/S (ref 6.44–10.81)
PRE RV: 5.58 L (ref 1.72–3.07)
PRE TLC: 7.95 L (ref 5.57–7.87)
RAW LLN: 3.06
RAW PRE REF: 487.1 %
RAW PRE: 14.9 CMH2O*S/L (ref 3.06–3.06)
RAW REF: 3.06
RV LLN: 1.72
RV PRE REF: 232.9 %
RV REF: 2.4
RVTLC LLN: 29
RVTLC PRE REF: 184.2 %
RVTLC PRE: 70.25 % (ref 29.16–47.12)
RVTLC REF: 38
TLC LLN: 5.57
TLC PRE REF: 118.2 %
TLC REF: 6.72
VA PRE: 4.86 L (ref 6.57–6.57)
VA SINGLE BREATH LLN: 6.57
VA SINGLE BREATH PRE REF: 74 %
VA SINGLE BREATH REF: 6.57
VC LLN: 3.22
VC PRE REF: 55.7 %
VC PRE: 2.36 L (ref 3.22–5.28)
VC REF: 4.24

## 2024-09-10 ENCOUNTER — HOSPITAL ENCOUNTER (OUTPATIENT)
Dept: RADIOLOGY | Facility: HOSPITAL | Age: 64
Discharge: HOME | End: 2024-09-10
Payer: MEDICAID

## 2024-09-10 DIAGNOSIS — Z87.891 HISTORY OF TOBACCO USE: ICD-10-CM

## 2024-09-10 PROCEDURE — 71271 CT THORAX LUNG CANCER SCR C-: CPT | Mod: TC

## 2024-09-23 ENCOUNTER — TELEPHONE (OUTPATIENT)
Dept: INTERNAL MEDICINE | Facility: CLINIC | Age: 64
End: 2024-09-23
Payer: MEDICAID

## 2024-09-23 DIAGNOSIS — R91.1 LUNG NODULE: Primary | ICD-10-CM

## 2024-09-23 NOTE — TELEPHONE ENCOUNTER
Patients recent CT showed Lung-RADS Category:  4A - Suspicious - consultation advised - possible next steps 3 month LDCT -in some scenarios PET/CT.     Clinically or potentially clinically significant non lung cancer finding:  Advanced upper lobe predominant centrilobular emphysematous change with apical bullous disease.    Will give referall to lung clinic.    Kimberly Smith M.D  [facility] Internal Medicine PGY-2

## 2024-12-12 DIAGNOSIS — R91.8 ABNORMAL CT SCAN OF LUNG: Primary | ICD-10-CM

## 2024-12-12 NOTE — PROGRESS NOTES
CT chest non contrast scheduled for 3 month follow up a LUNG RAD 4a LDCT 8/2024. Multiple attempts to reach patient have been unsuccessful. Brother was contacted who is not in touch with patient. No significant other on chart. Letter sent to schedule pulmonology appointment without response. Will continue to attempt to reach patient.     Dr. Green

## 2025-01-02 NOTE — NURSING
Admission    Received 62Y male from ER with a DX: Seizure Disorder. HX: ETOH, HF, COPD, asthma, smoker. Patient is alert and O X 4, ambulates without difficulty. No seizures noted presently. Tele inplace. O2 at 2L. Head to toe assessment done. Orders noted. Will monitor   Lisinopril hydrochlorothiazide  Last refill: 07/15/24  qtY: 180  W/ 1 refills  Last ov: 06/14/24    Metformin  Last refill: 07/15/24  Qty: 90  W/ 1 refills  Last ov 06/14/24  Requested Prescriptions     Pending Prescriptions Disp Refills    lisinopril-hydroCHLOROthiazide 20-25 MG Oral Tab 180 tablet 1     Sig: Take 2 tablets by mouth daily.    metFORMIN 500 MG Oral Tab 90 tablet 1     Sig: Take 1 tablet (500 mg total) by mouth daily with breakfast.     No future appointments.  BP Readings from Last 3 Encounters:   06/14/24 120/70   02/02/24 138/80   06/02/23 120/72

## 2025-05-25 ENCOUNTER — HOSPITAL ENCOUNTER (EMERGENCY)
Facility: HOSPITAL | Age: 65
Discharge: HOME | End: 2025-05-25
Attending: EMERGENCY MEDICINE
Payer: MEDICAID

## 2025-05-25 VITALS
HEART RATE: 61 BPM | WEIGHT: 120 LBS | DIASTOLIC BLOOD PRESSURE: 69 MMHG | HEIGHT: 72 IN | SYSTOLIC BLOOD PRESSURE: 96 MMHG | TEMPERATURE: 98 F | RESPIRATION RATE: 16 BRPM | BODY MASS INDEX: 16.25 KG/M2 | OXYGEN SATURATION: 96 %

## 2025-05-25 DIAGNOSIS — R56.9 SEIZURE: ICD-10-CM

## 2025-05-25 LAB
ALBUMIN SERPL-MCNC: 3.7 G/DL (ref 3.4–4.8)
ALBUMIN/GLOB SERPL: 1.2 RATIO (ref 1.1–2)
ALP SERPL-CCNC: 68 UNIT/L (ref 40–150)
ALT SERPL-CCNC: 9 UNIT/L (ref 0–55)
ANION GAP SERPL CALC-SCNC: 9 MEQ/L
AST SERPL-CCNC: 15 UNIT/L (ref 11–45)
BASOPHILS # BLD AUTO: 0.09 X10(3)/MCL
BASOPHILS NFR BLD AUTO: 1 %
BILIRUB SERPL-MCNC: 0.4 MG/DL
BUN SERPL-MCNC: 16.5 MG/DL (ref 8.4–25.7)
CALCIUM SERPL-MCNC: 8.9 MG/DL (ref 8.8–10)
CHLORIDE SERPL-SCNC: 103 MMOL/L (ref 98–107)
CO2 SERPL-SCNC: 26 MMOL/L (ref 23–31)
CREAT SERPL-MCNC: 0.62 MG/DL (ref 0.72–1.25)
CREAT/UREA NIT SERPL: 27
EOSINOPHIL # BLD AUTO: 0.27 X10(3)/MCL (ref 0–0.9)
EOSINOPHIL NFR BLD AUTO: 3 %
ERYTHROCYTE [DISTWIDTH] IN BLOOD BY AUTOMATED COUNT: 13.2 % (ref 11.5–17)
GFR SERPLBLD CREATININE-BSD FMLA CKD-EPI: >60 ML/MIN/1.73/M2
GLOBULIN SER-MCNC: 3.1 GM/DL (ref 2.4–3.5)
GLUCOSE SERPL-MCNC: 83 MG/DL (ref 82–115)
GLUCOSE SERPL-MCNC: 94 MG/DL (ref 70–110)
HCT VFR BLD AUTO: 42.4 % (ref 42–52)
HGB BLD-MCNC: 13.4 G/DL (ref 14–18)
IMM GRANULOCYTES # BLD AUTO: 0.03 X10(3)/MCL (ref 0–0.04)
IMM GRANULOCYTES NFR BLD AUTO: 0.3 %
LYMPHOCYTES # BLD AUTO: 1.71 X10(3)/MCL (ref 0.6–4.6)
LYMPHOCYTES NFR BLD AUTO: 19 %
MCH RBC QN AUTO: 31.2 PG (ref 27–31)
MCHC RBC AUTO-ENTMCNC: 31.6 G/DL (ref 33–36)
MCV RBC AUTO: 98.8 FL (ref 80–94)
MONOCYTES # BLD AUTO: 0.66 X10(3)/MCL (ref 0.1–1.3)
MONOCYTES NFR BLD AUTO: 7.3 %
NEUTROPHILS # BLD AUTO: 6.23 X10(3)/MCL (ref 2.1–9.2)
NEUTROPHILS NFR BLD AUTO: 69.4 %
NRBC BLD AUTO-RTO: 0 %
PLATELET # BLD AUTO: 220 X10(3)/MCL (ref 130–400)
PMV BLD AUTO: 8.7 FL (ref 7.4–10.4)
POCT GLUCOSE: 94 MG/DL (ref 70–110)
POTASSIUM SERPL-SCNC: 4 MMOL/L (ref 3.5–5.1)
PROT SERPL-MCNC: 6.8 GM/DL (ref 5.8–7.6)
RBC # BLD AUTO: 4.29 X10(6)/MCL (ref 4.7–6.1)
SODIUM SERPL-SCNC: 138 MMOL/L (ref 136–145)
WBC # BLD AUTO: 8.99 X10(3)/MCL (ref 4.5–11.5)

## 2025-05-25 PROCEDURE — 82962 GLUCOSE BLOOD TEST: CPT

## 2025-05-25 PROCEDURE — 93010 ELECTROCARDIOGRAM REPORT: CPT | Mod: ,,, | Performed by: INTERNAL MEDICINE

## 2025-05-25 PROCEDURE — 85025 COMPLETE CBC W/AUTO DIFF WBC: CPT | Performed by: EMERGENCY MEDICINE

## 2025-05-25 PROCEDURE — 80053 COMPREHEN METABOLIC PANEL: CPT | Performed by: EMERGENCY MEDICINE

## 2025-05-25 PROCEDURE — 93005 ELECTROCARDIOGRAM TRACING: CPT

## 2025-05-25 PROCEDURE — 99284 EMERGENCY DEPT VISIT MOD MDM: CPT | Mod: 25

## 2025-05-25 PROCEDURE — 25000003 PHARM REV CODE 250: Performed by: EMERGENCY MEDICINE

## 2025-05-25 RX ORDER — LEVETIRACETAM 500 MG/1
1000 TABLET ORAL
Status: COMPLETED | OUTPATIENT
Start: 2025-05-25 | End: 2025-05-25

## 2025-05-25 RX ORDER — LEVETIRACETAM 1000 MG/1
1000 TABLET ORAL 2 TIMES DAILY
Qty: 60 TABLET | Refills: 0 | Status: SHIPPED | OUTPATIENT
Start: 2025-05-25 | End: 2025-06-24

## 2025-05-25 RX ADMIN — LEVETIRACETAM 1000 MG: 500 TABLET, FILM COATED ORAL at 05:05

## 2025-05-25 NOTE — ED PROVIDER NOTES
Encounter Date: 5/25/2025    SCRIBE #1 NOTE: I, Arnaldo Colon, am scribing for, and in the presence of,  Karthikeyan Ceja III, MD. I have scribed the following portions of the note - Other sections scribed: HPI,ROS,PE.       History     Chief Complaint   Patient presents with    Seizures     Pt arrives via [facility], EMS report pt called them stating that he wants to get off Keppra, denied having a seizure with EMS. Pt stated to EMS that he got kicked out of [facility] and was now homeless and needed medication. On arrival pt now reports that he believes he had a seizure prior to EMS arrival. Pt became very agitated during orientations questions, say that my questions were bulls@#t, pt unable to give me year or month. Pt reports that he is on 3 different medications for seizures     66 y/o male with PMHx of asthma, COPD, and seizures presents to ED via EMS c/o seizure onset this morning. Pt reports having a seizure tonight, and calling EMS. He reports he ran out of his seizure medication yesterday. He admits to EtOH and marijuana use tonight. He denies any SI, HI, or hallucinations.     The history is provided by the patient and medical records.     Review of patient's allergies indicates:   Allergen Reactions    Keflex [cephalexin]     Keppra [levetiracetam] Palpitations     Past Medical History:   Diagnosis Date    Asthma     COPD (chronic obstructive pulmonary disease)     Seizures      No past surgical history on file.  No family history on file.  Social History[1]  Review of Systems   Constitutional:  Negative for appetite change, chills and fever.   HENT:  Negative for congestion and sore throat.    Eyes:  Negative for pain and visual disturbance.   Respiratory:  Negative for cough and shortness of breath.    Cardiovascular:  Negative for chest pain and leg swelling.   Gastrointestinal:  Negative for abdominal pain, diarrhea, nausea and vomiting.   Genitourinary:  Negative for dysuria and hematuria.   Skin:   Negative for rash and wound.   Allergic/Immunologic: Negative for food allergies and immunocompromised state.   Neurological:  Positive for seizures. Negative for syncope and weakness.   Psychiatric/Behavioral:  Negative for hallucinations and suicidal ideas.         Denies HI        Physical Exam     Initial Vitals [05/25/25 0329]   BP Pulse Resp Temp SpO2   109/68 80 18 97.8 °F (36.6 °C) 95 %      MAP       --         Physical Exam    Nursing note and vitals reviewed.  Constitutional: No distress.   Smell of EtOH on breath.   Tall and thin male.    HENT:   Head: Normocephalic and atraumatic.   Neck: Trachea normal.   Cardiovascular:  Normal rate and regular rhythm.           No murmur heard.  Pulmonary/Chest: Breath sounds normal. No respiratory distress.   Abdominal: Abdomen is soft. Bowel sounds are normal. He exhibits no distension. There is no abdominal tenderness.   Musculoskeletal:         General: Normal range of motion.      Lumbar back: Normal.     Neurological: He is alert and oriented to person, place, and time. He has normal strength. No cranial nerve deficit.   Skin: Skin is warm and dry. No rash noted.   Psychiatric: He has a normal mood and affect. Judgment normal. He is not actively hallucinating. He expresses no homicidal and no suicidal ideation.         ED Course   Procedures  Labs Reviewed   COMPREHENSIVE METABOLIC PANEL - Abnormal       Result Value    Sodium 138      Potassium 4.0      Chloride 103      CO2 26      Glucose 83      Blood Urea Nitrogen 16.5      Creatinine 0.62 (*)     Calcium 8.9      Protein Total 6.8      Albumin 3.7      Globulin 3.1      Albumin/Globulin Ratio 1.2      Bilirubin Total 0.4      ALP 68      ALT 9      AST 15      eGFR >60      Anion Gap 9.0      BUN/Creatinine Ratio 27     CBC WITH DIFFERENTIAL - Abnormal    WBC 8.99      RBC 4.29 (*)     Hgb 13.4 (*)     Hct 42.4      MCV 98.8 (*)     MCH 31.2 (*)     MCHC 31.6 (*)     RDW 13.2      Platelet 220      MPV  8.7      Neut % 69.4      Lymph % 19.0      Mono % 7.3      Eos % 3.0      Basophil % 1.0      Imm Grans % 0.3      Neut # 6.23      Lymph # 1.71      Mono # 0.66      Eos # 0.27      Baso # 0.09      Imm Gran # 0.03      NRBC% 0.0     CBC W/ AUTO DIFFERENTIAL    Narrative:     The following orders were created for panel order CBC auto differential.  Procedure                               Abnormality         Status                     ---------                               -----------         ------                     CBC with Differential[[id number]]       Abnormal            Final result                 Please view results for these tests on the individual orders.   POCT GLUCOSE    POCT Glucose 94     POCT GLUCOSE MONITORING CONTINUOUS     EKG Readings: (Independently Interpreted)   Initial Reading: No STEMI. Rhythm: Normal Sinus Rhythm. Heart Rate: 73. Ectopy: No Ectopy. Conduction: RBBB. ST Segments: Normal ST Segments. T Waves: Normal. Axis: Normal.   Taken at 03:27       Imaging Results    None          Medications   levETIRAcetam tablet 1,000 mg (1,000 mg Oral Given 5/25/25 0516)     Medical Decision Making  The differential diagnosis includes, but is not limited to, homelessness, EtOH intoxication, and seizure disorder.     Awake alert oriented patient states he is here because he is out of his seizure medicine you states he has been homeless for awhile the nurse report that he had been [place] patient denies nonsuicidal non homicidal will give patient 1 dose Keppra and discharged    Problems Addressed:  Seizure: complicated acute illness or injury that poses a threat to life or bodily functions    Amount and/or Complexity of Data Reviewed  Labs: ordered. Decision-making details documented in ED Course.  ECG/medicine tests: ordered and independent interpretation performed.     Details: Initial Reading: No STEMI. Rhythm: Normal Sinus Rhythm. Heart Rate: 73. Ectopy: No Ectopy.  Conduction: RBBB. ST Segments: Normal ST Segments. T Waves: Normal. Axis: Normal.   Taken at 03:27    Risk  Prescription drug management.            Scribe Attestation:   Scribe #1: I performed the above scribed service and the documentation accurately describes the services I performed. I attest to the accuracy of the note.    Attending Attestation:           Physician Attestation for Scribe:  Physician Attestation Statement for Scribe #1: I, Karthikeyan Ceja III, MD, reviewed documentation, as scribed by Arnaldo Colon in my presence, and it is both accurate and complete.                                    Clinical Impression:  Final diagnoses:  [R56.9] Seizure          ED Disposition Condition    Discharge Stable          ED Prescriptions       Medication Sig Dispense Start Date End Date Auth. Provider    levETIRAcetam (KEPPRA) 1000 MG tablet Take 1 tablet (1,000 mg total) by mouth 2 (two) times daily. 60 tablet 5/25/2025 6/24/2025 Karthikeyan Ceja III, MD          Follow-up Information       Follow up With Specialties Details Why Contact Info    Ketan Green MD Internal Medicine In 3 days  [address] [facility] [facility] [id number] [phone]                     [1]   Social History  Tobacco Use    Smoking status: Every Day     Current packs/day: 0.50     Types: Cigarettes    Smokeless tobacco: Never   Substance Use Topics    Alcohol use: Not Currently     Comment: daily (pint of vodka per day)    Drug use: Yes     Types: Marijuana     Comment: daily        Karthikeyan Ceja III, MD  05/25/25 3574

## 2025-05-26 LAB
OHS QRS DURATION: 168 MS
OHS QTC CALCULATION: 489 MS

## 2025-06-11 ENCOUNTER — HOSPITAL ENCOUNTER (EMERGENCY)
Facility: HOSPITAL | Age: 65
Discharge: HOME | End: 2025-06-11
Attending: EMERGENCY MEDICINE
Payer: MEDICAID

## 2025-06-11 ENCOUNTER — HOSPITAL ENCOUNTER (EMERGENCY)
Facility: HOSPITAL | Age: 65
Discharge: LEFT BEFORE DISCHARGE | End: 2025-06-11
Payer: MEDICAID

## 2025-06-11 VITALS
BODY MASS INDEX: 16.25 KG/M2 | HEART RATE: 96 BPM | WEIGHT: 120 LBS | TEMPERATURE: 98 F | DIASTOLIC BLOOD PRESSURE: 71 MMHG | OXYGEN SATURATION: 88 % | HEIGHT: 72 IN | SYSTOLIC BLOOD PRESSURE: 132 MMHG | RESPIRATION RATE: 18 BRPM

## 2025-06-11 VITALS
OXYGEN SATURATION: 92 % | RESPIRATION RATE: 18 BRPM | HEART RATE: 85 BPM | SYSTOLIC BLOOD PRESSURE: 90 MMHG | DIASTOLIC BLOOD PRESSURE: 71 MMHG

## 2025-06-11 DIAGNOSIS — R56.9 SEIZURES: Primary | ICD-10-CM

## 2025-06-11 DIAGNOSIS — Z91.199 MEDICALLY NONCOMPLIANT: ICD-10-CM

## 2025-06-11 LAB
ALBUMIN SERPL-MCNC: 3.8 G/DL (ref 3.4–4.8)
ALBUMIN/GLOB SERPL: 1.2 RATIO (ref 1.1–2)
ALP SERPL-CCNC: 109 UNIT/L (ref 40–150)
ALT SERPL-CCNC: 30 UNIT/L (ref 0–55)
ANION GAP SERPL CALC-SCNC: 20 MEQ/L
AST SERPL-CCNC: 43 UNIT/L (ref 11–45)
BASOPHILS # BLD AUTO: 0.05 X10(3)/MCL
BASOPHILS NFR BLD AUTO: 0.4 %
BILIRUB SERPL-MCNC: 2.8 MG/DL
BUN SERPL-MCNC: 11.3 MG/DL (ref 8.4–25.7)
CALCIUM SERPL-MCNC: 8.5 MG/DL (ref 8.8–10)
CHLORIDE SERPL-SCNC: 95 MMOL/L (ref 98–107)
CO2 SERPL-SCNC: 24 MMOL/L (ref 23–31)
CREAT SERPL-MCNC: 0.87 MG/DL (ref 0.72–1.25)
CREAT/UREA NIT SERPL: 13
EOSINOPHIL # BLD AUTO: 0.01 X10(3)/MCL (ref 0–0.9)
EOSINOPHIL NFR BLD AUTO: 0.1 %
ERYTHROCYTE [DISTWIDTH] IN BLOOD BY AUTOMATED COUNT: 15.6 % (ref 11.5–17)
ETHANOL SERPL-MCNC: 12 MG/DL
GFR SERPLBLD CREATININE-BSD FMLA CKD-EPI: >60 ML/MIN/1.73/M2
GLOBULIN SER-MCNC: 3.2 GM/DL (ref 2.4–3.5)
GLUCOSE SERPL-MCNC: 287 MG/DL (ref 82–115)
HCT VFR BLD AUTO: 45.5 % (ref 42–52)
HGB BLD-MCNC: 14.1 G/DL (ref 14–18)
IMM GRANULOCYTES # BLD AUTO: 0.05 X10(3)/MCL (ref 0–0.04)
IMM GRANULOCYTES NFR BLD AUTO: 0.4 %
LYMPHOCYTES # BLD AUTO: 2.51 X10(3)/MCL (ref 0.6–4.6)
LYMPHOCYTES NFR BLD AUTO: 20.2 %
MCH RBC QN AUTO: 31.2 PG (ref 27–31)
MCHC RBC AUTO-ENTMCNC: 31 G/DL (ref 33–36)
MCV RBC AUTO: 100.7 FL (ref 80–94)
MONOCYTES # BLD AUTO: 1.27 X10(3)/MCL (ref 0.1–1.3)
MONOCYTES NFR BLD AUTO: 10.2 %
NEUTROPHILS # BLD AUTO: 8.56 X10(3)/MCL (ref 2.1–9.2)
NEUTROPHILS NFR BLD AUTO: 68.7 %
NRBC BLD AUTO-RTO: 0 %
PLATELET # BLD AUTO: 195 X10(3)/MCL (ref 130–400)
PMV BLD AUTO: 9.5 FL (ref 7.4–10.4)
POTASSIUM SERPL-SCNC: 3.6 MMOL/L (ref 3.5–5.1)
PROT SERPL-MCNC: 7 GM/DL (ref 5.8–7.6)
RBC # BLD AUTO: 4.52 X10(6)/MCL (ref 4.7–6.1)
SODIUM SERPL-SCNC: 139 MMOL/L (ref 136–145)
WBC # BLD AUTO: 12.45 X10(3)/MCL (ref 4.5–11.5)

## 2025-06-11 PROCEDURE — 85025 COMPLETE CBC W/AUTO DIFF WBC: CPT | Performed by: EMERGENCY MEDICINE

## 2025-06-11 PROCEDURE — 82077 ASSAY SPEC XCP UR&BREATH IA: CPT | Performed by: EMERGENCY MEDICINE

## 2025-06-11 PROCEDURE — 99285 EMERGENCY DEPT VISIT HI MDM: CPT | Mod: 25,27

## 2025-06-11 PROCEDURE — 96365 THER/PROPH/DIAG IV INF INIT: CPT

## 2025-06-11 PROCEDURE — 99283 EMERGENCY DEPT VISIT LOW MDM: CPT | Mod: 25

## 2025-06-11 PROCEDURE — 63600175 PHARM REV CODE 636 W HCPCS: Performed by: EMERGENCY MEDICINE

## 2025-06-11 PROCEDURE — 80053 COMPREHEN METABOLIC PANEL: CPT | Performed by: EMERGENCY MEDICINE

## 2025-06-11 RX ORDER — LEVETIRACETAM 1000 MG/1
1000 TABLET ORAL 2 TIMES DAILY
Qty: 60 TABLET | Refills: 3 | Status: SHIPPED | OUTPATIENT
Start: 2025-06-11 | End: 2025-07-11

## 2025-06-11 RX ORDER — LEVETIRACETAM 10 MG/ML
1000 INJECTION INTRAVASCULAR
Status: COMPLETED | OUTPATIENT
Start: 2025-06-11 | End: 2025-06-11

## 2025-06-11 RX ADMIN — LEVETIRACETAM INJECTION 1000 MG: 10 INJECTION INTRAVENOUS at 06:06

## 2025-06-11 NOTE — ED PROVIDER NOTES
Encounter Date: 6/11/2025       History     Chief Complaint   Patient presents with    Seizures     Had a witnessed seizure outside of ER on bench. Was dc'd this morning. Bystanders stated pt did not hit head. Seizing in room 1. MD Dony at bedside.      65-year-old male found outside the hospital and had a witnessed seizure.  He was sitting on a bench and lowered himself to the ground and started seizing.  Upon ED arrival patient was postictal.  Of note, patient checked in the ER hours before requesting refill of his seizure medication but he eloped without being seen.        Review of patient's allergies indicates:   Allergen Reactions    Keflex [cephalexin]     Keppra [levetiracetam] Palpitations     Past Medical History:   Diagnosis Date    Asthma     COPD (chronic obstructive pulmonary disease)     Seizures      No past surgical history on file.  No family history on file.  Social History[1]  Review of Systems   Neurological:  Positive for seizures.       Physical Exam     Initial Vitals [06/11/25 1908]   BP Pulse Resp Temp SpO2   (!) 139/114 75 20 -- 96 %      MAP       --         Physical Exam    Nursing note and vitals reviewed.  Constitutional: He appears well-developed and well-nourished. He is not diaphoretic. No distress.   Post-ictal    HENT:   Head: Normocephalic and atraumatic.   Right Ear: External ear normal.   Left Ear: External ear normal.   Eyes: Conjunctivae are normal.   Neck: Neck supple.   Normal range of motion.  Cardiovascular:  Normal rate.           Pulmonary/Chest: No respiratory distress.   Abdominal: He exhibits no distension.   Musculoskeletal:         General: Normal range of motion.      Cervical back: Normal range of motion and neck supple.     Neurological: GCS score is 15. GCS eye subscore is 4. GCS verbal subscore is 5. GCS motor subscore is 6.   Post-ictal, no seizure like activity    Skin: Skin is warm and dry. No pallor.   Psychiatric: He has a normal mood and affect.          ED Course   Procedures  Labs Reviewed   COMPREHENSIVE METABOLIC PANEL - Abnormal       Result Value    Sodium 139      Potassium 3.6      Chloride 95 (*)     CO2 24      Glucose 287 (*)     Blood Urea Nitrogen 11.3      Creatinine 0.87      Calcium 8.5 (*)     Protein Total 7.0      Albumin 3.8      Globulin 3.2      Albumin/Globulin Ratio 1.2      Bilirubin Total 2.8 (*)           ALT 30      AST 43      eGFR >60      Anion Gap 20.0      BUN/Creatinine Ratio 13     ALCOHOL,MEDICAL (ETHANOL) - Abnormal    Ethanol Level 12.0 (*)    CBC WITH DIFFERENTIAL - Abnormal    WBC 12.45 (*)     RBC 4.52 (*)     Hgb 14.1      Hct 45.5      .7 (*)     MCH 31.2 (*)     MCHC 31.0 (*)     RDW 15.6      Platelet 195      MPV 9.5      Neut % 68.7      Lymph % 20.2      Mono % 10.2      Eos % 0.1      Basophil % 0.4      Imm Grans % 0.4      Neut # 8.56      Lymph # 2.51      Mono # 1.27      Eos # 0.01      Baso # 0.05      Imm Gran # 0.05 (*)     NRBC% 0.0     CBC W/ AUTO DIFFERENTIAL    Narrative:     The following orders were created for panel order CBC auto differential.  Procedure                               Abnormality         Status                     ---------                               -----------         ------                     CBC with Differential[[id number]]       Abnormal            Final result                 Please view results for these tests on the individual orders.   DRUG SCREEN, URINE (BEAKER)   URINALYSIS, REFLEX TO URINE CULTURE          Imaging Results              CT Head Without Contrast (Final result)  Result time 06/11/25 19:12:32      Final result by Murray Vaughan MD (06/11/25 19:12:32)                   Impression:      1.  No acute intracranial findings identified    2.  Old infarcts, chronic microangiopathic ischemia and atrophy.      Electronically signed by: Murray Vaughan  Date:    06/11/2025  Time:    19:12               Narrative:    EXAMINATION:  CT HEAD WITHOUT  CONTRAST    CLINICAL HISTORY:  seizure;    TECHNIQUE:  Sequential axial images were performed of the brain without contrast.    Dose product length of 1693 mGycm. Automated exposure control was utilized to minimize radiation dose.    COMPARISON:  June 4, 2023.    FINDINGS:  There is no intracranial mass effect, midline shift, hydrocephalus or hemorrhage. There is no sulcal effacement or low attenuation changes to suggest recent large vessel territory infarction.  There is large encephalomalacia in the territory of the right middle cerebral artery.  There is also left cerebellar old lacunar infarct which however is new since the previous exam.  Chronic appearing periventricular and subcortical white matter low attenuation changes are present and are consistent with chronic microangiopathic ischemia. The ventricular system and sulcal markings prominence is consistent with atrophy. There is no acute extra axial fluid collection.  There is no acute depressed skull fracture.    There are old fractures of the nasal bones.  Visualized paranasal sinuses are clear without mucosal thickening, polypoidal abnormality or air-fluid levels. Mastoid air cells aeration is optimal.                                       Medications   levETIRAcetam in NaCl (iso-os) IVPB 1,000 mg (0 mg Intravenous Stopped 6/11/25 1913)     Medical Decision Making  DDX includes but not limited to seizure, hypoglycemia, brain bleed, stroke, drug intoxication, alcohol withdrawal      Patient stopped seizing without meds  Per chart review, patient checked in to ED several hours ago for refill of Keppra  Given Keppra 1000 mg IV  Patient returned to baseline  CT head normal  Labs normal  Rx Keppra sent to hospital pharmacy  Patient discharged to waiting room as he is homeless, will enlist case management in AM     Problems Addressed:  Medically noncompliant: acute illness or injury that poses a threat to life or bodily functions  Seizures: acute illness or  injury that poses a threat to life or bodily functions    Amount and/or Complexity of Data Reviewed  Labs: ordered. Decision-making details documented in ED Course.  Radiology: ordered. Decision-making details documented in ED Course.    Risk  Prescription drug management.  Diagnosis or treatment significantly limited by social determinants of health.               ED Course as of 06/11/25 2118 Wed Jun 11, 2025 1916 Patient is now awake and alert.  Denies any complaints.  Discussed with him that we will wait for his labs to come back and observe a little longer before discharging with his refill of Keppra [KM]      ED Course User Index  [KM] Ally Napoles MD                           Clinical Impression:  Final diagnoses:  [R56.9] Seizures (Primary)  [Z91.199] Medically noncompliant          ED Disposition Condition    Discharge Stable          ED Prescriptions       Medication Sig Dispense Start Date End Date Auth. Provider    levETIRAcetam (KEPPRA) 1000 MG tablet Take 1 tablet (1,000 mg total) by mouth 2 (two) times daily. 60 tablet 6/11/2025 7/11/2025 Ally Napoles MD          Follow-up Information       Follow up With Specialties Details Why Contact Info    [facility] - Emergency Dept Emergency Medicine  As needed, If symptoms worsen [address] [facility] [address] [phone]                   [1]   Social History  Tobacco Use    Smoking status: Every Day     Current packs/day: 0.50     Types: Cigarettes    Smokeless tobacco: Never   Substance Use Topics    Alcohol use: Not Currently     Comment: daily (pint of vodka per day)    Drug use: Yes     Types: Marijuana     Comment: daily        Ally Napoles MD  06/11/25 2118

## 2025-06-11 NOTE — FIRST PROVIDER EVALUATION
Medical screening examination initiated.  I have conducted a focused provider triage encounter, findings are as follows:    Brief history of present illness:  65 year old male presents to ER stating that he is out of his seizure medication. Denies any complaints.     Vitals:    06/11/25 1207   BP: 132/71   Pulse: 96   Resp: 18   Temp: 98.2 °F (36.8 °C)   TempSrc: Oral   SpO2: (!) 88%  Comment: COPD baseline   Weight: 54.4 kg (120 lb)   Height: 6' (1.829 m)       Pertinent physical exam:  awake and alert, nad    Brief workup plan:  exam     Preliminary workup initiated; this workup will be continued and followed by the physician or advanced practice provider that is assigned to the patient when roomed.

## 2025-06-11 NOTE — ED NOTES
PNAx3. Attempted to contact pt by provided number, attempt unsuccessful & went straight to voicemail.

## 2025-06-12 ENCOUNTER — DOCUMENTATION ONLY (OUTPATIENT)
Dept: CASE MANAGEMENT | Facility: HOSPITAL | Age: 65
End: 2025-06-12
Payer: MEDICAID

## 2025-06-12 NOTE — PROGRESS NOTES
Faxed referral packet to [facility]. Pt accepted by Dr. Joan Wahl per [facility] with [facility]. [facility] sending transportation.

## 2025-06-12 NOTE — PROGRESS NOTES
Provided pt with meds (covered by insurance). Updated him on lack of shelter availability. Attempted to call brother but number disconnected. Pt agreeable to 28 day treatment program for alcohol abuse. Contacted Jhony with [facility]. She will work on placement and call me back.

## 2025-09-02 ENCOUNTER — TELEPHONE (OUTPATIENT)
Dept: RADIOLOGY | Facility: HOSPITAL | Age: 65
End: 2025-09-02
Payer: MEDICAID